# Patient Record
Sex: FEMALE | Race: BLACK OR AFRICAN AMERICAN | NOT HISPANIC OR LATINO | Employment: FULL TIME | URBAN - METROPOLITAN AREA
[De-identification: names, ages, dates, MRNs, and addresses within clinical notes are randomized per-mention and may not be internally consistent; named-entity substitution may affect disease eponyms.]

---

## 2023-03-21 DIAGNOSIS — E87.6 HYPOKALEMIA: ICD-10-CM

## 2023-03-21 RX ORDER — POTASSIUM CHLORIDE 1500 MG/1
20 TABLET, EXTENDED RELEASE ORAL DAILY
Qty: 90 TABLET | Refills: 0 | Status: SHIPPED | OUTPATIENT
Start: 2023-03-21 | End: 2023-06-07

## 2023-03-21 RX ORDER — POTASSIUM CHLORIDE 1500 MG/1
20 TABLET, EXTENDED RELEASE ORAL DAILY
COMMUNITY
End: 2023-03-21 | Stop reason: SDUPTHER

## 2023-04-24 ENCOUNTER — TELEPHONE (OUTPATIENT)
Dept: PRIMARY CARE | Facility: CLINIC | Age: 63
End: 2023-04-24
Payer: COMMERCIAL

## 2023-06-06 DIAGNOSIS — K21.9 GASTROESOPHAGEAL REFLUX DISEASE, UNSPECIFIED WHETHER ESOPHAGITIS PRESENT: Primary | ICD-10-CM

## 2023-06-06 DIAGNOSIS — E87.6 HYPOKALEMIA: ICD-10-CM

## 2023-06-06 RX ORDER — OMEPRAZOLE 40 MG/1
40 CAPSULE, DELAYED RELEASE ORAL
COMMUNITY
End: 2023-06-06 | Stop reason: SDUPTHER

## 2023-06-07 RX ORDER — POTASSIUM CHLORIDE 1500 MG/1
20 TABLET, EXTENDED RELEASE ORAL DAILY
Qty: 30 TABLET | Refills: 0 | Status: SHIPPED | OUTPATIENT
Start: 2023-06-07 | End: 2023-07-12 | Stop reason: SDUPTHER

## 2023-06-07 RX ORDER — OMEPRAZOLE 40 MG/1
40 CAPSULE, DELAYED RELEASE ORAL
Qty: 30 CAPSULE | Refills: 0 | Status: SHIPPED | OUTPATIENT
Start: 2023-06-07 | End: 2023-07-03 | Stop reason: SDUPTHER

## 2023-06-29 DIAGNOSIS — K21.9 GASTROESOPHAGEAL REFLUX DISEASE, UNSPECIFIED WHETHER ESOPHAGITIS PRESENT: ICD-10-CM

## 2023-07-03 DIAGNOSIS — K21.9 GASTROESOPHAGEAL REFLUX DISEASE, UNSPECIFIED WHETHER ESOPHAGITIS PRESENT: ICD-10-CM

## 2023-07-03 RX ORDER — OMEPRAZOLE 40 MG/1
40 CAPSULE, DELAYED RELEASE ORAL
Qty: 30 CAPSULE | Refills: 0 | Status: SHIPPED | OUTPATIENT
Start: 2023-07-03 | End: 2023-07-12 | Stop reason: SDUPTHER

## 2023-07-09 RX ORDER — OMEPRAZOLE 40 MG/1
40 CAPSULE, DELAYED RELEASE ORAL
Qty: 30 CAPSULE | Refills: 0 | OUTPATIENT
Start: 2023-07-09

## 2023-07-12 ENCOUNTER — OFFICE VISIT (OUTPATIENT)
Dept: PRIMARY CARE | Facility: CLINIC | Age: 63
End: 2023-07-12
Payer: COMMERCIAL

## 2023-07-12 VITALS
WEIGHT: 146.4 LBS | DIASTOLIC BLOOD PRESSURE: 74 MMHG | HEIGHT: 64 IN | OXYGEN SATURATION: 99 % | BODY MASS INDEX: 25 KG/M2 | SYSTOLIC BLOOD PRESSURE: 121 MMHG | HEART RATE: 89 BPM

## 2023-07-12 DIAGNOSIS — Z12.31 VISIT FOR SCREENING MAMMOGRAM: ICD-10-CM

## 2023-07-12 DIAGNOSIS — F33.1 MODERATE EPISODE OF RECURRENT MAJOR DEPRESSIVE DISORDER (MULTI): ICD-10-CM

## 2023-07-12 DIAGNOSIS — D57.40: ICD-10-CM

## 2023-07-12 DIAGNOSIS — K21.9 GASTROESOPHAGEAL REFLUX DISEASE, UNSPECIFIED WHETHER ESOPHAGITIS PRESENT: ICD-10-CM

## 2023-07-12 DIAGNOSIS — Z00.00 ROUTINE GENERAL MEDICAL EXAMINATION AT A HEALTH CARE FACILITY: Primary | ICD-10-CM

## 2023-07-12 DIAGNOSIS — I10 PRIMARY HYPERTENSION: ICD-10-CM

## 2023-07-12 DIAGNOSIS — E87.6 HYPOKALEMIA: ICD-10-CM

## 2023-07-12 PROCEDURE — 99214 OFFICE O/P EST MOD 30 MIN: CPT | Performed by: STUDENT IN AN ORGANIZED HEALTH CARE EDUCATION/TRAINING PROGRAM

## 2023-07-12 PROCEDURE — 4004F PT TOBACCO SCREEN RCVD TLK: CPT | Performed by: STUDENT IN AN ORGANIZED HEALTH CARE EDUCATION/TRAINING PROGRAM

## 2023-07-12 PROCEDURE — 99396 PREV VISIT EST AGE 40-64: CPT | Performed by: STUDENT IN AN ORGANIZED HEALTH CARE EDUCATION/TRAINING PROGRAM

## 2023-07-12 PROCEDURE — 3078F DIAST BP <80 MM HG: CPT | Performed by: STUDENT IN AN ORGANIZED HEALTH CARE EDUCATION/TRAINING PROGRAM

## 2023-07-12 PROCEDURE — 3074F SYST BP LT 130 MM HG: CPT | Performed by: STUDENT IN AN ORGANIZED HEALTH CARE EDUCATION/TRAINING PROGRAM

## 2023-07-12 RX ORDER — TRIAMTERENE AND HYDROCHLOROTHIAZIDE 75; 50 MG/1; MG/1
1 TABLET ORAL DAILY
Qty: 90 TABLET | Refills: 3 | Status: SHIPPED | OUTPATIENT
Start: 2023-07-12

## 2023-07-12 RX ORDER — OFLOXACIN 3 MG/ML
SOLUTION/ DROPS OPHTHALMIC 4 TIMES DAILY
COMMUNITY
Start: 2022-04-28 | End: 2024-06-04 | Stop reason: WASHOUT

## 2023-07-12 RX ORDER — DOXYCYCLINE 100 MG/1
100 CAPSULE ORAL
COMMUNITY
Start: 2023-05-29

## 2023-07-12 RX ORDER — TRIAMTERENE AND HYDROCHLOROTHIAZIDE 75; 50 MG/1; MG/1
1 TABLET ORAL DAILY
COMMUNITY
End: 2023-07-12 | Stop reason: SDUPTHER

## 2023-07-12 RX ORDER — OMEPRAZOLE 40 MG/1
40 CAPSULE, DELAYED RELEASE ORAL
Qty: 90 CAPSULE | Refills: 3 | Status: SHIPPED | OUTPATIENT
Start: 2023-07-12

## 2023-07-12 RX ORDER — CYCLOPENTOLATE HYDROCHLORIDE 10 MG/ML
SOLUTION/ DROPS OPHTHALMIC 2 TIMES DAILY
COMMUNITY
Start: 2022-04-28 | End: 2024-06-04 | Stop reason: WASHOUT

## 2023-07-12 RX ORDER — MULTIVITAMIN
1 TABLET ORAL DAILY
COMMUNITY
Start: 2012-08-21 | End: 2024-06-04 | Stop reason: WASHOUT

## 2023-07-12 RX ORDER — PLANT STANOL ESTER 450 MG
8000 TABLET ORAL DAILY
COMMUNITY

## 2023-07-12 RX ORDER — CETIRIZINE HYDROCHLORIDE 10 MG/1
10 TABLET ORAL DAILY
COMMUNITY
End: 2024-06-04 | Stop reason: WASHOUT

## 2023-07-12 RX ORDER — BUPROPION HYDROCHLORIDE 300 MG/1
300 TABLET ORAL DAILY
COMMUNITY
End: 2023-07-12 | Stop reason: SDUPTHER

## 2023-07-12 RX ORDER — SYRINGE-NEEDLE,INSULIN,0.5 ML 28GX1/2"
1 SYRINGE, EMPTY DISPOSABLE MISCELLANEOUS 2 TIMES DAILY
COMMUNITY
Start: 2022-08-19 | End: 2024-06-04 | Stop reason: WASHOUT

## 2023-07-12 RX ORDER — FOLIC ACID 1 MG/1
1 TABLET ORAL DAILY
COMMUNITY
Start: 2012-08-21

## 2023-07-12 RX ORDER — ASCORBIC ACID 125 MG
1 TABLET,CHEWABLE ORAL DAILY
COMMUNITY
Start: 2019-12-10 | End: 2024-06-04 | Stop reason: WASHOUT

## 2023-07-12 RX ORDER — POTASSIUM CHLORIDE 1500 MG/1
TABLET, EXTENDED RELEASE ORAL
Qty: 180 TABLET | Refills: 3 | Status: SHIPPED | OUTPATIENT
Start: 2023-07-12 | End: 2024-06-06 | Stop reason: SDUPTHER

## 2023-07-12 RX ORDER — OXYCODONE HYDROCHLORIDE 5 MG/1
5 TABLET ORAL
COMMUNITY
Start: 2023-06-09 | End: 2023-12-18 | Stop reason: SDUPTHER

## 2023-07-12 RX ORDER — BUPROPION HYDROCHLORIDE 300 MG/1
300 TABLET ORAL DAILY
Qty: 90 TABLET | Refills: 3 | Status: SHIPPED | OUTPATIENT
Start: 2023-07-12

## 2023-07-12 NOTE — PROGRESS NOTES
"  Subjective     Patient ID: Sonja Dallas is a 62 y.o. female who presents for Annual Exam (CPE.).    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     Dental visits : Y  Vision issues :Y  Hearing issues : N    Immunizations : Y    Diet :  could be better  Exercise:  Weight concerns :     Alcohol: as noted in SH  Tobacco: as noted in SH  Recreational drug use : None/ as noted in SH    Sexually active :  inactive   Contraception :   Menstrual problems:  Premenopausal/perimenopausal/ postmenopausal:    G:  Parity:  Full term:    Premature:   (s):   Living :  Ab induced:   Ab spontaneous :  Ectopic :   Multiple :    PAP smear :  Mammogram :  Colonoscopy:    Metabolic screening   - Lipid   - Glucose  ======================================================    Visit Vitals  /74   Pulse 89   Ht 1.626 m (5' 4\")   Wt 66.4 kg (146 lb 6.4 oz)   SpO2 99%   BMI 25.13 kg/m²   Smoking Status Some Days   BSA 1.73 m²      No LMP recorded.     =====================================    Review of systems:  Constitutional: no chills, no fever and no night sweats.     Eyes: no blurred vision and no eyesight problems.     ENT: no hearing loss, no nasal congestion, no nasal discharge, no hoarseness and no sore throat.     Cardiovascular: no chest pain, no intermittent leg claudication, no lower extremity edema, no palpitations and no syncope.     Respiratory: no cough, no shortness of breath during exertion, no shortness of breath at rest and no wheezing.     Gastrointestinal: no abdominal pain, no blood in stools, no constipation, no diarrhea, no melena, no nausea, no rectal pain " and no vomiting.     Genitourinary: no dysuria, no change in urinary frequency, no urinary hesitancy, no feelings of urinary urgency and no vaginal discharge.     Musculoskeletal: no arthralgias, no back pain and no myalgias.     Integumentary: no new skin lesions and no rashes.     Neurological: no difficulty walking, no headache, no limb weakness, no numbness and no tingling.     Psychiatric: no anxiety, no depression, no anhedonia and no substance use disorders.   ============================================================    Physical exam :    Constitutional: Alert and in no acute distress. Well developed, well nourished.     Eyes: Normal external exam. Pupils were equal in size, round, reactive to light (PERRL) with normal accommodation and extraocular movements intact (EOMI).     Ears, Nose, Mouth, and Throat: External inspection of ears and nose: Normal.  Otoscopic examination: Normal.      Neck: No neck mass was observed. Supple.     Cardiovascular: Heart rate and rhythm were normal, normal S1 and S2, no gallops, no murmurs and no pericardial rub    Pulmonary: No respiratory distress. Clear bilateral breath sounds.     Abdomen: Soft nontender; no abdominal mass palpated. No organomegaly.     Musculoskeletal: No joint swelling seen, normal movements of all extremities. Range of motion: Normal.  Muscle strength/tone: Normal.          Neurologic: Deep tendon reflexes were 2+ and symmetric. Sensation: Normal.     Psychiatric: Judgment and insight: Intact. Mood and affect: Normal.        Assessment/Plan    Diagnoses and all orders for this visit:  Routine general medical examination at a health care facility  Primary hypertension  -     Lipid Panel; Future  -     Hemoglobin A1C; Future  -     TSH with reflex to Free T4 if abnormal; Future  -     triamterene-hydrochlorothiazid (Maxzide) 75-50 mg tablet; Take 1 tablet by mouth once daily.  Gastroesophageal reflux disease, unspecified whether esophagitis present  -      omeprazole (PriLOSEC) 40 mg DR capsule; Take 1 capsule (40 mg) by mouth once daily in the morning. Take before meals.  Hypokalemia  -     Basic Metabolic Panel; Future  -     potassium chloride CR (K-Tab) 20 mEq ER tablet; Take 2 tablets every day  Sickle cell-thalassemia disease (CMS/HCC)  -     CBC; Future  Visit for screening mammogram  -     BI mammo bilateral screening tomosynthesis; Future  Moderate episode of recurrent major depressive disorder (CMS/HCC)  -     buPROPion XL (Wellbutrin XL) 300 mg 24 hr tablet; Take 1 tablet (300 mg) by mouth once daily.         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 ), h/o non small cell lung cancer s/p left upper lobe lobectomy , former smoker , HTN, depression ( Stable on Bupropion , worse during the process of divorce ), insomnia ( uses medical marijuana )        # HTN : controlled      # Mild HPL : Rpt Lipid panel      # hypokalemia : continue the use of Potassium supplements      HM :   Tdap :UTD  Flu : N/A  Shingles :  UTD   Mammogram :  ordered  Colonoscopy: current , due Sept 2026  PAP : current 3/2022 , due 2025, est with GYN     General preventive care discussed which includes regular exercise, healthy eating habits, staying active weight that is appropriate for his height, avoidance of smoking, excess alcohol consumption, avoidance of recreational drugs, safe and responsible sexual relationships and practices.       Vit D supplements recommended   Regular exercise recommended   Healthy eating choices discussed and recommended   BMI ( Body mass index ) discussed and encouraged weight loss through the above discussed lifestyle modifications .     Appropriate labs ordered            RTO in a year or sooner if needed

## 2023-07-13 ENCOUNTER — LAB (OUTPATIENT)
Dept: LAB | Facility: LAB | Age: 63
End: 2023-07-13
Payer: COMMERCIAL

## 2023-07-13 DIAGNOSIS — E87.6 HYPOKALEMIA: ICD-10-CM

## 2023-07-13 DIAGNOSIS — I10 PRIMARY HYPERTENSION: ICD-10-CM

## 2023-07-13 DIAGNOSIS — D57.40: ICD-10-CM

## 2023-07-13 LAB
ANION GAP IN SER/PLAS: 12 MMOL/L (ref 10–20)
CALCIUM (MG/DL) IN SER/PLAS: 10 MG/DL (ref 8.6–10.6)
CARBON DIOXIDE, TOTAL (MMOL/L) IN SER/PLAS: 29 MMOL/L (ref 21–32)
CHLORIDE (MMOL/L) IN SER/PLAS: 102 MMOL/L (ref 98–107)
CHOLESTEROL (MG/DL) IN SER/PLAS: 175 MG/DL (ref 0–199)
CHOLESTEROL IN HDL (MG/DL) IN SER/PLAS: 56.8 MG/DL
CHOLESTEROL/HDL RATIO: 3.1
CREATININE (MG/DL) IN SER/PLAS: 1.18 MG/DL (ref 0.5–1.05)
ERYTHROCYTE DISTRIBUTION WIDTH (RATIO) BY AUTOMATED COUNT: 15 % (ref 11.5–14.5)
ERYTHROCYTE MEAN CORPUSCULAR HEMOGLOBIN CONCENTRATION (G/DL) BY AUTOMATED: 33.4 G/DL (ref 32–36)
ERYTHROCYTE MEAN CORPUSCULAR VOLUME (FL) BY AUTOMATED COUNT: 81 FL (ref 80–100)
ERYTHROCYTES (10*6/UL) IN BLOOD BY AUTOMATED COUNT: 3.89 X10E12/L (ref 4–5.2)
GFR FEMALE: 53 ML/MIN/1.73M2
GLUCOSE (MG/DL) IN SER/PLAS: 92 MG/DL (ref 74–99)
HEMATOCRIT (%) IN BLOOD BY AUTOMATED COUNT: 31.7 % (ref 36–46)
HEMOGLOBIN (G/DL) IN BLOOD: 10.6 G/DL (ref 12–16)
LDL: 97 MG/DL (ref 0–99)
LEUKOCYTES (10*3/UL) IN BLOOD BY AUTOMATED COUNT: 4.9 X10E9/L (ref 4.4–11.3)
NRBC (PER 100 WBCS) BY AUTOMATED COUNT: 0 /100 WBC (ref 0–0)
PLATELETS (10*3/UL) IN BLOOD AUTOMATED COUNT: 229 X10E9/L (ref 150–450)
POTASSIUM (MMOL/L) IN SER/PLAS: 3.6 MMOL/L (ref 3.5–5.3)
SODIUM (MMOL/L) IN SER/PLAS: 139 MMOL/L (ref 136–145)
THYROTROPIN (MIU/L) IN SER/PLAS BY DETECTION LIMIT <= 0.05 MIU/L: 0.56 MIU/L (ref 0.44–3.98)
TRIGLYCERIDE (MG/DL) IN SER/PLAS: 108 MG/DL (ref 0–149)
UREA NITROGEN (MG/DL) IN SER/PLAS: 10 MG/DL (ref 6–23)
VLDL: 22 MG/DL (ref 0–40)

## 2023-07-13 PROCEDURE — 36415 COLL VENOUS BLD VENIPUNCTURE: CPT

## 2023-07-13 PROCEDURE — 84443 ASSAY THYROID STIM HORMONE: CPT

## 2023-07-13 PROCEDURE — 80061 LIPID PANEL: CPT

## 2023-07-13 PROCEDURE — 85027 COMPLETE CBC AUTOMATED: CPT

## 2023-07-13 PROCEDURE — 80048 BASIC METABOLIC PNL TOTAL CA: CPT

## 2023-07-13 PROCEDURE — 83036 HEMOGLOBIN GLYCOSYLATED A1C: CPT

## 2023-07-18 ENCOUNTER — TELEPHONE (OUTPATIENT)
Dept: PRIMARY CARE | Facility: CLINIC | Age: 63
End: 2023-07-18
Payer: COMMERCIAL

## 2023-07-18 LAB
ESTIMATED AVERAGE GLUCOSE FOR HBA1C: 82 MG/DL
HEMOGLOBIN A1C/HEMOGLOBIN TOTAL IN BLOOD: 4.5 %

## 2023-07-18 NOTE — TELEPHONE ENCOUNTER
----- Message from Bharti Romero MD sent at 7/18/2023  3:58 PM EDT -----  Normal screening mammogram

## 2023-07-20 ENCOUNTER — TELEPHONE (OUTPATIENT)
Dept: PRIMARY CARE | Facility: CLINIC | Age: 63
End: 2023-07-20
Payer: COMMERCIAL

## 2023-07-20 DIAGNOSIS — I10 PRIMARY HYPERTENSION: Primary | ICD-10-CM

## 2023-07-20 NOTE — TELEPHONE ENCOUNTER
----- Message from Bharti Romero MD sent at 7/20/2023  1:07 PM EDT -----  Mildly decreased kidney function , advise her to increase water intake   Advise to be seen in 6m  for fu on this . Rpt labs to be done prior to the visit   Anemia is at baseline    Normal cholesterol levels    Normal thyroid function test

## 2023-07-20 NOTE — TELEPHONE ENCOUNTER
----- Message from Bharti Romero MD sent at 7/18/2023  3:58 PM EDT -----  Normal screening mammogram   
Patient notified.   
Oriented - self; Oriented - place; Oriented - time

## 2023-08-09 ENCOUNTER — OFFICE VISIT (OUTPATIENT)
Dept: PRIMARY CARE | Facility: CLINIC | Age: 63
End: 2023-08-09
Payer: COMMERCIAL

## 2023-08-09 ENCOUNTER — LAB (OUTPATIENT)
Dept: LAB | Facility: LAB | Age: 63
End: 2023-08-09
Payer: COMMERCIAL

## 2023-08-09 VITALS
BODY MASS INDEX: 25.37 KG/M2 | DIASTOLIC BLOOD PRESSURE: 84 MMHG | HEART RATE: 107 BPM | WEIGHT: 148.6 LBS | OXYGEN SATURATION: 99 % | HEIGHT: 64 IN | SYSTOLIC BLOOD PRESSURE: 134 MMHG

## 2023-08-09 DIAGNOSIS — I10 PRIMARY HYPERTENSION: ICD-10-CM

## 2023-08-09 DIAGNOSIS — Z11.3 ROUTINE SCREENING FOR STI (SEXUALLY TRANSMITTED INFECTION): ICD-10-CM

## 2023-08-09 DIAGNOSIS — Z11.3 ROUTINE SCREENING FOR STI (SEXUALLY TRANSMITTED INFECTION): Primary | ICD-10-CM

## 2023-08-09 LAB
ANION GAP IN SER/PLAS: 13 MMOL/L (ref 10–20)
CALCIUM (MG/DL) IN SER/PLAS: 10.2 MG/DL (ref 8.6–10.6)
CARBON DIOXIDE, TOTAL (MMOL/L) IN SER/PLAS: 28 MMOL/L (ref 21–32)
CHLORIDE (MMOL/L) IN SER/PLAS: 106 MMOL/L (ref 98–107)
CREATININE (MG/DL) IN SER/PLAS: 1.21 MG/DL (ref 0.5–1.05)
GFR FEMALE: 51 ML/MIN/1.73M2
GLUCOSE (MG/DL) IN SER/PLAS: 105 MG/DL (ref 74–99)
HEPATITIS B VIRUS SURFACE AG PRESENCE IN SERUM: NONREACTIVE
HEPATITIS C VIRUS AB PRESENCE IN SERUM: NONREACTIVE
HIV 1/ 2 AG/AB SCREEN: NONREACTIVE
POTASSIUM (MMOL/L) IN SER/PLAS: 3.4 MMOL/L (ref 3.5–5.3)
SODIUM (MMOL/L) IN SER/PLAS: 144 MMOL/L (ref 136–145)
UREA NITROGEN (MG/DL) IN SER/PLAS: 11 MG/DL (ref 6–23)

## 2023-08-09 PROCEDURE — 36415 COLL VENOUS BLD VENIPUNCTURE: CPT

## 2023-08-09 PROCEDURE — 86694 HERPES SIMPLEX NES ANTBDY: CPT

## 2023-08-09 PROCEDURE — 86696 HERPES SIMPLEX TYPE 2 TEST: CPT

## 2023-08-09 PROCEDURE — 86803 HEPATITIS C AB TEST: CPT

## 2023-08-09 PROCEDURE — 80048 BASIC METABOLIC PNL TOTAL CA: CPT

## 2023-08-09 PROCEDURE — 87661 TRICHOMONAS VAGINALIS AMPLIF: CPT

## 2023-08-09 PROCEDURE — 86780 TREPONEMA PALLIDUM: CPT

## 2023-08-09 PROCEDURE — 87491 CHLMYD TRACH DNA AMP PROBE: CPT

## 2023-08-09 PROCEDURE — 87389 HIV-1 AG W/HIV-1&-2 AB AG IA: CPT

## 2023-08-09 PROCEDURE — 4004F PT TOBACCO SCREEN RCVD TLK: CPT | Performed by: STUDENT IN AN ORGANIZED HEALTH CARE EDUCATION/TRAINING PROGRAM

## 2023-08-09 PROCEDURE — 99214 OFFICE O/P EST MOD 30 MIN: CPT | Performed by: STUDENT IN AN ORGANIZED HEALTH CARE EDUCATION/TRAINING PROGRAM

## 2023-08-09 PROCEDURE — 86695 HERPES SIMPLEX TYPE 1 TEST: CPT

## 2023-08-09 PROCEDURE — 87591 N.GONORRHOEAE DNA AMP PROB: CPT

## 2023-08-09 PROCEDURE — 87340 HEPATITIS B SURFACE AG IA: CPT

## 2023-08-09 NOTE — PROGRESS NOTES
"Subjective   Patient ID: Sonja Dallas is a 63 y.o. female who presents for Follow-up (STD check. ).        HPI    Concern for STI   Last sexual activity with her former partner  a month ago   She denies any symptoms - vaginal discharge   H/o HSV In the past , no recent outbreaks     Visit Vitals  /84   Pulse 107   Ht 1.626 m (5' 4\")   Wt 67.4 kg (148 lb 9.6 oz)   SpO2 99%   BMI 25.51 kg/m²   Smoking Status Some Days   BSA 1.74 m²      No LMP recorded.     Review of Systems    Constitutional : No feeling poorly / fevers/ chills / night sweats/ fatigue   : As noted in HPI   skin : As noted in HPI   Psychiatric: No anxiety/ depression/ SI/HI    All other systems have been reviewed and are negative for complaint       Physical Exam    Constitutional : Vitals reviewed. Alert and in no distress  Cardiovascular : RRR, Normal S1, S2, No pericardial rub/ gallop, no peripheral edema   Pulmonary: No respiratory distress, CTAB     Neurologic : CNs 2-12 grossly intact , no obvious FNDs  Psych : A,Ox3, normal mood and affect      Assessment/Plan   Diagnoses and all orders for this visit:  Routine screening for STI (sexually transmitted infection)  -     HIV 1/2 Antigen/Antibody Screen with Reflex to Confirmation; Future  -     Syphilis Screen with Reflex; Future  -     HSV1 IgG and HSV2 IgG; Future  -     HSV Type I/II IgM Antibody; Future  -     Hepatitis B Surface Antigen; Future  -     Hepatitis C Antibody; Future  -     C. Trachomatis / N. Gonorrhoeae, Amplified Detection; Future  -     TRICH VAGINALIS, AMPLIFIED; Future      STI screen  Positive serology  for HSV discussed .   Further mgmt pending the results      "

## 2023-08-10 LAB
CHLAMYDIA TRACH., AMPLIFIED: NEGATIVE
HERPES SIMPLEX VIRUS 1 IGG: 1.9 INDEX
HERPES SIMPLEX VIRUS 2 IGG: >8 INDEX
N. GONORRHEA, AMPLIFIED: NEGATIVE
SYPHILIS TOTAL AB: NONREACTIVE
TRICHOMONAS VAGINALIS: NEGATIVE

## 2023-08-12 LAB — HERPES SIMPLEX VIRUS I/II ANTIBODY, IGM: 1.1 IV

## 2023-08-15 ENCOUNTER — TELEPHONE (OUTPATIENT)
Dept: PRIMARY CARE | Facility: CLINIC | Age: 63
End: 2023-08-15
Payer: COMMERCIAL

## 2023-08-17 ENCOUNTER — LAB (OUTPATIENT)
Dept: LAB | Facility: LAB | Age: 63
End: 2023-08-17
Payer: COMMERCIAL

## 2023-08-17 DIAGNOSIS — Z02.1 PHYSICAL EXAM, PRE-EMPLOYMENT: Primary | ICD-10-CM

## 2023-08-17 DIAGNOSIS — Z02.1 PHYSICAL EXAM, PRE-EMPLOYMENT: ICD-10-CM

## 2023-08-17 LAB
APPEARANCE, URINE: CLEAR
BILIRUBIN, URINE: NEGATIVE
BLOOD, URINE: NEGATIVE
COLOR, URINE: YELLOW
GLUCOSE, URINE: NEGATIVE MG/DL
KETONES, URINE: NEGATIVE MG/DL
LEUKOCYTE ESTERASE, URINE: NEGATIVE
NITRITE, URINE: NEGATIVE
PH, URINE: 7 (ref 5–8)
PROTEIN, URINE: NEGATIVE MG/DL
SPECIFIC GRAVITY, URINE: 1.01 (ref 1–1.03)
UROBILINOGEN, URINE: 4 MG/DL (ref 0–1.9)

## 2023-08-17 PROCEDURE — 36415 COLL VENOUS BLD VENIPUNCTURE: CPT

## 2023-08-17 PROCEDURE — 86481 TB AG RESPONSE T-CELL SUSP: CPT

## 2023-08-17 PROCEDURE — 81003 URINALYSIS AUTO W/O SCOPE: CPT

## 2023-08-20 LAB
NIL(NEG) CONTROL SPOT COUNT: NORMAL
PANEL A SPOT COUNT: 0
PANEL B SPOT COUNT: 0
POS CONTROL SPOT COUNT: NORMAL
T-SPOT. TB INTERPRETATION: NEGATIVE

## 2023-08-25 ENCOUNTER — TELEMEDICINE (OUTPATIENT)
Dept: PRIMARY CARE | Facility: CLINIC | Age: 63
End: 2023-08-25
Payer: COMMERCIAL

## 2023-08-25 ENCOUNTER — APPOINTMENT (OUTPATIENT)
Dept: PRIMARY CARE | Facility: CLINIC | Age: 63
End: 2023-08-25
Payer: COMMERCIAL

## 2023-08-25 DIAGNOSIS — J06.9 UPPER RESPIRATORY TRACT INFECTION, UNSPECIFIED TYPE: Primary | ICD-10-CM

## 2023-08-25 PROCEDURE — 87636 SARSCOV2 & INF A&B AMP PRB: CPT

## 2023-08-25 PROCEDURE — 99214 OFFICE O/P EST MOD 30 MIN: CPT | Performed by: STUDENT IN AN ORGANIZED HEALTH CARE EDUCATION/TRAINING PROGRAM

## 2023-08-25 NOTE — PROGRESS NOTES
Subjective   Patient ID: Sonja Dallas is a 63 y.o. female who presents for Follow-up (Sickness possible COVID ).        HPI    Televisit   Sx onset yesterday   HA and body aches   Loss of appetite   Nasal congestion   Was with her son , who was exposed  to covid at work     Visit Vitals  Smoking Status Some Days      No LMP recorded.     Review of Systems    As noted in HPI       Physical Exam    Limited physical due to the virtual nature of this visit ( real time audio- visual )  Constitutional : Alert, in no distress     Pulm : Normal work of breathing   CNS : Moves all extremities symmetrically   Psych:  A , O X 3 normal mood and effect, speaks coherently     Assessment/Plan   Diagnoses and all orders for this visit:  Upper respiratory tract infection, unspecified type  -     Sars-CoV-2 PCR, Symptomatic; Future  -     Influenza A, and B PCR; Future         Testing as above   Suggested ER visit given her sickle cell anemia nd crises with infections, pt declined   Tesitng to be done in the office , further mgmt pending the results

## 2023-08-26 LAB
FLU A RESULT: NOT DETECTED
FLU B RESULT: NOT DETECTED
SARS-COV-2 RESULT: NOT DETECTED

## 2023-11-13 ENCOUNTER — TELEMEDICINE (OUTPATIENT)
Dept: PRIMARY CARE | Facility: CLINIC | Age: 63
End: 2023-11-13
Payer: COMMERCIAL

## 2023-11-13 ENCOUNTER — CLINICAL SUPPORT (OUTPATIENT)
Dept: PRIMARY CARE | Facility: CLINIC | Age: 63
End: 2023-11-13
Payer: COMMERCIAL

## 2023-11-13 DIAGNOSIS — R68.89 FLU-LIKE SYMPTOMS: Primary | ICD-10-CM

## 2023-11-13 PROCEDURE — 87636 SARSCOV2 & INF A&B AMP PRB: CPT

## 2023-11-13 PROCEDURE — 99214 OFFICE O/P EST MOD 30 MIN: CPT | Performed by: STUDENT IN AN ORGANIZED HEALTH CARE EDUCATION/TRAINING PROGRAM

## 2023-11-13 NOTE — LETTER
November 14, 2023       No Recipients    Patient: Sonja Dallas   YOB: 1960   Date of Visit: 11/13/2023       Dear Dr. Jimenez Recipients:    Thank you for referring Sonja Dallas to me for evaluation. Below are my notes for this consultation.  If you have questions, please do not hesitate to call me. I look forward to following your patient along with you.       Sincerely,     Bharti Romero MD      CC:   No Recipients  ______________________________________________________________________________________    Subjective  Patient ID: Sonja Dallas is a 63 y.o. female who presents for Follow-up (Flu-like sxs. ).        HPI    Televisit   Sx onset on Friday  - chills , nasal discharge  Excessive lacrimation  HA+  SOB +  Cough +  Works with children     Visit Vitals  Smoking Status Some Days      No LMP recorded.     Review of Systems    As noted in HPI       Physical Exam    Limited physical due to the virtual nature of this visit ( real time audio- visual )  Constitutional : Alert, in no distress     Pulm : Normal work of breathing   CNS : Moves all extremities symmetrically   Psych:  A , O X 3 normal mood and effect, speaks coherently     Assessment/Plan  Diagnoses and all orders for this visit:  Flu-like symptoms  -     Sars-CoV-2 and Influenza A/B PCR; Future       Flu vs covid vs bacterial infection ( CAP ) vs acute bronchitis   Testing ordered as above   Pt has sickle cell anemia , discussed risk of sickle cell crises and sx to watch for and the  need to go to the ER for mgmt     Conditions addressed and mgmt as noted above.  Pertinent labs, images/ imaging reports , chart review was done .   Age appropriate labs / labs for mgmt of chronic medical conditions ordered, further mgmt pending the results.

## 2023-11-13 NOTE — PROGRESS NOTES
Subjective   Patient ID: Sonja Dallas is a 63 y.o. female who presents for Follow-up (Flu-like sxs. ).        HPI    Televisit   Sx onset on Friday  - chills , nasal discharge  Excessive lacrimation  HA+  SOB +  Cough +  Works with children     Visit Vitals  Smoking Status Some Days      No LMP recorded.     Review of Systems    As noted in HPI       Physical Exam    Limited physical due to the virtual nature of this visit ( real time audio- visual )  Constitutional : Alert, in no distress     Pulm : Normal work of breathing   CNS : Moves all extremities symmetrically   Psych:  A , O X 3 normal mood and effect, speaks coherently     Assessment/Plan   Diagnoses and all orders for this visit:  Flu-like symptoms  -     Sars-CoV-2 and Influenza A/B PCR; Future       Flu vs covid vs bacterial infection ( CAP ) vs acute bronchitis   Testing ordered as above   Pt has sickle cell anemia , discussed risk of sickle cell crises and sx to watch for and the  need to go to the ER for mgmt     Conditions addressed and mgmt as noted above.  Pertinent labs, images/ imaging reports , chart review was done .   Age appropriate labs / labs for mgmt of chronic medical conditions ordered, further mgmt pending the results.

## 2023-11-14 DIAGNOSIS — J06.9 UPPER RESPIRATORY TRACT INFECTION, UNSPECIFIED TYPE: Primary | ICD-10-CM

## 2023-11-14 LAB
FLUAV RNA RESP QL NAA+PROBE: NOT DETECTED
FLUBV RNA RESP QL NAA+PROBE: NOT DETECTED
SARS-COV-2 RNA RESP QL NAA+PROBE: NOT DETECTED

## 2023-11-14 RX ORDER — AZITHROMYCIN 500 MG/1
500 TABLET, FILM COATED ORAL DAILY
Qty: 5 TABLET | Refills: 0 | Status: SHIPPED | OUTPATIENT
Start: 2023-11-14 | End: 2023-11-19

## 2023-11-14 NOTE — PROGRESS NOTES
Spoke to pt at 4: 15 pm   COVID and flu negative   Today is day 5 of sx with no improvement   Most bothersome sx : HA, chills and cough Rx with azithromycin   If sx worsening, advised to go to the ER

## 2023-11-29 ENCOUNTER — OFFICE VISIT (OUTPATIENT)
Dept: PRIMARY CARE | Facility: CLINIC | Age: 63
End: 2023-11-29
Payer: COMMERCIAL

## 2023-11-29 VITALS
SYSTOLIC BLOOD PRESSURE: 130 MMHG | WEIGHT: 155.4 LBS | HEIGHT: 64 IN | OXYGEN SATURATION: 94 % | HEART RATE: 100 BPM | DIASTOLIC BLOOD PRESSURE: 76 MMHG | BODY MASS INDEX: 26.53 KG/M2

## 2023-11-29 DIAGNOSIS — Z23 NEED FOR INFLUENZA VACCINATION: ICD-10-CM

## 2023-11-29 DIAGNOSIS — Z85.118: ICD-10-CM
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
## 2023-11-29 PROCEDURE — 90471 IMMUNIZATION ADMIN: CPT | Performed by: STUDENT IN AN ORGANIZED HEALTH CARE EDUCATION/TRAINING PROGRAM

## 2023-11-29 PROCEDURE — 4004F PT TOBACCO SCREEN RCVD TLK: CPT | Performed by: STUDENT IN AN ORGANIZED HEALTH CARE EDUCATION/TRAINING PROGRAM

## 2023-11-29 PROCEDURE — 90686 IIV4 VACC NO PRSV 0.5 ML IM: CPT | Performed by: STUDENT IN AN ORGANIZED HEALTH CARE EDUCATION/TRAINING PROGRAM

## 2023-11-29 PROCEDURE — 99214 OFFICE O/P EST MOD 30 MIN: CPT | Performed by: STUDENT IN AN ORGANIZED HEALTH CARE EDUCATION/TRAINING PROGRAM

## 2023-11-29 RX ORDER — CYCLOBENZAPRINE HCL 5 MG
5 TABLET ORAL 3 TIMES DAILY PRN
Qty: 30 TABLET | Refills: 0 | Status: SHIPPED | OUTPATIENT
Start: 2023-11-29 | End: 2024-06-04 | Stop reason: WASHOUT
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
## 2023-11-30 ENCOUNTER — ANCILLARY PROCEDURE (OUTPATIENT)
Dept: RADIOLOGY | Facility: CLINIC | Age: 63
End: 2023-11-30
Payer: COMMERCIAL
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
## 2023-11-30 PROCEDURE — 72100 X-RAY EXAM L-S SPINE 2/3 VWS: CPT | Performed by: RADIOLOGY

## 2023-11-30 PROCEDURE — 72040 X-RAY EXAM NECK SPINE 2-3 VW: CPT

## 2023-11-30 PROCEDURE — 72100 X-RAY EXAM L-S SPINE 2/3 VWS: CPT

## 2023-11-30 PROCEDURE — 72040 X-RAY EXAM NECK SPINE 2-3 VW: CPT | Performed by: RADIOLOGY

## 2023-12-18 ENCOUNTER — TELEMEDICINE (OUTPATIENT)
Dept: HEMATOLOGY/ONCOLOGY | Facility: HOSPITAL | Age: 63
End: 2023-12-18
Payer: COMMERCIAL

## 2023-12-18 DIAGNOSIS — D57.44 SICKLE CELL DISEASE, TYPE S BETA-PLUS THALASSEMIA (MULTI): Primary | ICD-10-CM

## 2023-12-18 DIAGNOSIS — I15.9 SECONDARY HYPERTENSION: ICD-10-CM

## 2023-12-18 PROCEDURE — 99214 OFFICE O/P EST MOD 30 MIN: CPT | Performed by: PEDIATRICS

## 2023-12-18 RX ORDER — OXYCODONE HYDROCHLORIDE 5 MG/1
5 TABLET ORAL EVERY 6 HOURS PRN
Qty: 60 TABLET | Refills: 0 | Status: SHIPPED | OUTPATIENT
Start: 2023-12-18 | End: 2024-01-18 | Stop reason: SDUPTHER

## 2023-12-18 RX ORDER — NALOXONE HYDROCHLORIDE 4 MG/.1ML
4 SPRAY NASAL AS NEEDED
Qty: 2 EACH | Refills: 0 | Status: SHIPPED | OUTPATIENT
Start: 2023-12-18 | End: 2024-12-17

## 2023-12-19 ASSESSMENT — ENCOUNTER SYMPTOMS
MYALGIAS: 1
LEG SWELLING: 0
BLOOD IN STOOL: 0
ARTHRALGIAS: 0
VOMITING: 0
FEVER: 0
PALPITATIONS: 0
CHILLS: 0
NAUSEA: 0
SHORTNESS OF BREATH: 0
DEPRESSION: 0
CHEST TIGHTNESS: 0
WHEEZING: 0
ABDOMINAL PAIN: 1
SORE THROAT: 0
DIZZINESS: 0
HEMATOLOGIC/LYMPHATIC NEGATIVE: 1
EYE PROBLEMS: 1
HEADACHES: 0
LIGHT-HEADEDNESS: 0
WOUND: 0
NERVOUS/ANXIOUS: 0
NECK STIFFNESS: 0
DIARRHEA: 0
FATIGUE: 1
COUGH: 0
EXTREMITY WEAKNESS: 0
CONSTIPATION: 0
NECK PAIN: 0

## 2023-12-19 NOTE — PROGRESS NOTES
SICKLE CELL OUTPATIENT NOTE (Telephone visit)  Patient ID: Sonja Dallas   Visit Type: Follow up visit       ASSESSMENT AND PLAN  Sonja Dallas is 63 y.o. 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 She is not on DMT. Her pain is currently well controlled with current pain regimen. No changes in her current pain regimen and she will continue to receive   Oral tylenol and or ibuprofen as needed for mild to moderate pain   Oral oxycodone 5 mg every 4-6 hours prn for moderate, severe and breakthrough pain. Sent a new prescription for 60 tabs   Flexeril as needed as muscle relaxant   Discussed non pharmacologic methods of pain control   Reviewed OARRS  Continue PT  Discussed importance of having naloxone readily available and a new prescription sent for patient   Follow up with ophthalmology for cataract surgery as scheduled     History of depression and anxiety currently well controlled with her medications    Continue Wellbutrin  mg daily and continue to follow up with her therapist      Chronic anemia secondary to SCD with most recent hemoglobin of 10.6 g/dl which is around her baseline    Daily folic acid   Repeat CBC at her next encounter     History of abdominal hernia per verbal report. She will follow up with her PCP/surgeon and will keep us informed of any planned surgery so we can make recommendations for perioperative management     Elevated Cr of 1.18 from 7/13/23   Renal panel and urine albumin at her next office visit encounter    History of essential hypertension    Continue current antihypertensives with Hydrochlorothiazide triamterene (Maxzide) 75-50 mg daily   Follow blood pressure at her next office visit     Follow up office visit will be in 4 weeks time.     Chief Complaint: Follow up for HB S beta + thal SCD     Interval History: Sonja Dallas requested for a phone visit  because she had to work. She is following up for her SCD. Her last admission to hospital or ED visit was more than 1 year ago. Acute pain has successfully been treated in the [facility] and was last seen there in may of this year. Her chronic and intermittent acute pain have been well controlled with her oral pain medication using oral oxycodone (60 tabs over 6 months) which is very conservative. Her pain is mainly in her hips, shoulders and legs. She has also had pain and weakness in her hips. Of note she is S/P bilateral hip replacement (right hip in 2012 and left hip in 2007) and shoulder in 2018. She denies worsening of her gait. She continues to receive PT. She has been diagnosed with cataract of the eyes and is following up with ophthalmology. She does not have any retinopathy. She has been diagnosed with an abdominal wall hernia and is scheduled to see the surgeon.    She denies chest pain  or chest tightness, cough, shortness of breath, or increased work of breathing.  She has not been using her home night time oxygen. Her mood and affect have been normal and continues to follow up with her therapist.     Review of System:   Review of Systems   Constitutional:  Positive for fatigue. Negative for chills and fever.   HENT:   Negative for nosebleeds and sore throat.    Eyes:  Positive for eye problems.   Respiratory:  Negative for chest tightness, cough, shortness of breath and wheezing.    Cardiovascular:  Negative for chest pain, leg swelling and palpitations.   Gastrointestinal:  Positive for abdominal pain. Negative for blood in stool, constipation, diarrhea, nausea and vomiting.   Genitourinary: Negative.     Musculoskeletal:  Positive for gait problem and myalgias. Negative for arthralgias, neck pain and neck stiffness.   Skin:  Negative for rash and wound.   Neurological:  Positive for gait problem. Negative for dizziness, extremity weakness, headaches and light-headedness.   Hematological: Negative.     Psychiatric/Behavioral:  Negative for depression. The patient is not nervous/anxious.         Allergies:   Allergies   Allergen Reactions    Gabapentin Other     Abdominal pain    Latex Swelling     gloves    Morphine Unknown     itching       Current Medications:   Outpatient Encounter Medications as of 12/18/2023   Medication Sig Dispense Refill    buPROPion XL (Wellbutrin XL) 300 mg 24 hr tablet Take 1 tablet (300 mg) by mouth once daily. 90 tablet 3    cetirizine (ZyrTEC) 10 mg tablet Take 1 tablet (10 mg) by mouth once daily.      cholecalciferol, vitamin D3, 25 mcg (1,000 unit) tablet,chewable Chew 1 tablet (25 mcg) once daily.      cyclobenzaprine (Flexeril) 5 mg tablet Take 1 tablet (5 mg) by mouth 3 times a day as needed for muscle spasms. 30 tablet 0    doxycycline (Monodox) 100 mg capsule 1 capsule (100 mg).      ferrous fumarate-vitamin C ([place] 65-25) Take 1 tablet by mouth 3 times a day with meals. Do not crush, chew, or split.      folic acid (Folvite) 1 mg tablet Take 1 tablet (1 mg) by mouth once daily.      multivitamin tablet Take 1 tablet by mouth once daily.      omeprazole (PriLOSEC) 40 mg DR capsule Take 1 capsule (40 mg) by mouth once daily in the morning. Take before meals. 90 capsule 3    potassium chloride CR (K-Tab) 20 mEq ER tablet Take 2 tablets every day 180 tablet 3    triamterene-hydrochlorothiazid (Maxzide) 75-50 mg tablet Take 1 tablet by mouth once daily. 90 tablet 3    vitamin A 2,400 mcg capsule Take 1 capsule (2.4 mg) by mouth once daily.      [DISCONTINUED] oxyCODONE (Roxicodone) 5 mg immediate release tablet 1 tablet (5 mg).      cyclopentolate (Cyclogyl) 1 % ophthalmic solution Administer into affected eye(s) twice a day.      Mucus Relief  mg 12 hr tablet Take 1 tablet (600 mg) by mouth 2 times a day.      naloxone (Narcan) 4 mg/0.1 mL nasal spray Administer 1 spray (4 mg) into affected nostril(s) if needed for opioid reversal. May repeat every 2-3 minutes  if needed, alternating nostrils, until medical assistance becomes available. 2 each 0    ofloxacin (Ocuflox) 0.3 % ophthalmic solution Administer into affected eye(s) 4 times a day.      oxyCODONE (Roxicodone) 5 mg immediate release tablet Take 1 tablet (5 mg) by mouth every 6 hours if needed for severe pain (7 - 10). 60 tablet 0     No facility-administered encounter medications on file as of 12/18/2023.       Past Medical History:    has a past medical history of Acute embolism and thrombosis of superficial veins of left upper extremity (03/23/2019), Adjustment disorder with mixed anxiety and depressed mood (06/18/2012), Encounter for immunization (12/21/2020), Encounter for immunization (10/11/2019), Hb-SS disease with crisis, unspecified (CMS/[facility]) (02/08/2018), Idiopathic aseptic necrosis of unspecified bone (CMS/[facility]) (05/06/2013), Opioid use, unspecified with withdrawal (CMS/[facility]) (05/06/2013), Other conditions influencing health status (06/18/2012), Other conditions influencing health status (12/04/2012), Personal history of diseases of the blood and blood-forming organs and certain disorders involving the immune mechanism, Personal history of diseases of the blood and blood-forming organs and certain disorders involving the immune mechanism (06/18/2012), Personal history of other diseases of the circulatory system, Personal history of other diseases of the musculoskeletal system and connective tissue, Personal history of other diseases of the respiratory system, Personal history of other endocrine, nutritional and metabolic disease, Personal history of other infectious and parasitic diseases (01/21/2014), Personal history of other malignant neoplasm of bronchus and lung (04/15/2018), Personal history of other medical treatment (10/11/2019), Personal history of other mental and behavioral disorders (04/14/2013), Personal history of other specified conditions, Personal history of other specified conditions,  Personal history of other specified conditions (04/04/2013), and Puckering of macula, left eye (06/18/2015).    Past Surgical History:    has a past surgical history that includes Total hip arthroplasty (06/18/2012); Other surgical history (06/18/2012); Total hip arthroplasty (12/04/2012); Other surgical history (09/21/2017); Other surgical history (04/20/2015); Gallbladder surgery (09/28/2015); Total shoulder arthroplasty (07/24/2018); and Lung lobectomy (08/04/2012).    Family History:   No family history on file.    Social History:   Sonja Dallas  reports that she has been smoking cigarettes. She has never used smokeless tobacco.  reports current drug use. Drug: Marijuana.    EXAMINATION FINDINGS   There were no vitals taken for this visit.  There is no height or weight on file to calculate BSA.    Physical Exam  Not done since this was a virtual visit   LABS   Clinical Support on 11/13/2023   Component Date Value Ref Range Status    Flu A Result 11/13/2023 Not Detected  Not Detected Final    Flu B Result 11/13/2023 Not Detected  Not Detected Final    Coronavirus 2019, PCR 11/13/2023 Not Detected  Not Detected Final               Oleg Pascal MD    This was a Phone visit per patients request. Consent obtained from patient, who was at work in [employer] also located in [facility]. Start time 1330 and end time of 13;50

## 2024-01-17 ENCOUNTER — APPOINTMENT (OUTPATIENT)
Dept: HEMATOLOGY/ONCOLOGY | Facility: HOSPITAL | Age: 64
End: 2024-01-17
Payer: COMMERCIAL

## 2024-01-17 ENCOUNTER — TELEPHONE (OUTPATIENT)
Dept: HEMATOLOGY/ONCOLOGY | Facility: HOSPITAL | Age: 64
End: 2024-01-17

## 2024-01-17 DIAGNOSIS — D57.44 SICKLE CELL DISEASE, TYPE S BETA-PLUS THALASSEMIA (MULTI): ICD-10-CM

## 2024-01-17 DIAGNOSIS — D57.00 SICKLE CELL DISEASE WITH CRISIS (MULTI): Primary | ICD-10-CM

## 2024-01-17 NOTE — TELEPHONE ENCOUNTER
Refill request received for Oxycodone 5mg.  Preferred pharmacy is [facility] at [address] in [place].  Message sent to [facility] team.

## 2024-01-18 RX ORDER — OXYCODONE HYDROCHLORIDE 5 MG/1
5 TABLET ORAL EVERY 6 HOURS PRN
Qty: 60 TABLET | Refills: 0 | Status: SHIPPED | OUTPATIENT
Start: 2024-01-18 | End: 2024-02-06 | Stop reason: SDUPTHER

## 2024-01-29 ENCOUNTER — OFFICE VISIT (OUTPATIENT)
Dept: HEMATOLOGY/ONCOLOGY | Facility: HOSPITAL | Age: 64
End: 2024-01-29
Payer: COMMERCIAL

## 2024-01-29 VITALS
RESPIRATION RATE: 18 BRPM | HEART RATE: 93 BPM | HEIGHT: 65 IN | BODY MASS INDEX: 25.22 KG/M2 | OXYGEN SATURATION: 100 % | SYSTOLIC BLOOD PRESSURE: 115 MMHG | WEIGHT: 151.4 LBS | DIASTOLIC BLOOD PRESSURE: 62 MMHG | TEMPERATURE: 97 F

## 2024-01-29 DIAGNOSIS — D57.00 SICKLE CELL DISEASE WITH CRISIS (MULTI): ICD-10-CM

## 2024-01-29 PROCEDURE — 99214 OFFICE O/P EST MOD 30 MIN: CPT | Performed by: INTERNAL MEDICINE

## 2024-01-29 ASSESSMENT — ENCOUNTER SYMPTOMS
CONSTIPATION: 0
DIARRHEA: 0
WOUND: 0
BLOOD IN STOOL: 0
HEMATOLOGIC/LYMPHATIC NEGATIVE: 1
SORE THROAT: 0
CHILLS: 0
PALPITATIONS: 0
COUGH: 0
CHEST TIGHTNESS: 0
LIGHT-HEADEDNESS: 0
NERVOUS/ANXIOUS: 0
NECK PAIN: 0
WHEEZING: 0
EYE PROBLEMS: 0
DIZZINESS: 0
LEG SWELLING: 0
HEADACHES: 0
SHORTNESS OF BREATH: 0
FATIGUE: 0
MYALGIAS: 1
VOMITING: 0
ARTHRALGIAS: 1
EXTREMITY WEAKNESS: 0
DEPRESSION: 0
NAUSEA: 0
FEVER: 0
ABDOMINAL PAIN: 1
NECK STIFFNESS: 0

## 2024-01-29 ASSESSMENT — PAIN SCALES - GENERAL: PAINLEVEL: 0-NO PAIN

## 2024-01-29 NOTE — PROGRESS NOTES
Patient ID: Sonja Dallas   Visit Type: Follow up visit       - labs pending  - ref to gen surg- abd hernia   - fuv in 3 months     ASSESSMENT AND PLAN  Sonja Dallas is 63 y.o. 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 She is not on DMT. Her pain is currently well controlled with current pain regimen. No changes in her current pain regimen and she will continue to receive   Oral tylenol and or ibuprofen as needed for mild to moderate pain   Oral oxycodone 5 mg every 4-6 hours prn for moderate, severe and breakthrough pain. Sent a new prescription for 60 tabs   Flexeril as needed as muscle relaxant   Discussed non pharmacologic methods of pain control   Reviewed OARRS  Continue PT  Discussed importance of having naloxone readily available and a new prescription sent for patient   Follow up with ophthalmology for cataract surgery as scheduled     History of depression and anxiety currently well controlled with her medications    Continue Wellbutrin  mg daily and continue to follow up with her therapist      Chronic anemia secondary to SCD with most recent hemoglobin of 10.6 g/dl which is around her baseline    Daily folic acid   Repeat CBC at her next encounter     History of abdominal hernia per verbal report. She will follow up with her PCP/surgeon and will keep us informed of any planned surgery so we can make recommendations for perioperative management referred to general surg.     Elevated Cr of 1.18 from 7/13/23   Renal panel and urine albumin at her next office visit encounter    History of essential hypertension    Continue current antihypertensives with Hydrochlorothiazide triamterene (Maxzide) 75-50 mg daily   Follow blood pressure at her next office visit     Follow up office visit will be in 4 weeks time.     Chief Complaint: Follow up for HB S beta + thal SCD     Interval  History: Sonja Dallas presents for fuv. She has pain today, all over, and left hip AVN. Has been to orthopedics and waiting to be a surgical candidate.  Waiting to return to PT, just started a new job. Hoping to start a job a .  She is looking forward to starting her new job and returning to school. Has not been in [facility] or ED since her last visit to clinic. She has an abd hernia for the past 25 yrs, it hurts some with movement and she is able to stop the pain with breathing and staying still. She would to have the hernia repaired.         Review of System:   Review of Systems   Constitutional:  Negative for chills, fatigue and fever.   HENT:   Negative for nosebleeds and sore throat.    Eyes:  Negative for eye problems.   Respiratory:  Negative for chest tightness, cough, shortness of breath and wheezing.    Cardiovascular:  Negative for chest pain, leg swelling and palpitations.   Gastrointestinal:  Positive for abdominal pain. Negative for blood in stool, constipation, diarrhea, nausea and vomiting.   Genitourinary: Negative.     Musculoskeletal:  Positive for arthralgias, gait problem and myalgias. Negative for neck pain and neck stiffness.   Skin:  Negative for rash and wound.   Neurological:  Positive for gait problem. Negative for dizziness, extremity weakness, headaches and light-headedness.   Hematological: Negative.    Psychiatric/Behavioral:  Negative for depression. The patient is not nervous/anxious.         Allergies:   Allergies   Allergen Reactions    Gabapentin Other     Abdominal pain    Latex Swelling     gloves    Morphine Unknown     itching       Current Medications:   Outpatient Encounter Medications as of 1/29/2024   Medication Sig Dispense Refill    buPROPion XL (Wellbutrin XL) 300 mg 24 hr tablet Take 1 tablet (300 mg) by mouth once daily. 90 tablet 3    cetirizine (ZyrTEC) 10 mg tablet Take 1 tablet (10 mg) by mouth once daily.      cholecalciferol, vitamin D3,  25 mcg (1,000 unit) tablet,chewable Chew 1 tablet (25 mcg) once daily.      cyclobenzaprine (Flexeril) 5 mg tablet Take 1 tablet (5 mg) by mouth 3 times a day as needed for muscle spasms. 30 tablet 0    doxycycline (Monodox) 100 mg capsule 1 capsule (100 mg).      folic acid (Folvite) 1 mg tablet Take 1 tablet (1 mg) by mouth once daily.      multivitamin tablet Take 1 tablet by mouth once daily.      omeprazole (PriLOSEC) 40 mg DR capsule Take 1 capsule (40 mg) by mouth once daily in the morning. Take before meals. 90 capsule 3    oxyCODONE (Roxicodone) 5 mg immediate release tablet Take 1 tablet (5 mg) by mouth every 6 hours if needed for severe pain (7 - 10). 60 tablet 0    triamterene-hydrochlorothiazid (Maxzide) 75-50 mg tablet Take 1 tablet by mouth once daily. 90 tablet 3    vitamin A 2,400 mcg capsule Take 1 capsule (2.4 mg) by mouth once daily.      cyclopentolate (Cyclogyl) 1 % ophthalmic solution Administer into affected eye(s) twice a day.      ferrous fumarate-vitamin C ([place] 65-25) Take 1 tablet by mouth 3 times a day with meals. Do not crush, chew, or split.      Mucus Relief  mg 12 hr tablet Take 1 tablet (600 mg) by mouth 2 times a day.      naloxone (Narcan) 4 mg/0.1 mL nasal spray Administer 1 spray (4 mg) into affected nostril(s) if needed for opioid reversal. May repeat every 2-3 minutes if needed, alternating nostrils, until medical assistance becomes available. (Patient not taking: Reported on 1/29/2024) 2 each 0    ofloxacin (Ocuflox) 0.3 % ophthalmic solution Administer into affected eye(s) 4 times a day.      potassium chloride CR (K-Tab) 20 mEq ER tablet Take 2 tablets every day (Patient not taking: Reported on 1/29/2024) 180 tablet 3     No facility-administered encounter medications on file as of 1/29/2024.       Past Medical History:    has a past medical history of Acute embolism and thrombosis of superficial veins of left upper extremity (03/23/2019), Adjustment  disorder with mixed anxiety and depressed mood (06/18/2012), Encounter for immunization (12/21/2020), Encounter for immunization (10/11/2019), Hb-SS disease with crisis, unspecified (CMS/[facility]) (02/08/2018), Idiopathic aseptic necrosis of unspecified bone (CMS/[facility]) (05/06/2013), Opioid use, unspecified with withdrawal (CMS/[facility]) (05/06/2013), Other conditions influencing health status (06/18/2012), Other conditions influencing health status (12/04/2012), Personal history of diseases of the blood and blood-forming organs and certain disorders involving the immune mechanism, Personal history of diseases of the blood and blood-forming organs and certain disorders involving the immune mechanism (06/18/2012), Personal history of other diseases of the circulatory system, Personal history of other diseases of the musculoskeletal system and connective tissue, Personal history of other diseases of the respiratory system, Personal history of other endocrine, nutritional and metabolic disease, Personal history of other infectious and parasitic diseases (01/21/2014), Personal history of other malignant neoplasm of bronchus and lung (04/15/2018), Personal history of other medical treatment (10/11/2019), Personal history of other mental and behavioral disorders (04/14/2013), Personal history of other specified conditions, Personal history of other specified conditions, Personal history of other specified conditions (04/04/2013), and Puckering of macula, left eye (06/18/2015).    Past Surgical History:    has a past surgical history that includes Total hip arthroplasty (06/18/2012); Other surgical history (06/18/2012); Total hip arthroplasty (12/04/2012); Other surgical history (09/21/2017); Other surgical history (04/20/2015); Gallbladder surgery (09/28/2015); Total shoulder arthroplasty (07/24/2018); and Lung lobectomy (08/04/2012).    Family History:   No family history on file.    Social History:   Sonja Dallas  reports that  "she has been smoking cigarettes. She has never used smokeless tobacco.  reports current drug use. Drug: Marijuana.    EXAMINATION FINDINGS   /62 (BP Location: Left arm, Patient Position: Sitting)   Pulse 93   Temp 36.1 °C (97 °F) (Core)   Resp 18   Ht 1.653 m (5' 5.08\")   Wt 68.7 kg (151 lb 6.4 oz)   SpO2 100%   BMI 25.13 kg/m²   1.78 meters squared    Physical Exam  Not done since this was a virtual visit   LABS   Clinical Support on 11/13/2023   Component Date Value Ref Range Status    Flu A Result 11/13/2023 Not Detected  Not Detected Final    Flu B Result 11/13/2023 Not Detected  Not Detected Final    Coronavirus 2019, PCR 11/13/2023 Not Detected  Not Detected Final               Katharina Moralez, APRN-CNP                         "

## 2024-02-06 ENCOUNTER — TELEPHONE (OUTPATIENT)
Dept: ADMISSION | Facility: HOSPITAL | Age: 64
End: 2024-02-06

## 2024-02-06 DIAGNOSIS — D57.44 SICKLE CELL DISEASE, TYPE S BETA-PLUS THALASSEMIA (MULTI): ICD-10-CM

## 2024-02-06 RX ORDER — OXYCODONE HYDROCHLORIDE 5 MG/1
5 TABLET ORAL EVERY 6 HOURS PRN
Qty: 60 TABLET | Refills: 0 | Status: SHIPPED | OUTPATIENT
Start: 2024-02-06 | End: 2024-03-13 | Stop reason: SDUPTHER

## 2024-02-07 ENCOUNTER — APPOINTMENT (OUTPATIENT)
Dept: SURGERY | Facility: CLINIC | Age: 64
End: 2024-02-07
Payer: COMMERCIAL

## 2024-02-16 ENCOUNTER — APPOINTMENT (OUTPATIENT)
Dept: SURGERY | Facility: CLINIC | Age: 64
End: 2024-02-16

## 2024-02-23 ENCOUNTER — TELEMEDICINE (OUTPATIENT)
Dept: PRIMARY CARE | Facility: CLINIC | Age: 64
End: 2024-02-23
Payer: COMMERCIAL

## 2024-02-23 ENCOUNTER — LAB (OUTPATIENT)
Dept: LAB | Facility: LAB | Age: 64
End: 2024-02-23
Payer: COMMERCIAL

## 2024-02-23 DIAGNOSIS — Z02.89 ENCOUNTER FOR COMPLETION OF FORM WITH PATIENT: Primary | ICD-10-CM

## 2024-02-23 DIAGNOSIS — Z11.1 ENCOUNTER FOR SCREENING FOR RESPIRATORY TUBERCULOSIS: ICD-10-CM

## 2024-02-23 DIAGNOSIS — D57.00 SICKLE CELL DISEASE WITH CRISIS (MULTI): ICD-10-CM

## 2024-02-23 LAB
ABO GROUP (TYPE) IN BLOOD: NORMAL
ALBUMIN SERPL BCP-MCNC: 5 G/DL (ref 3.4–5)
ALP SERPL-CCNC: 64 U/L (ref 33–136)
ALT SERPL W P-5'-P-CCNC: 19 U/L (ref 7–45)
ANION GAP SERPL CALC-SCNC: 17 MMOL/L (ref 10–20)
ANTIBODY SCREEN: NORMAL
AST SERPL W P-5'-P-CCNC: 21 U/L (ref 9–39)
BASOPHILS # BLD AUTO: 0.03 X10*3/UL (ref 0–0.1)
BASOPHILS NFR BLD AUTO: 0.6 %
BILIRUB SERPL-MCNC: 0.9 MG/DL (ref 0–1.2)
BUN SERPL-MCNC: 9 MG/DL (ref 6–23)
CALCIUM SERPL-MCNC: 10.1 MG/DL (ref 8.6–10.6)
CHLORIDE SERPL-SCNC: 101 MMOL/L (ref 98–107)
CO2 SERPL-SCNC: 27 MMOL/L (ref 21–32)
CREAT SERPL-MCNC: 1.03 MG/DL (ref 0.5–1.05)
EGFRCR SERPLBLD CKD-EPI 2021: 62 ML/MIN/1.73M*2
EOSINOPHIL # BLD AUTO: 0.24 X10*3/UL (ref 0–0.7)
EOSINOPHIL NFR BLD AUTO: 4.8 %
ERYTHROCYTE [DISTWIDTH] IN BLOOD BY AUTOMATED COUNT: 14.7 % (ref 11.5–14.5)
FERRITIN SERPL-MCNC: 84 NG/ML (ref 8–150)
GLUCOSE SERPL-MCNC: 115 MG/DL (ref 74–99)
HCT VFR BLD AUTO: 31.1 % (ref 36–46)
HGB BLD-MCNC: 10.4 G/DL (ref 12–16)
HGB RETIC QN: 29 PG (ref 28–38)
IMM GRANULOCYTES # BLD AUTO: 0.01 X10*3/UL (ref 0–0.7)
IMM GRANULOCYTES NFR BLD AUTO: 0.2 % (ref 0–0.9)
IMMATURE RETIC FRACTION: 29.5 %
LDH SERPL L TO P-CCNC: 157 U/L (ref 84–246)
LYMPHOCYTES # BLD AUTO: 1.55 X10*3/UL (ref 1.2–4.8)
LYMPHOCYTES NFR BLD AUTO: 31.1 %
MCH RBC QN AUTO: 27.4 PG (ref 26–34)
MCHC RBC AUTO-ENTMCNC: 33.4 G/DL (ref 32–36)
MCV RBC AUTO: 82 FL (ref 80–100)
MONOCYTES # BLD AUTO: 0.44 X10*3/UL (ref 0.1–1)
MONOCYTES NFR BLD AUTO: 8.8 %
NEUTROPHILS # BLD AUTO: 2.72 X10*3/UL (ref 1.2–7.7)
NEUTROPHILS NFR BLD AUTO: 54.5 %
NRBC BLD-RTO: 0 /100 WBCS (ref 0–0)
PLATELET # BLD AUTO: 210 X10*3/UL (ref 150–450)
POTASSIUM SERPL-SCNC: 3.6 MMOL/L (ref 3.5–5.3)
PROT SERPL-MCNC: 6.9 G/DL (ref 6.4–8.2)
RBC # BLD AUTO: 3.8 X10*6/UL (ref 4–5.2)
RETICS #: 0.17 X10*6/UL (ref 0.02–0.08)
RETICS/RBC NFR AUTO: 4.4 % (ref 0.5–2)
RH FACTOR (ANTIGEN D): NORMAL
SODIUM SERPL-SCNC: 141 MMOL/L (ref 136–145)
WBC # BLD AUTO: 5 X10*3/UL (ref 4.4–11.3)

## 2024-02-23 PROCEDURE — 85045 AUTOMATED RETICULOCYTE COUNT: CPT

## 2024-02-23 PROCEDURE — 86900 BLOOD TYPING SEROLOGIC ABO: CPT

## 2024-02-23 PROCEDURE — 83020 HEMOGLOBIN ELECTROPHORESIS: CPT | Performed by: PATHOLOGY

## 2024-02-23 PROCEDURE — 83021 HEMOGLOBIN CHROMOTOGRAPHY: CPT

## 2024-02-23 PROCEDURE — 80053 COMPREHEN METABOLIC PANEL: CPT

## 2024-02-23 PROCEDURE — 86901 BLOOD TYPING SEROLOGIC RH(D): CPT

## 2024-02-23 PROCEDURE — 36415 COLL VENOUS BLD VENIPUNCTURE: CPT

## 2024-02-23 PROCEDURE — 83615 LACTATE (LD) (LDH) ENZYME: CPT

## 2024-02-23 PROCEDURE — 99213 OFFICE O/P EST LOW 20 MIN: CPT | Performed by: STUDENT IN AN ORGANIZED HEALTH CARE EDUCATION/TRAINING PROGRAM

## 2024-02-23 PROCEDURE — 82728 ASSAY OF FERRITIN: CPT

## 2024-02-23 PROCEDURE — 85025 COMPLETE CBC W/AUTO DIFF WBC: CPT

## 2024-02-23 PROCEDURE — 86850 RBC ANTIBODY SCREEN: CPT

## 2024-02-23 NOTE — PROGRESS NOTES
Subjective   Patient ID: Sonja Dallas is a 63 y.o. female who presents for Follow-up (Discuss paperwork for new job. ).        HPI    Pt is applying for a new job and needs paperwork to be completed   Needs TB screening   She dropped in paperwork this morning       Visit Vitals  Smoking Status Some Days      No LMP recorded.     Review of Systems    Constitutional : No feeling poorly / fevers/ chills / night sweats/ fatigue   Cardiovascular : No CP /Palpitations/ lower extremity edema / syncope   Respiratory : No Cough /JERRY/Dyspnea at rest   No night sweats/ cough , unintentional weight loss       All other systems have been reviewed and are negative for complaint       Physical Exam    Limited physical due to the virtual nature of this visit ( real time audio- visual )  Constitutional : Alert, in no distress     Pulm : Normal work of breathing   CNS : Moves all extremities symmetrically   Psych:  A , O X 3 normal mood and effect, speaks coherently     Assessment/Plan   Diagnoses and all orders for this visit:  Encounter for completion of form with patient  Encounter for screening for respiratory tuberculosis      TB screening by ROS negative   [facility]spot negative in 2023 August   Paperwork completed       Conditions addressed and mgmt as noted above.  Pertinent labs, images/ imaging reports , chart review was done .   Age appropriate labs / labs for mgmt of chronic medical conditions ordered, further mgmt pending the results.

## 2024-02-26 LAB
HEMOGLOBIN A2: 6.2 % (ref 2–3.5)
HEMOGLOBIN A: 17.4 % (ref 95.8–98)
HEMOGLOBIN F: 4.2 % (ref 0–2)
HEMOGLOBIN IDENTIFICATION INTERPRETATION: ABNORMAL
HEMOGLOBIN S: 72.2 %
PATH REVIEW-HGB IDENTIFICATION: ABNORMAL

## 2024-03-11 PROBLEM — N18.30 CKD (CHRONIC KIDNEY DISEASE) STAGE 3, GFR 30-59 ML/MIN (MULTI): Status: ACTIVE | Noted: 2024-03-11

## 2024-03-11 PROBLEM — Z98.890 HISTORY OF SHOULDER SURGERY: Status: ACTIVE | Noted: 2024-03-11

## 2024-03-11 PROBLEM — H52.03 HYPEROPIA OF BOTH EYES: Status: ACTIVE | Noted: 2024-03-11

## 2024-03-11 PROBLEM — I10 ESSENTIAL HYPERTENSION, BENIGN: Status: ACTIVE | Noted: 2024-03-11

## 2024-03-11 PROBLEM — T50.905A DRUG-INDUCED PRURITUS: Status: ACTIVE | Noted: 2024-03-11

## 2024-03-11 PROBLEM — E66.3 OVERWEIGHT: Status: ACTIVE | Noted: 2024-03-11

## 2024-03-11 PROBLEM — Z86.59 HISTORY OF DEPRESSION: Status: ACTIVE | Noted: 2024-03-11

## 2024-03-11 PROBLEM — R07.81 PLEURITIC PAIN: Status: ACTIVE | Noted: 2024-03-11

## 2024-03-11 PROBLEM — Z96.642 CHRONIC HIP PAIN AFTER TOTAL REPLACEMENT OF LEFT HIP JOINT: Status: ACTIVE | Noted: 2024-03-11

## 2024-03-11 PROBLEM — M25.559 ARTHRALGIA OF HIP: Status: ACTIVE | Noted: 2024-03-11

## 2024-03-11 PROBLEM — R16.1 SPLENOMEGALY: Status: ACTIVE | Noted: 2024-03-11

## 2024-03-11 PROBLEM — M19.012 PRIMARY OSTEOARTHRITIS, LEFT SHOULDER: Status: ACTIVE | Noted: 2024-03-11

## 2024-03-11 PROBLEM — H02.88A MEIBOMIAN GLAND DYSFUNCTION (MGD) OF UPPER AND LOWER EYELID OF RIGHT EYE: Status: ACTIVE | Noted: 2024-03-11

## 2024-03-11 PROBLEM — R87.612 LOW GRADE SQUAMOUS INTRAEPITHELIAL LESION (LGSIL) AT RISK FOR HIGH GRADE SQUAMOUS INTRAEPITHELIAL LESION (HGSIL) ON CYTOLOGIC SMEAR OF CERVIX: Status: ACTIVE | Noted: 2024-03-11

## 2024-03-11 PROBLEM — E28.319 PREMATURE MENOPAUSE: Status: ACTIVE | Noted: 2024-03-11

## 2024-03-11 PROBLEM — R89.6 ABNORMAL CYTOLOGY: Status: ACTIVE | Noted: 2023-07-18

## 2024-03-11 PROBLEM — F33.42 RECURRENT MAJOR DEPRESSION IN FULL REMISSION (CMS-HCC): Status: ACTIVE | Noted: 2023-04-06

## 2024-03-11 PROBLEM — H33.42 TRACTION DETACHMENT OF LEFT RETINA: Status: ACTIVE | Noted: 2024-03-11

## 2024-03-11 PROBLEM — K11.8 MASS OF LEFT PAROTID GLAND: Status: ACTIVE | Noted: 2024-03-11

## 2024-03-11 PROBLEM — I83.10 VARICOSE VEINS OF UNSPECIFIED LOWER EXTREMITY WITH INFLAMMATION: Status: ACTIVE | Noted: 2017-05-24

## 2024-03-11 PROBLEM — M85.89 OSTEOPENIA OF MULTIPLE SITES: Status: ACTIVE | Noted: 2024-03-11

## 2024-03-11 PROBLEM — H52.00 HYPEROPIA: Status: ACTIVE | Noted: 2024-03-11

## 2024-03-11 PROBLEM — H18.519 CORNEA GUTTATA: Status: ACTIVE | Noted: 2024-03-11

## 2024-03-11 PROBLEM — Z11.1 TUBERCULOSIS SCREENING: Status: ACTIVE | Noted: 2024-03-11

## 2024-03-11 PROBLEM — I82.629 DEEP VEIN THROMBOSIS (DVT) OF UPPER EXTREMITY (MULTI): Status: ACTIVE | Noted: 2024-03-11

## 2024-03-11 PROBLEM — M87.073: Status: ACTIVE | Noted: 2023-05-19

## 2024-03-11 PROBLEM — F11.93 OPIOID WITHDRAWAL (MULTI): Status: ACTIVE | Noted: 2024-03-11

## 2024-03-11 PROBLEM — M25.552 CHRONIC HIP PAIN AFTER TOTAL REPLACEMENT OF LEFT HIP JOINT: Status: ACTIVE | Noted: 2024-03-11

## 2024-03-11 PROBLEM — M26.629 TMJ PAIN DYSFUNCTION SYNDROME: Status: ACTIVE | Noted: 2024-03-11

## 2024-03-11 PROBLEM — R73.9 HYPERGLYCEMIA: Status: ACTIVE | Noted: 2022-11-02

## 2024-03-11 PROBLEM — H92.09 OTALGIA: Status: ACTIVE | Noted: 2024-03-11

## 2024-03-11 PROBLEM — H91.90 HEARING LOSS: Status: ACTIVE | Noted: 2024-03-11

## 2024-03-11 PROBLEM — G89.29 CHRONIC HIP PAIN AFTER TOTAL REPLACEMENT OF LEFT HIP JOINT: Status: ACTIVE | Noted: 2024-03-11

## 2024-03-11 PROBLEM — J30.9 ALLERGIC RHINITIS: Status: ACTIVE | Noted: 2024-03-11

## 2024-03-11 PROBLEM — H92.02 OTALGIA, LEFT: Status: ACTIVE | Noted: 2024-03-11

## 2024-03-11 PROBLEM — N17.9 ACUTE KIDNEY INJURY (CMS-HCC): Status: ACTIVE | Noted: 2024-03-11

## 2024-03-11 PROBLEM — D12.6 ADENOMATOUS POLYP OF COLON: Status: ACTIVE | Noted: 2024-03-11

## 2024-03-11 PROBLEM — M25.512 BILATERAL SHOULDER PAIN: Status: ACTIVE | Noted: 2024-03-11

## 2024-03-11 PROBLEM — M25.511 BILATERAL SHOULDER PAIN: Status: ACTIVE | Noted: 2024-03-11

## 2024-03-11 PROBLEM — H52.202 ASTIGMATISM OF LEFT EYE: Status: ACTIVE | Noted: 2024-03-11

## 2024-03-11 PROBLEM — R10.9 NONSPECIFIC ABDOMINAL PAIN: Status: ACTIVE | Noted: 2024-03-11

## 2024-03-11 PROBLEM — R79.89 D-DIMER, ELEVATED: Status: ACTIVE | Noted: 2024-03-11

## 2024-03-11 PROBLEM — L29.89 DRUG-INDUCED PRURITUS: Status: ACTIVE | Noted: 2024-03-11

## 2024-03-11 PROBLEM — G89.29 CHRONIC FEMALE PELVIC PAIN: Status: ACTIVE | Noted: 2024-03-11

## 2024-03-11 PROBLEM — K21.9 GASTROESOPHAGEAL REFLUX DISEASE: Status: ACTIVE | Noted: 2024-03-11

## 2024-03-11 PROBLEM — F43.20 ADJUSTMENT DISORDER: Status: ACTIVE | Noted: 2024-03-11

## 2024-03-11 PROBLEM — L29.8 DRUG-INDUCED PRURITUS: Status: ACTIVE | Noted: 2024-03-11

## 2024-03-11 PROBLEM — E87.6 HYPOKALEMIA: Status: ACTIVE | Noted: 2023-05-19

## 2024-03-11 PROBLEM — T84.84XD PAIN DUE TO HIP JOINT PROSTHESIS, SUBSEQUENT ENCOUNTER: Status: ACTIVE | Noted: 2024-03-11

## 2024-03-11 PROBLEM — Z86.79 HISTORY OF HYPERTENSION: Status: ACTIVE | Noted: 2024-03-11

## 2024-03-11 PROBLEM — D12.7 ADENOMA DETERMINED BY COLORECTAL BIOPSY: Status: ACTIVE | Noted: 2024-03-11

## 2024-03-11 PROBLEM — F32.4 MAJOR DEPRESSIVE DISORDER WITH SINGLE EPISODE, IN PARTIAL REMISSION (CMS-HCC): Status: ACTIVE | Noted: 2024-03-11

## 2024-03-11 PROBLEM — R50.9 FEVER AND CHILLS: Status: ACTIVE | Noted: 2024-03-11

## 2024-03-11 PROBLEM — Z86.2 HISTORY OF IMMUNE THROMBOCYTOPENIA: Status: ACTIVE | Noted: 2024-03-11

## 2024-03-11 PROBLEM — F11.159: Status: ACTIVE | Noted: 2024-03-11

## 2024-03-11 PROBLEM — R05.9 COUGH: Status: ACTIVE | Noted: 2024-03-11

## 2024-03-11 PROBLEM — F11.90 OPIATE USE: Status: ACTIVE | Noted: 2024-03-11

## 2024-03-11 PROBLEM — Z86.19 HISTORY OF BACTERIAL INFECTION: Status: ACTIVE | Noted: 2024-03-11

## 2024-03-11 PROBLEM — E78.5 DYSLIPIDEMIA: Status: ACTIVE | Noted: 2024-03-11

## 2024-03-11 PROBLEM — D57.40: Status: ACTIVE | Noted: 2024-03-11

## 2024-03-11 PROBLEM — Z98.890 HX OF COLONOSCOPY: Status: ACTIVE | Noted: 2024-03-11

## 2024-03-11 PROBLEM — R10.9 FLANK PAIN: Status: ACTIVE | Noted: 2024-03-11

## 2024-03-11 PROBLEM — H43.12 VITREOUS HEMORRHAGE OF LEFT EYE (MULTI): Status: ACTIVE | Noted: 2024-03-11

## 2024-03-11 PROBLEM — M19.90 UNSPECIFIED OSTEOARTHRITIS, UNSPECIFIED SITE: Status: ACTIVE | Noted: 2022-11-02

## 2024-03-11 PROBLEM — Z96.649 PAIN DUE TO HIP JOINT PROSTHESIS, SUBSEQUENT ENCOUNTER: Status: ACTIVE | Noted: 2024-03-11

## 2024-03-11 PROBLEM — G89.29 OTHER CHRONIC PAIN: Status: ACTIVE | Noted: 2023-04-06

## 2024-03-11 PROBLEM — Z23 VARICELLA VACCINATION: Status: ACTIVE | Noted: 2024-03-11

## 2024-03-11 PROBLEM — H36.89 SICKLE CELL RETINOPATHY (MULTI): Status: ACTIVE | Noted: 2024-03-11

## 2024-03-11 PROBLEM — M54.50 LOW BACK PAIN: Status: ACTIVE | Noted: 2024-03-11

## 2024-03-11 PROBLEM — M25.551 RIGHT HIP PAIN: Status: ACTIVE | Noted: 2024-03-11

## 2024-03-11 PROBLEM — D57.1 SICKLE CELL RETINOPATHY (MULTI): Status: ACTIVE | Noted: 2024-03-11

## 2024-03-11 PROBLEM — Z96.619 STATUS POST TOTAL SHOULDER REPLACEMENT: Status: ACTIVE | Noted: 2024-03-11

## 2024-03-11 PROBLEM — D57.00 VASOOCCLUSIVE SICKLE CELL CRISIS (MULTI): Status: ACTIVE | Noted: 2024-03-11

## 2024-03-11 PROBLEM — M79.603 PAIN IN ARM: Status: ACTIVE | Noted: 2022-12-09

## 2024-03-11 PROBLEM — M62.838 NECK MUSCLE SPASM: Status: ACTIVE | Noted: 2024-03-11

## 2024-03-11 PROBLEM — D64.9 ANEMIA: Status: ACTIVE | Noted: 2024-03-11

## 2024-03-11 PROBLEM — R10.2 CHRONIC FEMALE PELVIC PAIN: Status: ACTIVE | Noted: 2024-03-11
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
## 2024-03-11 PROBLEM — R07.9 CHEST PAIN: Status: ACTIVE | Noted: 2024-03-11

## 2024-03-11 PROBLEM — Z85.118 HISTORY OF MALIGNANT NEOPLASM OF BRONCHUS AND LUNG: Status: ACTIVE | Noted: 2019-03-05

## 2024-03-11 PROBLEM — L70.0 BLACK HEAD: Status: ACTIVE | Noted: 2024-03-11

## 2024-03-11 PROBLEM — I15.9 SECONDARY HYPERTENSION: Status: ACTIVE | Noted: 2023-12-18

## 2024-03-11 PROBLEM — R10.9 STOMACH PAIN: Status: ACTIVE | Noted: 2024-03-11

## 2024-03-11 PROBLEM — K59.09 CHRONIC CONSTIPATION: Status: ACTIVE | Noted: 2024-03-11

## 2024-03-11 PROBLEM — R79.89 ABNORMAL TSH: Status: ACTIVE | Noted: 2024-03-11

## 2024-03-11 PROBLEM — J06.9 UPPER RESPIRATORY TRACT INFECTION: Status: ACTIVE | Noted: 2024-03-11

## 2024-03-11 PROBLEM — H33.20 RETINAL DETACHMENT: Status: ACTIVE | Noted: 2024-03-11

## 2024-03-11 RX ORDER — DOCUSATE SODIUM 100 MG/1
100 CAPSULE, LIQUID FILLED ORAL 2 TIMES DAILY
COMMUNITY
Start: 2011-03-25 | End: 2024-06-04 | Stop reason: WASHOUT

## 2024-03-11 RX ORDER — NAPROXEN SODIUM 220 MG
TABLET ORAL
COMMUNITY
Start: 2016-02-18 | End: 2024-06-04 | Stop reason: WASHOUT

## 2024-03-11 RX ORDER — BUTALBITAL, ACETAMINOPHEN AND CAFFEINE 300; 40; 50 MG/1; MG/1; MG/1
CAPSULE ORAL
COMMUNITY
Start: 2022-08-10

## 2024-03-11 RX ORDER — METHYLPREDNISOLONE 4 MG/1
TABLET ORAL
COMMUNITY
Start: 2020-12-05 | End: 2024-06-04 | Stop reason: WASHOUT

## 2024-03-11 RX ORDER — FERROUS SULFATE 325(65) MG
1 TABLET ORAL DAILY
COMMUNITY
Start: 2005-03-17

## 2024-03-11 RX ORDER — POLYETHYLENE GLYCOL 3350 17 G/17G
POWDER, FOR SOLUTION ORAL
COMMUNITY
Start: 2021-06-21 | End: 2024-06-04 | Stop reason: WASHOUT

## 2024-03-11 RX ORDER — PREDNISOLONE ACETATE 10 MG/ML
SUSPENSION/ DROPS OPHTHALMIC 4 TIMES DAILY
COMMUNITY
Start: 2022-04-28 | End: 2024-06-04 | Stop reason: WASHOUT

## 2024-03-11 RX ORDER — FLUTICASONE PROPIONATE 50 MCG
SPRAY, SUSPENSION (ML) NASAL
COMMUNITY
Start: 2012-06-25 | End: 2024-06-04 | Stop reason: WASHOUT

## 2024-03-11 RX ORDER — OXYCODONE AND ACETAMINOPHEN 5; 325 MG/1; MG/1
TABLET ORAL
COMMUNITY
Start: 2013-12-01 | End: 2024-06-04 | Stop reason: WASHOUT

## 2024-03-11 RX ORDER — ESOMEPRAZOLE MAGNESIUM 40 MG/1
40 CAPSULE, DELAYED RELEASE ORAL
COMMUNITY
Start: 2011-03-25 | End: 2024-06-04 | Stop reason: WASHOUT

## 2024-03-11 RX ORDER — ALBUTEROL SULFATE 90 UG/1
AEROSOL, METERED RESPIRATORY (INHALATION)
COMMUNITY
Start: 2016-04-21 | End: 2024-06-04 | Stop reason: WASHOUT

## 2024-03-11 RX ORDER — FLAXSEED OIL 1000 MG
CAPSULE ORAL
COMMUNITY
Start: 2020-12-22

## 2024-03-11 RX ORDER — CYANOCOBALAMIN (VITAMIN B-12) 500 MCG
TABLET ORAL
COMMUNITY

## 2024-03-11 RX ORDER — MOMETASONE FUROATE 50 UG/1
SPRAY, METERED NASAL
COMMUNITY
Start: 2008-09-09 | End: 2024-06-04 | Stop reason: WASHOUT

## 2024-03-11 RX ORDER — AZITHROMYCIN 250 MG/1
TABLET, FILM COATED ORAL
COMMUNITY
Start: 2021-09-29 | End: 2024-06-04 | Stop reason: WASHOUT

## 2024-03-11 RX ORDER — PREDNISOLONE ACETATE 10 MG/ML
SUSPENSION/ DROPS OPHTHALMIC EVERY 6 HOURS
COMMUNITY
Start: 2021-02-18 | End: 2024-06-04 | Stop reason: WASHOUT

## 2024-03-11 RX ORDER — AMITRIPTYLINE HYDROCHLORIDE 50 MG/1
TABLET, FILM COATED ORAL
COMMUNITY
Start: 2013-03-14 | End: 2024-06-04 | Stop reason: WASHOUT

## 2024-03-11 RX ORDER — LORATADINE 10 MG/1
TABLET ORAL
COMMUNITY
Start: 2019-01-11 | End: 2024-06-04 | Stop reason: WASHOUT

## 2024-03-11 RX ORDER — AMOXICILLIN AND CLAVULANATE POTASSIUM 500; 125 MG/1; MG/1
TABLET, FILM COATED ORAL
COMMUNITY
Start: 2021-01-25 | End: 2024-06-04 | Stop reason: WASHOUT

## 2024-03-11 RX ORDER — TALC
POWDER (GRAM) TOPICAL
COMMUNITY
Start: 2019-01-09 | End: 2024-06-04 | Stop reason: WASHOUT

## 2024-03-11 RX ORDER — VERAPAMIL HYDROCHLORIDE 120 MG/1
TABLET, FILM COATED ORAL
COMMUNITY
Start: 2008-05-19 | End: 2024-06-04 | Stop reason: WASHOUT

## 2024-03-13 ENCOUNTER — TELEPHONE (OUTPATIENT)
Dept: ADMISSION | Facility: HOSPITAL | Age: 64
End: 2024-03-13
Payer: COMMERCIAL

## 2024-03-13 DIAGNOSIS — D57.44 SICKLE CELL DISEASE, TYPE S BETA-PLUS THALASSEMIA (MULTI): ICD-10-CM

## 2024-03-13 RX ORDER — OXYCODONE HYDROCHLORIDE 5 MG/1
5 TABLET ORAL EVERY 6 HOURS PRN
Qty: 60 TABLET | Refills: 0 | Status: SHIPPED | OUTPATIENT
Start: 2024-03-13 | End: 2024-06-04 | Stop reason: WASHOUT

## 2024-03-13 NOTE — TELEPHONE ENCOUNTER
Pt is requesting a refill on her Oxycodone 5mg- didn't specify pharmacy. Message sent to the team.

## 2024-03-22 ENCOUNTER — OFFICE VISIT (OUTPATIENT)
Dept: SURGERY | Facility: CLINIC | Age: 64
End: 2024-03-22
Payer: COMMERCIAL

## 2024-03-22 VITALS
WEIGHT: 152 LBS | DIASTOLIC BLOOD PRESSURE: 80 MMHG | SYSTOLIC BLOOD PRESSURE: 135 MMHG | HEART RATE: 80 BPM | BODY MASS INDEX: 25.95 KG/M2 | RESPIRATION RATE: 16 BRPM | HEIGHT: 64 IN

## 2024-03-22 DIAGNOSIS — D57.00 SICKLE CELL DISEASE WITH CRISIS (MULTI): ICD-10-CM

## 2024-03-22 DIAGNOSIS — R10.12 LUQ PAIN: Primary | ICD-10-CM

## 2024-03-22 PROCEDURE — 3079F DIAST BP 80-89 MM HG: CPT | Performed by: STUDENT IN AN ORGANIZED HEALTH CARE EDUCATION/TRAINING PROGRAM

## 2024-03-22 PROCEDURE — 3075F SYST BP GE 130 - 139MM HG: CPT | Performed by: STUDENT IN AN ORGANIZED HEALTH CARE EDUCATION/TRAINING PROGRAM

## 2024-03-22 PROCEDURE — 99213 OFFICE O/P EST LOW 20 MIN: CPT | Performed by: STUDENT IN AN ORGANIZED HEALTH CARE EDUCATION/TRAINING PROGRAM

## 2024-03-22 ASSESSMENT — PAIN SCALES - GENERAL: PAINLEVEL: 0-NO PAIN

## 2024-03-22 NOTE — PROGRESS NOTES
History Of Present Illness  Patient is a 63 y.o. female with a fairly significant past medical history presents with a longstanding history of left upper quadrant abdominal pain.  She is concerned that she has a hernia in the area.  She states that the pain does seem to come about every day but these episodes are short in nature, she stops what she is currently doing, and the pain spontaneously resolves.  Nothing seems to be associated with these events.  She has not had any episodes where the pain is not resolved.  She denies any nausea, vomiting, chest pain, shortness of breath.  She denies feeling any bulge but does localize pain to the left upper quadrant externally.  Her surgical history including laparoscopic cholecystectomy and multiple orthopedic surgeries.  She presents today to discuss in further workup and or management.     Past Medical History  Past Medical History:   Diagnosis Date    Acute embolism and thrombosis of superficial veins of left upper extremity 03/23/2019    Acute thrombosis of left basilic vein    Adjustment disorder with mixed anxiety and depressed mood 06/18/2012    Adjustment disorder with mixed anxiety and depressed mood    Encounter for immunization 12/21/2020    Need for Td vaccine    Encounter for immunization 10/11/2019    Need for influenza vaccination    Hb-SS disease with crisis, unspecified (CMS/[facility]) 02/08/2018    Sickle cell pain crisis    Idiopathic aseptic necrosis of unspecified bone (CMS/[facility]) 05/06/2013    Aseptic necrosis    Opioid use, unspecified with withdrawal (CMS/[facility]) 05/06/2013    Opioid withdrawal    Other conditions influencing health status 06/18/2012    Benzodiazepine Dependence - Episodic    Other conditions influencing health status 12/04/2012    Urinary Tract Infection    Personal history of diseases of the blood and blood-forming organs and certain disorders involving the immune mechanism     History of sickle cell trait    Personal history of diseases of  the blood and blood-forming organs and certain disorders involving the immune mechanism 06/18/2012    History of thrombotic thrombocytopenic purpura    Personal history of other diseases of the circulatory system     History of hypertension    Personal history of other diseases of the musculoskeletal system and connective tissue     History of arthritis    Personal history of other diseases of the respiratory system     History of asthma    Personal history of other endocrine, nutritional and metabolic disease     History of obesity    Personal history of other infectious and parasitic diseases 01/21/2014    Personal history of Helicobacter infection    Personal history of other malignant neoplasm of bronchus and lung 04/15/2018    History of malignant neoplasm of lung    Personal history of other medical treatment 10/11/2019    History of screening mammography    Personal history of other mental and behavioral disorders 04/14/2013    History of depression    Personal history of other specified conditions     History of heartburn    Personal history of other specified conditions     History of ulceration    Personal history of other specified conditions 04/04/2013    History of abdominal pain    Puckering of macula, left eye 06/18/2015    ERM OS (epiretinal membrane, left eye)       Surgical History  Past Surgical History:   Procedure Laterality Date    GALLBLADDER SURGERY  09/28/2015    Gallbladder Surgery    LUNG LOBECTOMY  08/04/2012    Lung Lobectomy    OTHER SURGICAL HISTORY  06/18/2012    Reported Hx Of Shoulder Joint Replacement    OTHER SURGICAL HISTORY  09/21/2017    Lung Lobectomy Partial    OTHER SURGICAL HISTORY  04/20/2015    Cervical Conization    TOTAL HIP ARTHROPLASTY  06/18/2012    Total Hip Replacement    TOTAL HIP ARTHROPLASTY  12/04/2012    Hip Replacement    TOTAL SHOULDER ARTHROPLASTY  07/24/2018    Shoulder Arthroplasty Total Shoulder Replacement        Social History  She reports that she has  "been smoking cigarettes. She has never used smokeless tobacco. She reports current alcohol use. She reports current drug use. Drug: Marijuana.    Family History  No family history on file.     Allergies  Gabapentin; Gloves, latex with aloe vera; Latex; Morphine; and Naproxen    Review of Systems  - CONSTITUTIONAL: Denies fever and chills.  - HEENT: Denies changes in vision and hearing.  - RESPIRATORY: Denies SOB and cough.  - CV: Denies palpitations and CP.  - GI: See HPI  - : Denies dysuria and urinary frequency.  - MSK: Denies myalgia and joint pain.  - SKIN: Denies rash and pruritus.  - NEUROLOGICAL: Denies headache and syncope.  - PSYCHIATRIC: Denies recent changes in mood. Denies anxiety and depression.     Physical Exam  - GENERAL: Alert and oriented x 3. No acute distress. Well-nourished.  - EYES: EOMI. Anicteric.  - HENT: Moist mucous membranes. No scleral icterus. No cervical lymphadenopathy.  - LUNGS: Breathing comfortably on room air. No accessory muscle use, no distress.  - CARDIOVASCULAR: Regular rate and rhythm. No murmur. No JVD.  - ABDOMEN: Soft, non-tender and non-distended.  No obvious hernia defect in the upper abdomen, nontender to palpation.  - EXTREMITIES: No edema. Non-tender.  - SKIN: No rashes or lesions. Warm.  - NEUROLOGIC: No focal neurological deficits. CN II-XII grossly intact, but not individually tested.  - PSYCHIATRIC: Cooperative. Appropriate mood and affect.    Last Recorded Vitals  Blood pressure 135/80, pulse 80, resp. rate 16, height 1.626 m (5' 4\"), weight 68.9 kg (152 lb).    Relevant Results  No results found.     Assessment and Plan  Patient is a 63 y.o. female who presents for further workup and/or management of intermittent left upper quadrant abdominal pain, she is concerned about a possible hernia in this area.  On exam today, I do not appreciate a fascial defect, however her presentation could certainly be secondary to an abdominal wall defect not easily palpable on " exam.  Therefore, I recommended a noncontrasted CT scan of the abdomen pelvis to further evaluate this area.  The etiology, pathophysiology of abdominal wall hernias were explained to the patient she voiced understanding.  We will obtain the above imaging and further workup and/or management with pending the results of the imaging.    Naga Howard MD

## 2024-04-09 ENCOUNTER — APPOINTMENT (OUTPATIENT)
Dept: PRIMARY CARE | Facility: CLINIC | Age: 64
End: 2024-04-09
Payer: COMMERCIAL

## 2024-04-16 ENCOUNTER — HOSPITAL ENCOUNTER (OUTPATIENT)
Dept: RADIOLOGY | Facility: CLINIC | Age: 64
Discharge: HOME | End: 2024-04-16
Payer: COMMERCIAL

## 2024-04-16 DIAGNOSIS — R10.12 LUQ PAIN: ICD-10-CM

## 2024-04-16 PROCEDURE — 74176 CT ABD & PELVIS W/O CONTRAST: CPT | Performed by: STUDENT IN AN ORGANIZED HEALTH CARE EDUCATION/TRAINING PROGRAM

## 2024-04-16 PROCEDURE — A9698 NON-RAD CONTRAST MATERIALNOC: HCPCS | Performed by: STUDENT IN AN ORGANIZED HEALTH CARE EDUCATION/TRAINING PROGRAM

## 2024-04-16 PROCEDURE — 2550000001 HC RX 255 CONTRASTS: Performed by: STUDENT IN AN ORGANIZED HEALTH CARE EDUCATION/TRAINING PROGRAM

## 2024-04-16 PROCEDURE — 74176 CT ABD & PELVIS W/O CONTRAST: CPT

## 2024-04-16 RX ADMIN — IOHEXOL 500 ML: 12 SOLUTION ORAL at 09:52

## 2024-04-29 ENCOUNTER — LAB (OUTPATIENT)
Dept: LAB | Facility: HOSPITAL | Age: 64
End: 2024-04-29
Payer: COMMERCIAL

## 2024-04-29 ENCOUNTER — OFFICE VISIT (OUTPATIENT)
Dept: HEMATOLOGY/ONCOLOGY | Facility: HOSPITAL | Age: 64
End: 2024-04-29
Payer: COMMERCIAL

## 2024-04-29 DIAGNOSIS — D57.00 SICKLE CELL DISEASE WITH CRISIS (MULTI): ICD-10-CM

## 2024-04-29 LAB
ALBUMIN SERPL BCP-MCNC: 4.6 G/DL (ref 3.4–5)
ALP SERPL-CCNC: 63 U/L (ref 33–136)
ALT SERPL W P-5'-P-CCNC: 13 U/L (ref 7–45)
ANION GAP SERPL CALC-SCNC: 14 MMOL/L (ref 10–20)
AST SERPL W P-5'-P-CCNC: 21 U/L (ref 9–39)
BASOPHILS # BLD AUTO: 0.02 X10*3/UL (ref 0–0.1)
BASOPHILS NFR BLD AUTO: 0.4 %
BILIRUB SERPL-MCNC: 1.2 MG/DL (ref 0–1.2)
BUN SERPL-MCNC: 9 MG/DL (ref 6–23)
CALCIUM SERPL-MCNC: 9.8 MG/DL (ref 8.6–10.3)
CHLORIDE SERPL-SCNC: 104 MMOL/L (ref 98–107)
CO2 SERPL-SCNC: 28 MMOL/L (ref 21–32)
CREAT SERPL-MCNC: 0.95 MG/DL (ref 0.5–1.05)
EGFRCR SERPLBLD CKD-EPI 2021: 68 ML/MIN/1.73M*2
EOSINOPHIL # BLD AUTO: 0.3 X10*3/UL (ref 0–0.7)
EOSINOPHIL NFR BLD AUTO: 6.1 %
ERYTHROCYTE [DISTWIDTH] IN BLOOD BY AUTOMATED COUNT: 14.8 % (ref 11.5–14.5)
GLUCOSE SERPL-MCNC: 95 MG/DL (ref 74–99)
HCT VFR BLD AUTO: 30.1 % (ref 36–46)
HGB BLD-MCNC: 10.2 G/DL (ref 12–16)
HGB RETIC QN: 30 PG (ref 28–38)
IMM GRANULOCYTES # BLD AUTO: 0.02 X10*3/UL (ref 0–0.7)
IMM GRANULOCYTES NFR BLD AUTO: 0.4 % (ref 0–0.9)
IMMATURE RETIC FRACTION: 33.3 %
LDH SERPL L TO P-CCNC: 173 U/L (ref 84–246)
LYMPHOCYTES # BLD AUTO: 1.2 X10*3/UL (ref 1.2–4.8)
LYMPHOCYTES NFR BLD AUTO: 24.3 %
MCH RBC QN AUTO: 27.5 PG (ref 26–34)
MCHC RBC AUTO-ENTMCNC: 33.9 G/DL (ref 32–36)
MCV RBC AUTO: 81 FL (ref 80–100)
MONOCYTES # BLD AUTO: 0.43 X10*3/UL (ref 0.1–1)
MONOCYTES NFR BLD AUTO: 8.7 %
NEUTROPHILS # BLD AUTO: 2.96 X10*3/UL (ref 1.2–7.7)
NEUTROPHILS NFR BLD AUTO: 60.1 %
NRBC BLD-RTO: 0 /100 WBCS (ref 0–0)
PLATELET # BLD AUTO: 171 X10*3/UL (ref 150–450)
POTASSIUM SERPL-SCNC: 3.6 MMOL/L (ref 3.5–5.3)
PROT SERPL-MCNC: 7 G/DL (ref 6.4–8.2)
RBC # BLD AUTO: 3.71 X10*6/UL (ref 4–5.2)
RETICS #: 0.16 X10*6/UL (ref 0.02–0.08)
RETICS/RBC NFR AUTO: 4.4 % (ref 0.5–2)
SODIUM SERPL-SCNC: 142 MMOL/L (ref 136–145)
WBC # BLD AUTO: 4.9 X10*3/UL (ref 4.4–11.3)

## 2024-04-29 PROCEDURE — 85025 COMPLETE CBC W/AUTO DIFF WBC: CPT

## 2024-04-29 PROCEDURE — 85045 AUTOMATED RETICULOCYTE COUNT: CPT

## 2024-04-29 PROCEDURE — 84075 ASSAY ALKALINE PHOSPHATASE: CPT

## 2024-04-29 PROCEDURE — 36415 COLL VENOUS BLD VENIPUNCTURE: CPT

## 2024-04-29 PROCEDURE — 99214 OFFICE O/P EST MOD 30 MIN: CPT | Performed by: INTERNAL MEDICINE

## 2024-04-29 PROCEDURE — 83615 LACTATE (LD) (LDH) ENZYME: CPT

## 2024-04-29 ASSESSMENT — ENCOUNTER SYMPTOMS
CHEST TIGHTNESS: 0
WOUND: 0
FEVER: 0
EYE PROBLEMS: 0
HEADACHES: 1
DEPRESSION: 1
DIARRHEA: 0
BLOOD IN STOOL: 0
COUGH: 0
BACK PAIN: 1
CHILLS: 0
EXTREMITY WEAKNESS: 0
WHEEZING: 0
CONSTIPATION: 0
NECK PAIN: 0
HEMATOLOGIC/LYMPHATIC NEGATIVE: 1
LEG SWELLING: 0
ABDOMINAL PAIN: 1
NECK STIFFNESS: 0
NERVOUS/ANXIOUS: 0
NAUSEA: 0
DIZZINESS: 0
LIGHT-HEADEDNESS: 0
VOMITING: 0
PALPITATIONS: 0
MYALGIAS: 0
ARTHRALGIAS: 0
SORE THROAT: 0
FATIGUE: 0

## 2024-04-29 NOTE — PROGRESS NOTES
Patient ID: Sonja Dallas   Visit Type: Follow up visit       - labs pending  - ref to GI-  CT scan indicated hiatal hernia  - fuv in July for comp visit     ASSESSMENT AND PLAN  Sonja Dallas is 63 y.o. 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 She is not on DMT. Her pain is currently well controlled with current pain regimen. No changes in her current pain regimen and she will continue to receive   Oral tylenol and or ibuprofen as needed for mild to moderate pain   Oral oxycodone 5 mg every 4-6 hours prn for moderate, severe and breakthrough pain. Sent a new prescription for 60 tabs   Flexeril as needed as muscle relaxant   Discussed non pharmacologic methods of pain control   Reviewed OARRS  Continue PT  Discussed importance of having naloxone readily available and a new prescription sent for patient   Follow up with ophthalmology for cataract surgery as scheduled     History of depression and anxiety currently well controlled with her medications    Continue Wellbutrin  mg daily and continue to follow up with her therapist      Chronic anemia secondary to SCD with most recent hemoglobin of 10.6 g/dl which is around her baseline    Daily folic acid   Repeat CBC at her next encounter     History of abdominal hernia per verbal report. She will follow up with her PCP/surgeon and will keep us informed of any planned surgery so we can make recommendations for perioperative management referred to general surg- CT scan indicated small inguineal hernias and hiatal hernia. Referred to GI for hiatal hernia.     Splenomegaly: noted on CT scan, stable and no increase in size. Reviewed with Dr Pascal, no need for surgical intervention.     Elevated Cr of 1.18 from 7/13/23   Renal panel and urine albumin at her next office visit encounter- obtain at comp visit.     History of essential hypertension     Continue current antihypertensives with Hydrochlorothiazide triamterene (Maxzide) 75-50 mg daily   Follow blood pressure at her next office visit     .     Chief Complaint: Follow up for HB S beta + thal SCD     Interval History: Sonja Dallas presents for fuv. She is doing okay, but recently lost her brother this month. She did start working as a . The job has been going okay, but has its challenging.  Referred to gen surg and had CT scan, discussed results and will refer to GI for hiatal hernia. She has had some HA lately, but thinks its due to not eating during her shifts.       Review of System:   Review of Systems   Constitutional:  Negative for chills, fatigue and fever.   HENT:   Negative for nosebleeds and sore throat.    Eyes:  Negative for eye problems.   Respiratory:  Negative for chest tightness, cough and wheezing.    Cardiovascular:  Negative for leg swelling and palpitations.   Gastrointestinal:  Positive for abdominal pain. Negative for blood in stool, constipation, diarrhea, nausea and vomiting.   Genitourinary: Negative.     Musculoskeletal:  Positive for back pain. Negative for arthralgias, gait problem, myalgias, neck pain and neck stiffness.   Skin:  Negative for rash and wound.   Neurological:  Positive for headaches. Negative for dizziness, extremity weakness, gait problem and light-headedness.   Hematological: Negative.    Psychiatric/Behavioral:  Positive for depression. The patient is not nervous/anxious.         Allergies:   Allergies   Allergen Reactions    Gabapentin Other     Abdominal pain    Gloves, Latex With Aloe Vera Swelling     Latex Gloves MISC    Latex Swelling     gloves    Morphine Unknown     itching    Naproxen Unknown       Current Medications:   Outpatient Encounter Medications as of 4/29/2024   Medication Sig Dispense Refill    albuterol (ProAir HFA) 90 mcg/actuation inhaler Inhale.      amitriptyline (Elavil) 50 mg tablet Take one tablet  daily at bedtime.      amoxicillin-pot clavulanate (Augmentin) 500-125 mg tablet Take by mouth.      azithromycin (Zithromax) 250 mg tablet Take by mouth.      buPROPion XL (Wellbutrin XL) 300 mg 24 hr tablet Take 1 tablet (300 mg) by mouth once daily. 90 tablet 3    butalbital-acetaminophen-caff (Fioricet) -40 mg capsule Take by mouth.      cetirizine (ZyrTEC) 10 mg tablet Take 1 tablet (10 mg) by mouth once daily.      cholecalciferol (Vitamin D3) 10 MCG (400 UNIT) tablet Take by mouth.      cholecalciferol, vitamin D3, 25 mcg (1,000 unit) tablet,chewable Chew 1 tablet (25 mcg) once daily.      cyclobenzaprine (Flexeril) 5 mg tablet Take 1 tablet (5 mg) by mouth 3 times a day as needed for muscle spasms. 30 tablet 0    cyclopentolate (Cyclogyl) 1 % ophthalmic solution Administer into affected eye(s) twice a day.      DICLOFENAC SODIUM ORAL Apply topically twice a day.      docusate sodium (Colace) 100 mg capsule Take 1 capsule (100 mg) by mouth twice a day.      doxycycline (Monodox) 100 mg capsule 1 capsule (100 mg).      esomeprazole (NexIUM) 40 mg DR capsule Take 1 capsule (40 mg) by mouth once daily.      ferrous fumarate-vitamin C (Jerrica-Sequeles 65-25) Take 1 tablet by mouth 3 times a day with meals. Do not crush, chew, or split.      ferrous sulfate, 325 mg ferrous sulfate, tablet Take 1 tablet by mouth once daily.      flaxseed oiL 1,000 mg capsule Take by mouth.      fluticasone (Flonase) 50 mcg/actuation nasal spray Administer into affected nostril(s).      folic acid (Folvite) 1 mg tablet Take 1 tablet (1 mg) by mouth once daily.      loratadine (Claritin) 10 mg tablet Take by mouth.      melatonin 3 mg tablet Take by mouth.      methylPREDNISolone (Medrol Dospak) 4 mg tablets Take by mouth.      mometasone (Nasonex) 50 mcg/actuation nasal spray Administer into affected nostril(s).      Mucus Relief  mg 12 hr tablet Take 1 tablet (600 mg) by mouth 2 times a day.      multivitamin (MULTIPLE  VITAMINS ORAL) Take by mouth once daily.      multivitamin tablet Take 1 tablet by mouth once daily.      naloxone (Narcan) 4 mg/0.1 mL nasal spray Administer 1 spray (4 mg) into affected nostril(s) if needed for opioid reversal. May repeat every 2-3 minutes if needed, alternating nostrils, until medical assistance becomes available. 2 each 0    naproxen sodium (Aleve) 220 mg tablet Take by mouth.      ofloxacin (Ocuflox) 0.3 % ophthalmic solution Administer into affected eye(s) 4 times a day.      omeprazole (PriLOSEC) 40 mg DR capsule Take 1 capsule (40 mg) by mouth once daily in the morning. Take before meals. 90 capsule 3    oxyCODONE (Roxicodone) 5 mg immediate release tablet Take 1 tablet (5 mg) by mouth every 6 hours if needed for severe pain (7 - 10). 60 tablet 0    oxyCODONE-acetaminophen (Percocet) 5-325 mg tablet Take by mouth.      polyethylene glycol (Glycolax, Miralax) 17 gram/dose powder Take by mouth.      potassium chloride CR (K-Tab) 20 mEq ER tablet Take 2 tablets every day 180 tablet 3    prednisoLONE acetate (Pred-Forte) 1 % ophthalmic suspension Administer into affected eye(s) 4 times a day.      prednisoLONE acetate (Pred-Forte) 1 % ophthalmic suspension Administer into affected eye(s) every 6 hours.      therapeutic multivitamin-iron-minerals (Theragran-M) tablet Take by mouth.      triamterene-hydrochlorothiazid (Maxzide) 75-50 mg tablet Take 1 tablet by mouth once daily. 90 tablet 3    verapamil (Calan) 120 mg tablet Take by mouth.      vitamin A 2,400 mcg capsule Take 1 capsule (2.4 mg) by mouth once daily.       No facility-administered encounter medications on file as of 4/29/2024.       Past Medical History:    has a past medical history of Acute embolism and thrombosis of superficial veins of left upper extremity (03/23/2019), Adjustment disorder with mixed anxiety and depressed mood (06/18/2012), Encounter for immunization (12/21/2020), Encounter for immunization (10/11/2019), Hb-SS  disease with crisis, unspecified (Multi) (02/08/2018), 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) (05/06/2013), Opioid use, unspecified with withdrawal (Multi) (05/06/2013), Other conditions influencing health status (06/18/2012), Other conditions influencing health status (12/04/2012), Personal history of diseases of the blood and blood-forming organs and certain disorders involving the immune mechanism, Personal history of diseases of the blood and blood-forming organs and certain disorders involving the immune mechanism (06/18/2012), Personal history of other diseases of the circulatory system, Personal history of other diseases of the musculoskeletal system and connective tissue, Personal history of other diseases of the respiratory system, Personal history of other endocrine, nutritional and metabolic disease, Personal history of other infectious and parasitic diseases (01/21/2014), Personal history of other malignant neoplasm of bronchus and lung (04/15/2018), Personal history of other medical treatment (10/11/2019), Personal history of other mental and behavioral disorders (04/14/2013), Personal history of other specified conditions, Personal history of other specified conditions, Personal history of other specified conditions (04/04/2013), and Puckering of macula, left eye (06/18/2015).    Past Surgical History:    has a past surgical history that includes Total hip arthroplasty (06/18/2012); Other surgical history (06/18/2012); Total hip arthroplasty (12/04/2012); Other surgical history (09/21/2017); Other surgical history (04/20/2015); Gallbladder surgery (09/28/2015); Total shoulder arthroplasty (07/24/2018); and Lung lobectomy (08/04/2012).    Family History:   No family history on file.    Social History:   Sonja Dallas  reports that she has been smoking cigarettes. She has never used smokeless tobacco.  reports current drug use. Drug: Marijuana.    EXAMINATION FINDINGS   LMP  (LMP  Unknown)   There is no height or weight on file to calculate BSA.    Physical Exam  HENT:      Head: Normocephalic.      Nose: Nose normal.      Mouth/Throat:      Mouth: Mucous membranes are moist.   Eyes:      Pupils: Pupils are equal, round, and reactive to light.   Cardiovascular:      Rate and Rhythm: Normal rate and regular rhythm.      Pulses: Normal pulses.      Heart sounds: Normal heart sounds.   Pulmonary:      Effort: Pulmonary effort is normal.      Breath sounds: Normal breath sounds.   Abdominal:      General: Bowel sounds are normal.      Palpations: Abdomen is soft.   Musculoskeletal:         General: Normal range of motion.   Skin:     General: Skin is warm and dry.   Neurological:      General: No focal deficit present.      Mental Status: She is alert and oriented to person, place, and time.   Psychiatric:         Mood and Affect: Mood normal.         Behavior: Behavior normal.       Not done since this was a virtual visit   LABS   Lab on 02/23/2024   Component Date Value Ref Range Status    WBC 02/23/2024 5.0  4.4 - 11.3 x10*3/uL Final    nRBC 02/23/2024 0.0  0.0 - 0.0 /100 WBCs Final    RBC 02/23/2024 3.80 (L)  4.00 - 5.20 x10*6/uL Final    Hemoglobin 02/23/2024 10.4 (L)  12.0 - 16.0 g/dL Final    Hematocrit 02/23/2024 31.1 (L)  36.0 - 46.0 % Final    MCV 02/23/2024 82  80 - 100 fL Final    MCH 02/23/2024 27.4  26.0 - 34.0 pg Final    MCHC 02/23/2024 33.4  32.0 - 36.0 g/dL Final    RDW 02/23/2024 14.7 (H)  11.5 - 14.5 % Final    Platelets 02/23/2024 210  150 - 450 x10*3/uL Final    Neutrophils % 02/23/2024 54.5  40.0 - 80.0 % Final    Immature Granulocytes %, Automated 02/23/2024 0.2  0.0 - 0.9 % Final    Immature Granulocyte Count (IG) includes promyelocytes, myelocytes and metamyelocytes but does not include bands. Percent differential counts (%) should be interpreted in the context of the absolute cell counts (cells/UL).    Lymphocytes % 02/23/2024 31.1  13.0 - 44.0 % Final    Monocytes %  02/23/2024 8.8  2.0 - 10.0 % Final    Eosinophils % 02/23/2024 4.8  0.0 - 6.0 % Final    Basophils % 02/23/2024 0.6  0.0 - 2.0 % Final    Neutrophils Absolute 02/23/2024 2.72  1.20 - 7.70 x10*3/uL Final    Percent differential counts (%) should be interpreted in the context of the absolute cell counts (cells/uL).    Immature Granulocytes Absolute, Au* 02/23/2024 0.01  0.00 - 0.70 x10*3/uL Final    Lymphocytes Absolute 02/23/2024 1.55  1.20 - 4.80 x10*3/uL Final    Monocytes Absolute 02/23/2024 0.44  0.10 - 1.00 x10*3/uL Final    Eosinophils Absolute 02/23/2024 0.24  0.00 - 0.70 x10*3/uL Final    Basophils Absolute 02/23/2024 0.03  0.00 - 0.10 x10*3/uL Final    Glucose 02/23/2024 115 (H)  74 - 99 mg/dL Final    Sodium 02/23/2024 141  136 - 145 mmol/L Final    Potassium 02/23/2024 3.6  3.5 - 5.3 mmol/L Final    Chloride 02/23/2024 101  98 - 107 mmol/L Final    Bicarbonate 02/23/2024 27  21 - 32 mmol/L Final    Anion Gap 02/23/2024 17  10 - 20 mmol/L Final    Urea Nitrogen 02/23/2024 9  6 - 23 mg/dL Final    Creatinine 02/23/2024 1.03  0.50 - 1.05 mg/dL Final    eGFR 02/23/2024 62  >60 mL/min/1.73m*2 Final    Calculations of estimated GFR are performed using the 2021 CKD-EPI Study Refit equation without the race variable for the IDMS-Traceable creatinine methods.  https://jasn.asnjournals.org/content/early/2021/09/22/ASN.0539031127    Calcium 02/23/2024 10.1  8.6 - 10.6 mg/dL Final    Albumin 02/23/2024 5.0  3.4 - 5.0 g/dL Final    Alkaline Phosphatase 02/23/2024 64  33 - 136 U/L Final    Total Protein 02/23/2024 6.9  6.4 - 8.2 g/dL Final    AST 02/23/2024 21  9 - 39 U/L Final    Bilirubin, Total 02/23/2024 0.9  0.0 - 1.2 mg/dL Final    ALT 02/23/2024 19  7 - 45 U/L Final    Patients treated with Sulfasalazine may generate falsely decreased results for ALT.    Retic % 02/23/2024 4.4 (H)  0.5 - 2.0 % Final    Retic Absolute 02/23/2024 0.169 (H)  0.018 - 0.083 x10*6/uL Final    Reticulocyte Hemoglobin 02/23/2024 29  28 -  "38 pg Final    Immature Retic fraction 02/23/2024 29.5 (H)  <=16.0 % Final    Reticulocytes are measured based on a fluorescent technique. The IRF, or immature reticulocyte fraction, is the percent of reticulocytes that show medium (MFR) or high (HFR) fluorescence.  This value can be used to assess the relative maturity of the reticulocyte population in response to anemia. The \"shift reticulocytes\" are not measured by this technique, eliminating the need for their correction in the reticulocyte index.    LDH 02/23/2024 157  84 - 246 U/L Final    Hemoglobin A 02/23/2024 17.4 (L)  95.8 - 98.0 % Final    Hemoglobin F 02/23/2024 4.2 (H)  0.0 - 2.0 % Final    Hemoglobin S 02/23/2024 72.2 (H)  <=0.0 % Final    Hemoglobin A2 02/23/2024 6.2 (H)  2.0 - 3.5 % Final    Hemoglobin Identification Interpre* 02/23/2024 See Below (A)  Normal Final    Pathologist Review-Hemoglobin Iden* 02/23/2024    Final                    Value:Loy Brice MD    Electronically signed out by Dixon Mondragon MD on 2/26/24 at 5:05 PM.  By the signature on this report, the individual or group listed as making the Final Interpretation/Diagnosis certifies that they have reviewed this case.    Ferritin 02/23/2024 84  8 - 150 ng/mL Final    ABO TYPE 02/23/2024 A   Final    Rh TYPE 02/23/2024 POS   Final    ANTIBODY SCREEN 02/23/2024 NEG   Final               ADELINA Beasley-CNP                         "

## 2024-05-16 ENCOUNTER — OFFICE VISIT (OUTPATIENT)
Dept: OPHTHALMOLOGY | Facility: CLINIC | Age: 64
End: 2024-05-16
Payer: COMMERCIAL

## 2024-05-16 DIAGNOSIS — D57.01: ICD-10-CM

## 2024-05-16 DIAGNOSIS — H36.812: ICD-10-CM

## 2024-05-16 DIAGNOSIS — H26.222 CATARACT OF LEFT EYE SECONDARY TO OCULAR DISORDER: ICD-10-CM

## 2024-05-16 DIAGNOSIS — D57.00 SICKLE CELL DISEASE WITH CRISIS (MULTI): Primary | ICD-10-CM

## 2024-05-16 DIAGNOSIS — H33.42 TRACTION DETACHMENT OF LEFT RETINA: ICD-10-CM

## 2024-05-16 DIAGNOSIS — H43.12 VITREOUS HEMORRHAGE OF LEFT EYE (MULTI): ICD-10-CM

## 2024-05-16 PROCEDURE — 99214 OFFICE O/P EST MOD 30 MIN: CPT | Performed by: OPHTHALMOLOGY

## 2024-05-16 PROCEDURE — 92134 CPTRZ OPH DX IMG PST SGM RTA: CPT | Mod: BILATERAL PROCEDURE | Performed by: OPHTHALMOLOGY

## 2024-05-16 ASSESSMENT — VISUAL ACUITY
OS_CC: 20/200
METHOD: SNELLEN - LINEAR
OD_CC: 20/40
OD_CC+: +2
OS_CC+: -2
CORRECTION_TYPE: GLASSES

## 2024-05-16 ASSESSMENT — ENCOUNTER SYMPTOMS
HEMATOLOGIC/LYMPHATIC NEGATIVE: 0
GASTROINTESTINAL NEGATIVE: 0
EYES NEGATIVE: 0
RESPIRATORY NEGATIVE: 0
CONSTITUTIONAL NEGATIVE: 0
ALLERGIC/IMMUNOLOGIC NEGATIVE: 0
PSYCHIATRIC NEGATIVE: 0
MUSCULOSKELETAL NEGATIVE: 0
CARDIOVASCULAR NEGATIVE: 0
ENDOCRINE NEGATIVE: 0
NEUROLOGICAL NEGATIVE: 0

## 2024-05-16 ASSESSMENT — EXTERNAL EXAM - LEFT EYE: OS_EXAM: NORMAL

## 2024-05-16 ASSESSMENT — CONF VISUAL FIELD
OD_INFERIOR_NASAL_RESTRICTION: 0
OD_SUPERIOR_TEMPORAL_RESTRICTION: 0
OS_SUPERIOR_NASAL_RESTRICTION: 0
OS_NORMAL: 1
OD_INFERIOR_TEMPORAL_RESTRICTION: 0
OS_SUPERIOR_TEMPORAL_RESTRICTION: 0
OS_INFERIOR_TEMPORAL_RESTRICTION: 0
OS_INFERIOR_NASAL_RESTRICTION: 0
OD_NORMAL: 1
OD_SUPERIOR_NASAL_RESTRICTION: 0

## 2024-05-16 ASSESSMENT — TONOMETRY
OD_IOP_MMHG: 14
IOP_METHOD: TONOPEN
OS_IOP_MMHG: 12

## 2024-05-16 ASSESSMENT — EXTERNAL EXAM - RIGHT EYE: OD_EXAM: NORMAL

## 2024-05-16 ASSESSMENT — CUP TO DISC RATIO
OD_RATIO: .1
OS_RATIO: .1

## 2024-05-16 NOTE — PROGRESS NOTES
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             Impression          1 H52.03 Hypermetropia of both eyes-Stable     2 H33.42 Retinal detachment, tractional, left eye-Resolved     3 H52.202 Astigmatism of left eye-Stable     4 H43.822 Vitreomacular traction syndrome of left eye-Improving     5 H52.4 Presbyopia-Stable     6 H43.12 Vitreous hemorrhage of left eye-Improving     7 H02.88A Meibomian gland dysfunction right eye, upper and lower eyelids-Stable     8 D57.1 Sickle cell retinopathy-Stable     9 H02.88B Meibomian gland dysfunction left eye, upper and lower eyelids-Stable     10 D57.1 Sickle cell anemia-Stable     11 H35.059 Neovascularization of retina-Improving     12 H52.03 Hypermetropia of both eyes-Stable     13 H52.4 Presbyopia-Stable        Discussion      1 Retinal detachment, tractional, left eye~H33.42 stable   2 Vitreous hemorrhage of left eye~H43.12   3 Sickle cell retinopathy~D57.1   4 Sickle cell anemia~D57.1   5 Neovascularization of retina~H35.059   6. Extensive ERM OS       Extensive proliferative retinopathy OU   Vitreous hemorrhage OS chronic reccommend anti VEGF    s/p PPV MP AIR       doing well 6m          Hi quality OCT  scans obtained   signal good      OCT OD - Normal Foveal Contour, No Edema, IS/OS Junction Normal   OCT OS - Normal Foveal Contour, No Edema, IS/OS Junction Normal      additional commnents:                Plan       See Dr ADELE HART posterior subcapsular cataract (PSC) left  Follow up 6 months    She may need DVT prophylaxis compression

## 2024-06-04 ENCOUNTER — OFFICE VISIT (OUTPATIENT)
Dept: GASTROENTEROLOGY | Facility: HOSPITAL | Age: 64
End: 2024-06-04
Payer: COMMERCIAL

## 2024-06-04 ENCOUNTER — OFFICE VISIT (OUTPATIENT)
Dept: OPHTHALMOLOGY | Facility: CLINIC | Age: 64
End: 2024-06-04
Payer: COMMERCIAL

## 2024-06-04 VITALS
OXYGEN SATURATION: 98 % | TEMPERATURE: 97.8 F | HEART RATE: 84 BPM | SYSTOLIC BLOOD PRESSURE: 103 MMHG | BODY MASS INDEX: 24.83 KG/M2 | HEIGHT: 65 IN | WEIGHT: 149 LBS | DIASTOLIC BLOOD PRESSURE: 70 MMHG

## 2024-06-04 DIAGNOSIS — K21.9 GASTROESOPHAGEAL REFLUX DISEASE WITHOUT ESOPHAGITIS: ICD-10-CM

## 2024-06-04 DIAGNOSIS — H26.222 CATARACT OF LEFT EYE SECONDARY TO OCULAR DISORDER: Primary | ICD-10-CM

## 2024-06-04 DIAGNOSIS — H18.513 CORNEAL GUTTATA OF BOTH EYES: ICD-10-CM

## 2024-06-04 DIAGNOSIS — D57.00 SICKLE CELL DISEASE WITH CRISIS (MULTI): ICD-10-CM

## 2024-06-04 DIAGNOSIS — K44.9 HIATAL HERNIA: Primary | ICD-10-CM

## 2024-06-04 PROCEDURE — 3074F SYST BP LT 130 MM HG: CPT | Performed by: NURSE PRACTITIONER

## 2024-06-04 PROCEDURE — 99204 OFFICE O/P NEW MOD 45 MIN: CPT | Performed by: NURSE PRACTITIONER

## 2024-06-04 PROCEDURE — 92012 INTRM OPH EXAM EST PATIENT: CPT | Performed by: OPHTHALMOLOGY

## 2024-06-04 PROCEDURE — 99214 OFFICE O/P EST MOD 30 MIN: CPT | Performed by: NURSE PRACTITIONER

## 2024-06-04 PROCEDURE — 3078F DIAST BP <80 MM HG: CPT | Performed by: NURSE PRACTITIONER

## 2024-06-04 ASSESSMENT — CONF VISUAL FIELD
OS_INFERIOR_NASAL_RESTRICTION: 0
OD_INFERIOR_NASAL_RESTRICTION: 0
OD_NORMAL: 1
OD_INFERIOR_TEMPORAL_RESTRICTION: 0
OS_NORMAL: 1
OS_INFERIOR_TEMPORAL_RESTRICTION: 0
OD_SUPERIOR_NASAL_RESTRICTION: 0
OS_SUPERIOR_TEMPORAL_RESTRICTION: 0
OS_SUPERIOR_NASAL_RESTRICTION: 0
OD_SUPERIOR_TEMPORAL_RESTRICTION: 0

## 2024-06-04 ASSESSMENT — CUP TO DISC RATIO
OS_RATIO: .1
OD_RATIO: .1

## 2024-06-04 ASSESSMENT — TONOMETRY
OS_IOP_MMHG: 13
IOP_METHOD: GOLDMANN APPLANATION
OD_IOP_MMHG: 14

## 2024-06-04 ASSESSMENT — ENCOUNTER SYMPTOMS
ALLERGIC/IMMUNOLOGIC NEGATIVE: 1
NEUROLOGICAL NEGATIVE: 1
MUSCULOSKELETAL NEGATIVE: 1
HEMATOLOGIC/LYMPHATIC NEGATIVE: 1
PSYCHIATRIC NEGATIVE: 1
RESPIRATORY NEGATIVE: 1
CONSTITUTIONAL NEGATIVE: 1
ENDOCRINE NEGATIVE: 1
CARDIOVASCULAR NEGATIVE: 1
NAUSEA: 1
EYES NEGATIVE: 1

## 2024-06-04 ASSESSMENT — VISUAL ACUITY
CORRECTION_TYPE: GLASSES
OS_CC: 20/200
METHOD: SNELLEN - LINEAR
OD_CC: 20/25+2

## 2024-06-04 ASSESSMENT — REFRACTION_MANIFEST
OD_ADD: +2.25
OS_SPHERE: +1.25
OS_ADD: +2.25
OS_AXIS: 180
OD_SPHERE: +2.00
OS_CYLINDER: -1.75

## 2024-06-04 ASSESSMENT — REFRACTION_WEARINGRX
OS_ADD: +2.25
OD_SPHERE: +2.00
OD_ADD: +2.25
OS_CYLINDER: -1.75
OS_AXIS: 180
OS_SPHERE: +1.25

## 2024-06-04 ASSESSMENT — EXTERNAL EXAM - RIGHT EYE: OD_EXAM: NORMAL

## 2024-06-04 ASSESSMENT — EXTERNAL EXAM - LEFT EYE: OS_EXAM: NORMAL

## 2024-06-04 ASSESSMENT — PAIN SCALES - GENERAL: PAINLEVEL: 2

## 2024-06-04 NOTE — PROGRESS NOTES
Sees Dr. Villsaenor for  Sickle cell disease with crisis (Multi)  Vitreous hemorrhage of left eye (Multi)  Traction detachment of left retina status post (s/p) pars plana vitrectomy (PPV) with membrane peel (MP) - 2022 with Dr. 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 Villasenor to Dr. Tinoco for cataract surgery left eye    On exam today: OS  20/200 vision  2+ corneal guttata  2+ NSC (h/o pars plana vitrectomy (PPV))    Recommend cataract surgery - patient agrees  Schedule visit with Dr. Tinoco.

## 2024-06-04 NOTE — PROGRESS NOTES
Subjective   Patient ID: Sonja Dallas is a 63 y.o. female who presents for No chief complaint on file..  HPI  63 year old female here for evaluation of hiatal hernia  Medical history includes sickle cell disease, bilateral avascular necrosis status post bilateral hip and shoulder replacement, depression, anxiety, chronic anemia, hypertension  History of cholecystectomy, shoulder and hips surgery, breast reduction, lumpectomy, PHU removed removed- cancer- no chemoradiation  9/24/2021 colonoscopy showed hemorrhoids otherwise normal  4/24/2024 normal CMP  H&H 10.0 and 30.1  4/16/2024 CT of the abdomen and pelvis with contrast showed a surgically absent gallbladder, large hiatal hernia and diverticulosis, borderline splenomegaly.    Left sided abdominal discomfort- Hernia ?  BM good  No blood or melena  Had EGD a long time ago 2004 in [facility]      Review of Systems   Constitutional: Negative.    HENT: Negative.     Eyes: Negative.    Respiratory: Negative.     Cardiovascular: Negative.    Gastrointestinal:  Positive for nausea.   Endocrine: Negative.    Genitourinary: Negative.    Musculoskeletal: Negative.    Skin: Negative.    Allergic/Immunologic: Negative.    Neurological: Negative.    Hematological: Negative.    Psychiatric/Behavioral: Negative.         Objective   Physical Exam  Constitutional:       Appearance: Normal appearance.   HENT:      Head: Normocephalic.      Nose: Nose normal.      Mouth/Throat:      Mouth: Mucous membranes are moist.   Eyes:      Pupils: Pupils are equal, round, and reactive to light.   Cardiovascular:      Rate and Rhythm: Normal rate and regular rhythm.      Pulses: Normal pulses.   Pulmonary:      Effort: Pulmonary effort is normal.      Breath sounds: Normal breath sounds.   Abdominal:      General: Bowel sounds are normal.      Palpations: Abdomen is soft.   Musculoskeletal:         General: Normal range of motion.      Cervical back: Normal range of motion and neck supple.    Skin:     General: Skin is warm and dry.   Neurological:      Mental Status: She is alert.   Psychiatric:         Mood and Affect: Mood normal.         Assessment/Plan        Large hiatal hernia- please continue the omeprazole for your reflux. You have lost weight and followed anti-reflux precautions and you have still been having reflux symptoms and epigastric tenderness. I would recommend a stool study to rule out H-pylori and I will refer you to see Dr. Kun Soler for an EGD and evaluation of hiatal hernia repair.    I will call you with your results and determine follow up    JENNIE Morley 06/04/24 12:56 PM

## 2024-06-04 NOTE — PATIENT INSTRUCTIONS
Large hiatal hernia- please continue the omeprazole for your reflux. You have lost weight and followed anti-reflux precautions and you have still been having reflux symptoms and epigastric tenderness. I would recommend a stool study to rule out H-pylori and I will refer you to see Dr. Kun Soler for an EGD and evaluation of hiatal hernia repair.    I will call you with your results and determine follow up

## 2024-06-06 ENCOUNTER — TELEPHONE (OUTPATIENT)
Dept: PRIMARY CARE | Facility: CLINIC | Age: 64
End: 2024-06-06
Payer: COMMERCIAL

## 2024-06-06 ENCOUNTER — TELEPHONE (OUTPATIENT)
Dept: HEMATOLOGY/ONCOLOGY | Facility: HOSPITAL | Age: 64
End: 2024-06-06
Payer: COMMERCIAL

## 2024-06-06 DIAGNOSIS — E87.6 HYPOKALEMIA: ICD-10-CM

## 2024-06-06 DIAGNOSIS — D57.44 SICKLE CELL DISEASE, TYPE S BETA-PLUS THALASSEMIA (MULTI): ICD-10-CM

## 2024-06-06 RX ORDER — OXYCODONE HYDROCHLORIDE 5 MG/1
5 TABLET ORAL EVERY 6 HOURS PRN
Qty: 60 TABLET | Refills: 0 | Status: SHIPPED | OUTPATIENT
Start: 2024-06-06

## 2024-06-06 RX ORDER — POTASSIUM CHLORIDE 20 MEQ/1
40 TABLET, EXTENDED RELEASE ORAL DAILY
Qty: 180 TABLET | Refills: 3 | Status: SHIPPED | OUTPATIENT
Start: 2024-06-06

## 2024-06-06 NOTE — TELEPHONE ENCOUNTER
Refill  request received for Oxycodone 5mg.  Preferred pharmacy is [facility] at [address] in [place].  Message sent to Sickle Cell team.

## 2024-06-07 ENCOUNTER — TELEPHONE (OUTPATIENT)
Dept: PRIMARY CARE | Facility: CLINIC | Age: 64
End: 2024-06-07
Payer: COMMERCIAL

## 2024-06-08 DIAGNOSIS — L73.2 HIDRADENITIS SUPPURATIVA: Primary | ICD-10-CM

## 2024-06-08 RX ORDER — DOXYCYCLINE 100 MG/1
100 CAPSULE ORAL 2 TIMES DAILY
Qty: 14 CAPSULE | Refills: 0 | Status: SHIPPED | OUTPATIENT
Start: 2024-06-08 | End: 2024-06-15

## 2024-06-11 ENCOUNTER — LAB (OUTPATIENT)
Dept: LAB | Facility: LAB | Age: 64
End: 2024-06-11
Payer: COMMERCIAL

## 2024-06-11 DIAGNOSIS — K21.9 GASTROESOPHAGEAL REFLUX DISEASE WITHOUT ESOPHAGITIS: ICD-10-CM

## 2024-06-11 DIAGNOSIS — K44.9 HIATAL HERNIA: ICD-10-CM

## 2024-06-11 PROCEDURE — 87449 NOS EACH ORGANISM AG IA: CPT

## 2024-06-13 LAB — H PYLORI AG STL QL IA: NEGATIVE

## 2024-06-20 ENCOUNTER — TELEPHONE (OUTPATIENT)
Dept: GASTROENTEROLOGY | Facility: HOSPITAL | Age: 64
End: 2024-06-20
Payer: COMMERCIAL

## 2024-06-27 ENCOUNTER — APPOINTMENT (OUTPATIENT)
Dept: SURGERY | Facility: CLINIC | Age: 64
End: 2024-06-27
Payer: COMMERCIAL

## 2024-06-27 VITALS
SYSTOLIC BLOOD PRESSURE: 115 MMHG | BODY MASS INDEX: 25.33 KG/M2 | TEMPERATURE: 98.3 F | HEIGHT: 64 IN | HEART RATE: 76 BPM | WEIGHT: 148.4 LBS | DIASTOLIC BLOOD PRESSURE: 71 MMHG

## 2024-06-27 DIAGNOSIS — K44.9 HIATAL HERNIA: ICD-10-CM

## 2024-06-27 ASSESSMENT — PAIN SCALES - GENERAL: PAINLEVEL: 0-NO PAIN

## 2024-06-27 NOTE — PROGRESS NOTES
"History Of Present Illness  Sonja Dallas is a 63 y.o. female presenting with worsening symptoms of reflux from her hiatal hernia.  She also has some pain with this.  She has had a known hiatal hernia for many years.  She has had reflux for many years also.  She presently takes a PPI for reflux.  She denies any dysphagia.  CT scan showed this very large hiatal hernia.  It is not significantly changed in size over the last several years with CT scans.  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  She is always had a slightly enlarged spleen.  She had a colonoscopy in the past.  Years ago she said she had an upper endoscopy but nothing recently.  She had a previous lobectomy at the [facility].  She has had hip replacements.  No abdominal operations she had a laparoscopic cholecystectomy.  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  She had lost weight in the past.  And is considered a nice stable weight presently.  Losing weight does not help with her reflux.  Her sickle cell thalassemia is also been very stable.  She is followed routinely by our hematology service.  Her hemoglobins been stable        Last Recorded Vitals  Blood pressure 115/71, pulse 76, temperature 36.8 °C (98.3 °F), height 1.626 m (5' 4\"), weight 67.3 kg (148 lb 6.4 oz).  Physical Examination  Awake and alert.  Normal respiration.  Some tenderness to deep palpation left upper abdomen.  Laparoscopy scars.      Relevant Results I was able to review all of her previous CT scans.  X-ray showed her pictures of her CT scan also.      Assessment/Plan patient with chronic reflux and increasing symptoms from her hiatal hernia.  Will obtain a barium swallow.  Also obtain a manometry so we can assess her antireflux operation in time arrival hernia repair.  Pending those we will decide if she needs further studies.  I reviewed the hiatal hernia booklet with her.  We discussed risks and benefits of surgery.  She would like to wait for at " least 1 year in her present job so she will have more time off to recover from the surgery.  That will be the beginning of next year.  Considering she has had this for a while there is no urgency for doing the surgery.  She will get these other 2 studies done and then follow-up with me later in the year for consideration of surgery next year.    Kun Soler MD FACS  Professor of Surgery  Lincoln Cardoza Chair in Surgical [place]  [employer] [facility] [employer]  [address] [place] [address], [id number]  Phone [phone]  email: [email or web address]

## 2024-06-27 NOTE — LETTER
"June 27, 2024     Myriam Martínez, APRN-CNP  [address]  Department Of Medicine-Gastroenterology  [facility] [address]    Patient: Sonja Dallas   YOB: 1960   Date of Visit: 6/27/2024       Dear Dr. Myriam Martínez, APRN-CNP:    Thank you for referring Sonja Dallas to me for evaluation. Below are my notes for this consultation.  If you have questions, please do not hesitate to call me. I look forward to following your patient along with you.       Sincerely,     Kun Soler MD      CC: No Recipients  ______________________________________________________________________________________    History Of Present Illness  Sonja Dallas is a 63 y.o. female presenting with worsening symptoms of reflux from her hiatal hernia.  She also has some pain with this.  She has had a known hiatal hernia for many years.  She has had reflux for many years also.  She presently takes a PPI for reflux.  She denies any dysphagia.  CT scan showed this very large hiatal hernia.  It is not significantly changed in size over the last several years with CT scans.  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  She is always had a slightly enlarged spleen.  She had a colonoscopy in the past.  Years ago she said she had an upper endoscopy but nothing recently.  She had a previous lobectomy at the [facility].  She has had hip replacements.  No abdominal operations she had a laparoscopic cholecystectomy.  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  She had lost weight in the past.  And is considered a nice stable weight presently.  Losing weight does not help with her reflux.  Her sickle cell thalassemia is also been very stable.  She is followed routinely by our hematology service.  Her hemoglobins been stable        Last Recorded Vitals  Blood pressure 115/71, pulse 76, temperature 36.8 °C (98.3 °F), height 1.626 m (5' 4\"), weight 67.3 kg (148 lb 6.4 oz).  Physical Examination  Awake and alert.  " Normal respiration.  Some tenderness to deep palpation left upper abdomen.  Laparoscopy scars.      Relevant Results I was able to review all of her previous CT scans.  X-ray showed her pictures of her CT scan also.      Assessment/Planpatient with chronic reflux and increasing symptoms from her hiatal hernia.  Will obtain a barium swallow.  Also obtain a manometry so we can assess her antireflux operation in time arrival hernia repair.  Pending those we will decide if she needs further studies.  I reviewed the hiatal hernia booklet with her.  We discussed risks and benefits of surgery.  She would like to wait for at least 1 year in her present job so she will have more time off to recover from the surgery.  That will be the beginning of next year.  Considering she has had this for a while there is no urgency for doing the surgery.  She will get these other 2 studies done and then follow-up with me later in the year for consideration of surgery next year.    Kun Soler MD FACS  Professor of Surgery  Lincoln Cardoza Chair in Surgical [place]  [facility] [employer]  [address] [place] [address], [address]  Phone [phone]  email: [email or web address]

## 2024-06-30 DIAGNOSIS — K21.9 GASTROESOPHAGEAL REFLUX DISEASE, UNSPECIFIED WHETHER ESOPHAGITIS PRESENT: ICD-10-CM

## 2024-07-01 ENCOUNTER — SOCIAL WORK (OUTPATIENT)
Dept: CASE MANAGEMENT | Facility: HOSPITAL | Age: 64
End: 2024-07-01
Payer: COMMERCIAL

## 2024-07-01 ENCOUNTER — NURSE TRIAGE (OUTPATIENT)
Dept: HEMATOLOGY/ONCOLOGY | Facility: HOSPITAL | Age: 64
End: 2024-07-01
Payer: COMMERCIAL

## 2024-07-01 RX ORDER — OMEPRAZOLE 40 MG/1
40 CAPSULE, DELAYED RELEASE ORAL EVERY MORNING
Qty: 90 CAPSULE | Refills: 3 | Status: SHIPPED | OUTPATIENT
Start: 2024-07-01

## 2024-07-01 NOTE — PROGRESS NOTES
Covering LSW received secure chat from team seeking confirmation transportation has been arranged for pt for appointment on 7/2/24 at 10am.  LSW scheduled a roundtrip for pt to be picked up from home address at 9:30 am. LSW called pt to confirm ride but received no answer and left voicemail.

## 2024-07-01 NOTE — TELEPHONE ENCOUNTER
Patient states pain 8/10 in right shoulder since this morning and would like an apt to be seen in acc tomorrow am and would like team to call her regarding pain as well. States pain medication helps some but is not controlling pain.    Additional Information   Commented on: Where is the pain? Is there more than one place where you're having pain?     Right shoulder    Protocols used: Pain

## 2024-07-01 NOTE — TELEPHONE ENCOUNTER
Patient added to ACC schedule at 10am tomorrow. Called patient to notify, left voicemail with callback number requesting callback to confirm.

## 2024-07-02 ENCOUNTER — HOSPITAL ENCOUNTER (INPATIENT)
Facility: HOSPITAL | Age: 64
LOS: 3 days | Discharge: HOME | End: 2024-07-07
Attending: PEDIATRICS | Admitting: PEDIATRICS
Payer: COMMERCIAL

## 2024-07-02 ENCOUNTER — OFFICE VISIT (OUTPATIENT)
Dept: HEMATOLOGY/ONCOLOGY | Facility: HOSPITAL | Age: 64
End: 2024-07-02
Payer: COMMERCIAL

## 2024-07-02 VITALS
TEMPERATURE: 96.8 F | DIASTOLIC BLOOD PRESSURE: 83 MMHG | HEART RATE: 85 BPM | RESPIRATION RATE: 18 BRPM | OXYGEN SATURATION: 100 % | BODY MASS INDEX: 25.81 KG/M2 | SYSTOLIC BLOOD PRESSURE: 110 MMHG | WEIGHT: 150.35 LBS

## 2024-07-02 DIAGNOSIS — D57.00 SICKLE CELL DISEASE WITH CRISIS (MULTI): Primary | ICD-10-CM

## 2024-07-02 DIAGNOSIS — D57.00 SICKLE CELL ANEMIA WITH PAIN (MULTI): ICD-10-CM

## 2024-07-02 DIAGNOSIS — D57.00 SICKLE CELL CRISIS (MULTI): Primary | ICD-10-CM

## 2024-07-02 DIAGNOSIS — D57.44 SICKLE CELL DISEASE, TYPE S BETA-PLUS THALASSEMIA (MULTI): ICD-10-CM

## 2024-07-02 LAB
ALBUMIN SERPL BCP-MCNC: 4.3 G/DL (ref 3.4–5)
ALP SERPL-CCNC: 71 U/L (ref 33–136)
ALT SERPL W P-5'-P-CCNC: 12 U/L (ref 7–45)
AMPHETAMINES UR QL SCN: ABNORMAL
ANION GAP SERPL CALC-SCNC: 14 MMOL/L (ref 10–20)
AST SERPL W P-5'-P-CCNC: 17 U/L (ref 9–39)
BARBITURATES UR QL SCN: ABNORMAL
BASOPHILS # BLD AUTO: 0.02 X10*3/UL (ref 0–0.1)
BASOPHILS NFR BLD AUTO: 0.3 %
BILIRUB SERPL-MCNC: 1.2 MG/DL (ref 0–1.2)
BUN SERPL-MCNC: 8 MG/DL (ref 6–23)
BZE UR QL SCN: ABNORMAL
CALCIUM SERPL-MCNC: 9.7 MG/DL (ref 8.6–10.3)
CANNABINOIDS UR QL SCN: ABNORMAL
CHLORIDE SERPL-SCNC: 98 MMOL/L (ref 98–107)
CO2 SERPL-SCNC: 29 MMOL/L (ref 21–32)
CREAT SERPL-MCNC: 0.97 MG/DL (ref 0.5–1.05)
CREAT UR-MCNC: 75.2 MG/DL (ref 20–320)
EGFRCR SERPLBLD CKD-EPI 2021: 67 ML/MIN/1.73M*2
EOSINOPHIL # BLD AUTO: 0.13 X10*3/UL (ref 0–0.7)
EOSINOPHIL NFR BLD AUTO: 2.1 %
ERYTHROCYTE [DISTWIDTH] IN BLOOD BY AUTOMATED COUNT: 14.3 % (ref 11.5–14.5)
GLUCOSE SERPL-MCNC: 111 MG/DL (ref 74–99)
HCT VFR BLD AUTO: 29.9 % (ref 36–46)
HGB BLD-MCNC: 10.4 G/DL (ref 12–16)
HGB RETIC QN: 30 PG (ref 28–38)
IMM GRANULOCYTES # BLD AUTO: 0.04 X10*3/UL (ref 0–0.7)
IMM GRANULOCYTES NFR BLD AUTO: 0.7 % (ref 0–0.9)
IMMATURE RETIC FRACTION: 34.4 %
LDH SERPL L TO P-CCNC: 165 U/L (ref 84–246)
LYMPHOCYTES # BLD AUTO: 0.93 X10*3/UL (ref 1.2–4.8)
LYMPHOCYTES NFR BLD AUTO: 15.2 %
MAGNESIUM SERPL-MCNC: 1.7 MG/DL (ref 1.6–2.4)
MCH RBC QN AUTO: 27.7 PG (ref 26–34)
MCHC RBC AUTO-ENTMCNC: 34.8 G/DL (ref 32–36)
MCV RBC AUTO: 80 FL (ref 80–100)
MONOCYTES # BLD AUTO: 0.48 X10*3/UL (ref 0.1–1)
MONOCYTES NFR BLD AUTO: 7.9 %
NEUTROPHILS # BLD AUTO: 4.5 X10*3/UL (ref 1.2–7.7)
NEUTROPHILS NFR BLD AUTO: 73.8 %
NRBC BLD-RTO: 0 /100 WBCS (ref 0–0)
PCP UR QL SCN: ABNORMAL
PLATELET # BLD AUTO: 180 X10*3/UL (ref 150–450)
POTASSIUM SERPL-SCNC: 2.7 MMOL/L (ref 3.5–5.3)
PROT SERPL-MCNC: 7 G/DL (ref 6.4–8.2)
RBC # BLD AUTO: 3.76 X10*6/UL (ref 4–5.2)
RETICS #: 0.17 X10*6/UL (ref 0.02–0.08)
RETICS/RBC NFR AUTO: 4.5 % (ref 0.5–2)
SODIUM SERPL-SCNC: 138 MMOL/L (ref 136–145)
WBC # BLD AUTO: 6.1 X10*3/UL (ref 4.4–11.3)

## 2024-07-02 PROCEDURE — 99223 1ST HOSP IP/OBS HIGH 75: CPT

## 2024-07-02 PROCEDURE — 2500000002 HC RX 250 W HCPCS SELF ADMINISTERED DRUGS (ALT 637 FOR MEDICARE OP, ALT 636 FOR OP/ED): Performed by: NURSE PRACTITIONER

## 2024-07-02 PROCEDURE — 80053 COMPREHEN METABOLIC PANEL: CPT

## 2024-07-02 PROCEDURE — 99215 OFFICE O/P EST HI 40 MIN: CPT | Performed by: NURSE PRACTITIONER

## 2024-07-02 PROCEDURE — 83615 LACTATE (LD) (LDH) ENZYME: CPT

## 2024-07-02 PROCEDURE — 80365 DRUG SCREENING OXYCODONE: CPT

## 2024-07-02 PROCEDURE — 80307 DRUG TEST PRSMV CHEM ANLYZR: CPT

## 2024-07-02 PROCEDURE — 99417 PROLNG OP E/M EACH 15 MIN: CPT | Performed by: NURSE PRACTITIONER

## 2024-07-02 PROCEDURE — 85045 AUTOMATED RETICULOCYTE COUNT: CPT

## 2024-07-02 PROCEDURE — 2500000002 HC RX 250 W HCPCS SELF ADMINISTERED DRUGS (ALT 637 FOR MEDICARE OP, ALT 636 FOR OP/ED)

## 2024-07-02 PROCEDURE — 2500000001 HC RX 250 WO HCPCS SELF ADMINISTERED DRUGS (ALT 637 FOR MEDICARE OP): Performed by: NURSE PRACTITIONER

## 2024-07-02 PROCEDURE — 80349 CANNABINOIDS NATURAL: CPT

## 2024-07-02 PROCEDURE — 85025 COMPLETE CBC W/AUTO DIFF WBC: CPT

## 2024-07-02 PROCEDURE — 83021 HEMOGLOBIN CHROMOTOGRAPHY: CPT

## 2024-07-02 PROCEDURE — G0378 HOSPITAL OBSERVATION PER HR: HCPCS

## 2024-07-02 PROCEDURE — 83735 ASSAY OF MAGNESIUM: CPT

## 2024-07-02 PROCEDURE — 2500000005 HC RX 250 GENERAL PHARMACY W/O HCPCS: Performed by: NURSE PRACTITIONER

## 2024-07-02 PROCEDURE — 2500000001 HC RX 250 WO HCPCS SELF ADMINISTERED DRUGS (ALT 637 FOR MEDICARE OP)

## 2024-07-02 PROCEDURE — 96372 THER/PROPH/DIAG INJ SC/IM: CPT

## 2024-07-02 PROCEDURE — 2500000004 HC RX 250 GENERAL PHARMACY W/ HCPCS (ALT 636 FOR OP/ED)

## 2024-07-02 PROCEDURE — 2500000004 HC RX 250 GENERAL PHARMACY W/ HCPCS (ALT 636 FOR OP/ED): Performed by: NURSE PRACTITIONER

## 2024-07-02 RX ORDER — NALOXONE HYDROCHLORIDE 0.4 MG/ML
0.4 INJECTION, SOLUTION INTRAMUSCULAR; INTRAVENOUS; SUBCUTANEOUS
OUTPATIENT
Start: 2024-07-02

## 2024-07-02 RX ORDER — BUPROPION HYDROCHLORIDE 300 MG/1
300 TABLET ORAL DAILY
Status: DISCONTINUED | OUTPATIENT
Start: 2024-07-02 | End: 2024-07-07 | Stop reason: HOSPADM

## 2024-07-02 RX ORDER — FERROUS SULFATE 325(65) MG
1 TABLET ORAL DAILY
Status: DISCONTINUED | OUTPATIENT
Start: 2024-07-02 | End: 2024-07-07 | Stop reason: HOSPADM

## 2024-07-02 RX ORDER — POTASSIUM CHLORIDE 750 MG/1
40 TABLET, FILM COATED, EXTENDED RELEASE ORAL ONCE
Status: COMPLETED | OUTPATIENT
Start: 2024-07-02 | End: 2024-07-02

## 2024-07-02 RX ORDER — BISMUTH SUBSALICYLATE 262 MG
1 TABLET,CHEWABLE ORAL
Status: DISCONTINUED | OUTPATIENT
Start: 2024-07-02 | End: 2024-07-02

## 2024-07-02 RX ORDER — DIPHENHYDRAMINE HCL 25 MG
25 CAPSULE ORAL EVERY 6 HOURS PRN
Status: DISCONTINUED | OUTPATIENT
Start: 2024-07-02 | End: 2024-07-07 | Stop reason: HOSPADM

## 2024-07-02 RX ORDER — MULTIVIT-MIN/IRON FUM/FOLIC AC 7.5 MG-4
1 TABLET ORAL DAILY
Status: DISCONTINUED | OUTPATIENT
Start: 2024-07-02 | End: 2024-07-07 | Stop reason: HOSPADM

## 2024-07-02 RX ORDER — MAGNESIUM SULFATE HEPTAHYDRATE 40 MG/ML
2 INJECTION, SOLUTION INTRAVENOUS ONCE
Status: COMPLETED | OUTPATIENT
Start: 2024-07-02 | End: 2024-07-02

## 2024-07-02 RX ORDER — TRIAMTERENE AND HYDROCHLOROTHIAZIDE 75; 50 MG/1; MG/1
1 TABLET ORAL DAILY
Status: DISCONTINUED | OUTPATIENT
Start: 2024-07-02 | End: 2024-07-07 | Stop reason: HOSPADM

## 2024-07-02 RX ORDER — NALOXONE HYDROCHLORIDE 0.4 MG/ML
0.2 INJECTION, SOLUTION INTRAMUSCULAR; INTRAVENOUS; SUBCUTANEOUS EVERY 5 MIN PRN
Status: DISCONTINUED | OUTPATIENT
Start: 2024-07-02 | End: 2024-07-07 | Stop reason: HOSPADM

## 2024-07-02 RX ORDER — HYDROMORPHONE HYDROCHLORIDE 1 MG/ML
0.4 INJECTION, SOLUTION INTRAMUSCULAR; INTRAVENOUS; SUBCUTANEOUS ONCE
Status: DISCONTINUED | OUTPATIENT
Start: 2024-07-02 | End: 2024-07-07 | Stop reason: HOSPADM

## 2024-07-02 RX ORDER — ONDANSETRON 4 MG/1
4 TABLET, FILM COATED ORAL EVERY 8 HOURS PRN
Status: DISCONTINUED | OUTPATIENT
Start: 2024-07-02 | End: 2024-07-07 | Stop reason: HOSPADM

## 2024-07-02 RX ORDER — POTASSIUM CHLORIDE 20 MEQ/1
40 TABLET, EXTENDED RELEASE ORAL DAILY
Status: DISCONTINUED | OUTPATIENT
Start: 2024-07-02 | End: 2024-07-07 | Stop reason: HOSPADM

## 2024-07-02 RX ORDER — DOCUSATE SODIUM 100 MG/1
100 CAPSULE, LIQUID FILLED ORAL 2 TIMES DAILY PRN
Status: DISCONTINUED | OUTPATIENT
Start: 2024-07-02 | End: 2024-07-07 | Stop reason: HOSPADM

## 2024-07-02 RX ORDER — CYCLOBENZAPRINE HCL 10 MG
5 TABLET ORAL ONCE
Status: COMPLETED | OUTPATIENT
Start: 2024-07-02 | End: 2024-07-02

## 2024-07-02 RX ORDER — CHOLECALCIFEROL (VITAMIN D3) 25 MCG
2000 TABLET ORAL DAILY
Status: DISCONTINUED | OUTPATIENT
Start: 2024-07-02 | End: 2024-07-07 | Stop reason: HOSPADM

## 2024-07-02 RX ORDER — PANTOPRAZOLE SODIUM 40 MG/1
40 TABLET, DELAYED RELEASE ORAL
Status: DISCONTINUED | OUTPATIENT
Start: 2024-07-03 | End: 2024-07-07 | Stop reason: HOSPADM

## 2024-07-02 RX ORDER — HYDROMORPHONE HYDROCHLORIDE 1 MG/ML
0.4 INJECTION, SOLUTION INTRAMUSCULAR; INTRAVENOUS; SUBCUTANEOUS EVERY 2 HOUR PRN
Status: DISCONTINUED | OUTPATIENT
Start: 2024-07-02 | End: 2024-07-02

## 2024-07-02 RX ORDER — NALOXONE HYDROCHLORIDE 0.4 MG/ML
0.4 INJECTION, SOLUTION INTRAMUSCULAR; INTRAVENOUS; SUBCUTANEOUS
Status: DISCONTINUED | OUTPATIENT
Start: 2024-07-02 | End: 2024-07-02 | Stop reason: HOSPADM

## 2024-07-02 RX ORDER — ENOXAPARIN SODIUM 100 MG/ML
40 INJECTION SUBCUTANEOUS EVERY 24 HOURS
Status: DISCONTINUED | OUTPATIENT
Start: 2024-07-02 | End: 2024-07-07 | Stop reason: HOSPADM

## 2024-07-02 RX ORDER — HYDROMORPHONE HYDROCHLORIDE 1 MG/ML
0.6 INJECTION, SOLUTION INTRAMUSCULAR; INTRAVENOUS; SUBCUTANEOUS EVERY 2 HOUR PRN
Status: DISCONTINUED | OUTPATIENT
Start: 2024-07-02 | End: 2024-07-05

## 2024-07-02 RX ORDER — POLYETHYLENE GLYCOL 3350 17 G/17G
17 POWDER, FOR SOLUTION ORAL 2 TIMES DAILY PRN
Status: DISCONTINUED | OUTPATIENT
Start: 2024-07-02 | End: 2024-07-07 | Stop reason: HOSPADM

## 2024-07-02 RX ORDER — FOLIC ACID 1 MG/1
1 TABLET ORAL DAILY
Status: DISCONTINUED | OUTPATIENT
Start: 2024-07-02 | End: 2024-07-03

## 2024-07-02 RX ORDER — DOXYCYCLINE HYCLATE 100 MG
100 TABLET ORAL EVERY 12 HOURS SCHEDULED
Status: DISCONTINUED | OUTPATIENT
Start: 2024-07-02 | End: 2024-07-07 | Stop reason: HOSPADM

## 2024-07-02 RX ORDER — ONDANSETRON HYDROCHLORIDE 8 MG/1
8 TABLET, FILM COATED ORAL ONCE
Status: COMPLETED | OUTPATIENT
Start: 2024-07-02 | End: 2024-07-02

## 2024-07-02 RX ORDER — DIPHENHYDRAMINE HCL 25 MG
25 CAPSULE ORAL ONCE
Status: COMPLETED | OUTPATIENT
Start: 2024-07-02 | End: 2024-07-02

## 2024-07-02 RX ORDER — BUTALBITAL, ACETAMINOPHEN AND CAFFEINE 300; 40; 50 MG/1; MG/1; MG/1
1 CAPSULE ORAL EVERY 6 HOURS PRN
Status: DISCONTINUED | OUTPATIENT
Start: 2024-07-02 | End: 2024-07-02

## 2024-07-02 SDOH — SOCIAL STABILITY: SOCIAL INSECURITY: DO YOU FEEL UNSAFE GOING BACK TO THE PLACE WHERE YOU ARE LIVING?: NO

## 2024-07-02 SDOH — SOCIAL STABILITY: SOCIAL INSECURITY: ARE THERE ANY APPARENT SIGNS OF INJURIES/BEHAVIORS THAT COULD BE RELATED TO ABUSE/NEGLECT?: NO

## 2024-07-02 SDOH — SOCIAL STABILITY: SOCIAL INSECURITY: HAVE YOU HAD ANY THOUGHTS OF HARMING ANYONE ELSE?: NO

## 2024-07-02 SDOH — SOCIAL STABILITY: SOCIAL INSECURITY: DOES ANYONE TRY TO KEEP YOU FROM HAVING/CONTACTING OTHER FRIENDS OR DOING THINGS OUTSIDE YOUR HOME?: NO

## 2024-07-02 SDOH — SOCIAL STABILITY: SOCIAL INSECURITY: DO YOU FEEL ANYONE HAS EXPLOITED OR TAKEN ADVANTAGE OF YOU FINANCIALLY OR OF YOUR PERSONAL PROPERTY?: NO

## 2024-07-02 SDOH — SOCIAL STABILITY: SOCIAL INSECURITY: ARE YOU OR HAVE YOU BEEN THREATENED OR ABUSED PHYSICALLY, EMOTIONALLY, OR SEXUALLY BY ANYONE?: NO

## 2024-07-02 SDOH — SOCIAL STABILITY: SOCIAL INSECURITY: HAVE YOU HAD THOUGHTS OF HARMING ANYONE ELSE?: YES

## 2024-07-02 SDOH — SOCIAL STABILITY: SOCIAL INSECURITY: ABUSE: ADULT

## 2024-07-02 SDOH — SOCIAL STABILITY: SOCIAL INSECURITY: HAS ANYONE EVER THREATENED TO HURT YOUR FAMILY OR YOUR PETS?: NO

## 2024-07-02 ASSESSMENT — PAIN DESCRIPTION - LOCATION
LOCATION: SHOULDER

## 2024-07-02 ASSESSMENT — PATIENT HEALTH QUESTIONNAIRE - PHQ9
1. LITTLE INTEREST OR PLEASURE IN DOING THINGS: NOT AT ALL
2. FEELING DOWN, DEPRESSED OR HOPELESS: NOT AT ALL
SUM OF ALL RESPONSES TO PHQ9 QUESTIONS 1 & 2: 0

## 2024-07-02 ASSESSMENT — PAIN SCALES - GENERAL
PAINLEVEL_OUTOF10: 7
PAINLEVEL_OUTOF10: 9
PAINLEVEL_OUTOF10: 10 - WORST POSSIBLE PAIN
PAINLEVEL_OUTOF10: 9
PAINLEVEL: 10-WORST PAIN EVER
PAINLEVEL_OUTOF10: 9

## 2024-07-02 ASSESSMENT — ENCOUNTER SYMPTOMS
SHORTNESS OF BREATH: 0
NAUSEA: 0
SORE THROAT: 0
RHINORRHEA: 0
DIARRHEA: 0
DYSURIA: 0
COUGH: 0
DIZZINESS: 0
APPETITE CHANGE: 0
WEAKNESS: 0
HEADACHES: 0
ABDOMINAL PAIN: 0
LIGHT-HEADEDNESS: 0
CHILLS: 0
VOMITING: 0
PALPITATIONS: 0
FEVER: 0
CONSTIPATION: 0

## 2024-07-02 ASSESSMENT — PAIN - FUNCTIONAL ASSESSMENT
PAIN_FUNCTIONAL_ASSESSMENT: 0-10

## 2024-07-02 ASSESSMENT — ACTIVITIES OF DAILY LIVING (ADL)
HEARING - LEFT EAR: FUNCTIONAL
FEEDING YOURSELF: INDEPENDENT
WALKS IN HOME: INDEPENDENT
DRESSING YOURSELF: INDEPENDENT
GROOMING: INDEPENDENT
TOILETING: INDEPENDENT
BATHING: INDEPENDENT
PATIENT'S MEMORY ADEQUATE TO SAFELY COMPLETE DAILY ACTIVITIES?: YES
ADEQUATE_TO_COMPLETE_ADL: YES
LACK_OF_TRANSPORTATION: NO
JUDGMENT_ADEQUATE_SAFELY_COMPLETE_DAILY_ACTIVITIES: YES
HEARING - RIGHT EAR: FUNCTIONAL

## 2024-07-02 ASSESSMENT — COGNITIVE AND FUNCTIONAL STATUS - GENERAL
MOBILITY SCORE: 24
PATIENT BASELINE BEDBOUND: NO
DAILY ACTIVITIY SCORE: 24
PATIENT BASELINE BEDBOUND: NO
DAILY ACTIVITIY SCORE: 24

## 2024-07-02 ASSESSMENT — PAIN DESCRIPTION - ORIENTATION
ORIENTATION: RIGHT

## 2024-07-02 ASSESSMENT — COLUMBIA-SUICIDE SEVERITY RATING SCALE - C-SSRS
6. HAVE YOU EVER DONE ANYTHING, STARTED TO DO ANYTHING, OR PREPARED TO DO ANYTHING TO END YOUR LIFE?: NO
1. IN THE PAST MONTH, HAVE YOU WISHED YOU WERE DEAD OR WISHED YOU COULD GO TO SLEEP AND NOT WAKE UP?: NO
2. HAVE YOU ACTUALLY HAD ANY THOUGHTS OF KILLING YOURSELF?: NO

## 2024-07-02 NOTE — LETTER
Date: 2024  RE:  Sonja Dallas  :  1960      To Whom It May Concern:    Our patient, Sonja, has been under our care and now may return back to work without restrictions.    Their return to work date is: 7/10/24.    If you have questions concerning this patient's immediate care, please feel free to contact our office at [phone].    Sincerely,      Kyara Jaquez, ADELINA-CNP

## 2024-07-02 NOTE — H&P
History Of Present Illness  Sonja Dallas is a 63 y.o. 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 Chamorro), HTN, TTP (2004), who presented to [facility] 7/2 for uncontrolled pain in R shoulder. She was taking her home oxycodone without relief. She denies any injury to the site. Pain has been waxing and waning since ACC. Denies N/V/D/C, chest pain, cough, shortness of breath.  Remainder of ROS and physical exam as below.     ACC Course:  - 40 mEQ po potassium - patient reports she did not take her home po potassium the last day or 2  - dilaudid 0.4mg SQ x 3 doses given for sickle cell related pain  - Flexeril 5 mg, Zofran 8 mg once, Benadryl 25 mg once  - pain still poorly controlled despite above interventions, patient would like to be admitted   - additional 0.4 mg SubQ dilaudid while she was waiting for a [facility] bed        Past Medical History  She has a past medical history of Acute embolism and thrombosis of superficial veins of left upper extremity (03/23/2019), Adjustment disorder with mixed anxiety and depressed mood (06/18/2012), Encounter for immunization (12/21/2020), Encounter for immunization (10/11/2019), Hb-SS disease with crisis, unspecified (Multi) (02/08/2018), 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) (05/06/2013), Opioid use, unspecified with withdrawal (Multi) (05/06/2013), Other conditions influencing health status (06/18/2012), Other conditions influencing health status (12/04/2012), Personal history of diseases of the blood and blood-forming organs and certain disorders involving the immune mechanism, Personal history of diseases of the blood and blood-forming organs and certain disorders involving the immune mechanism (06/18/2012), Personal history of other diseases of the circulatory system, Personal history of other diseases of  the musculoskeletal system and connective tissue, Personal history of other diseases of the respiratory system, Personal history of other endocrine, nutritional and metabolic disease, Personal history of other infectious and parasitic diseases (01/21/2014), Personal history of other malignant neoplasm of bronchus and lung (04/15/2018), Personal history of other medical treatment (10/11/2019), Personal history of other mental and behavioral disorders (04/14/2013), Personal history of other specified conditions, Personal history of other specified conditions, Personal history of other specified conditions (04/04/2013), and Puckering of macula, left eye (06/18/2015).    Surgical History  She has a past surgical history that includes Total hip arthroplasty (06/18/2012); Other surgical history (06/18/2012); Total hip arthroplasty (12/04/2012); Other surgical history (09/21/2017); Other surgical history (04/20/2015); Gallbladder surgery (09/28/2015); Total shoulder arthroplasty (07/24/2018); and Lung lobectomy (08/04/2012).     Social History  She reports that she has been smoking cigarettes. She has been exposed to tobacco smoke. She has never used smokeless tobacco. She reports current alcohol use. She reports current drug use. Drug: Marijuana.     Allergies  Gabapentin; Gloves, latex with aloe vera; Latex; Morphine; and Naproxen    Current Outpatient Medications   Medication Instructions    buPROPion XL (WELLBUTRIN XL) 300 mg, oral, Daily    butalbital-acetaminophen-caff (Fioricet) -40 mg capsule oral    cholecalciferol (Vitamin D3) 10 MCG (400 UNIT) tablet oral    doxycycline (MONODOX) 100 mg    ferrous sulfate, 325 mg ferrous sulfate, tablet 1 tablet, oral, Daily    flaxseed oiL 1,000 mg capsule oral    folic acid (Folvite) 1 mg tablet 1 tablet, oral, Daily    multivitamin (MULTIPLE VITAMINS ORAL) oral, Daily RT    naloxone (NARCAN) 4 mg, nasal, As needed, May repeat every 2-3 minutes if needed, alternating  nostrils, until medical assistance becomes available.    omeprazole (PRILOSEC) 40 mg, oral, Every morning    oxyCODONE (ROXICODONE) 5 mg, oral, Every 6 hours PRN    potassium chloride CR 20 mEq ER tablet 40 mEq, oral, Daily    therapeutic multivitamin-iron-minerals (Theragran-M) tablet oral    triamterene-hydrochlorothiazid (Maxzide) 75-50 mg tablet 1 tablet, oral, Daily    vitamin A 8,000 Units, oral, Daily        Review of Systems   Constitutional:  Negative for appetite change, chills and fever.   HENT:  Negative for congestion, rhinorrhea and sore throat.    Respiratory:  Negative for cough and shortness of breath.    Cardiovascular:  Negative for chest pain and palpitations.   Gastrointestinal:  Negative for abdominal pain, constipation, diarrhea, nausea and vomiting.   Genitourinary:  Negative for dysuria.   Skin:  Negative for rash.   Neurological:  Negative for dizziness, weakness, light-headedness and headaches.        Physical Exam  Constitutional:       General: She is not in acute distress.     Appearance: She is not toxic-appearing.   HENT:      Head: Normocephalic and atraumatic.   Eyes:      General: No scleral icterus.     Extraocular Movements: Extraocular movements intact.   Cardiovascular:      Rate and Rhythm: Normal rate and regular rhythm.   Pulmonary:      Effort: No respiratory distress.   Abdominal:      General: Abdomen is flat.      Palpations: Abdomen is soft.      Tenderness: There is no abdominal tenderness.   Musculoskeletal:         General: No swelling or tenderness.   Skin:     Coloration: Skin is not jaundiced.      Findings: No bruising or erythema.   Neurological:      Mental Status: She is oriented to person, place, and time. Mental status is at baseline.          Last Recorded Vitals  There were no vitals taken for this visit.       Assessment/Plan   Active Problems:  There are no active Hospital Problems.      Sonja Dallas is a 63 y.o. 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 Chamorro), HTN, TTP (2004), who presented to [facility] 7/2 for uncontrolled pain in R shoulder. She was taking her home oxycodone without relief. Received 0.4mg subcutaneous dilaudid x 3 doses without adequate pain control, admitted for further management. Started on 0.4mg IVP dilaudid q2h.     # Sickle Cell Disease with Pain  - OARRS reviewed, last filled oxycodone 5mg #60 tabs on 6/6/24   - No carepath   - Hgb baseline ~10; 10.4 on admit, no need for simple transfusion    - Lysis labs at baseline   - no indication for CXR at time of admit, monitor for CP or resp symptoms / signs of infection   - Started on dilaudid 0.4mg IVP q2h on admit for pain management  - Hgb S pending (7/2)  - Utox pending (7/2)  - Continue home folic acid, iron, vitamin D, MV daily   - continue home fioricet for headaches PRN   - Bowel regimen for opioid induced constipation with DocuSenna tabs BID and Miralax daily PRN  - PO Benadryl PRN for opioid-induced pruritus  - PO Zofran PRN for opioid-induced nausea    # Chronic Hypokalemia  - only daily Kcl supplementation at home   - K 2.7 in ACC, s/p repletion; patient admits to missing 1-2 days doses  - continue home 20mEq Kcl daily  - dialy CMP and replete additional PRN     # Anxiety / Depression  - continue home wellbutrin 300mg daily     # HTN  - continue home maxzide 75-50mg tab       DVT prophy: Lovenox SQ, SCDs, encourage ambulation    DISPO:  - Full Code  - DC pending improvement in pain  - FUV: Esophagram and Ophthal 7/15, Sickle Cell 7/29        I spent 120 minutes in the professional and overall care of this patient.      Aria Rodríguez, APRN-CNP

## 2024-07-02 NOTE — PROGRESS NOTES
Patient ID:  Sonja Dallas is a 63 y.o. female.  Subjective   Chief Complaint: Uncontrolled Pain    HPI  Sonja is a 63 y.o. female with Hb S beta plus thalassemia presents to ACC with uncontrolled pain in right shoulder that started yesterday am and is consistent with her sickle cell pain. She tried taking her home oxycodone without significant relief. Pt denies chest pain, cough, SOB, headaches, blurry vision, falls, fever or chills, n/v/d/abd pain, or urinary complaints.        ROS  Review of Systems - Oncology     Allergies  Allergies   Allergen Reactions    Gabapentin Other     Abdominal pain    Gloves, Latex With Aloe Vera Swelling     Latex Gloves MISC    Latex Swelling     gloves    Morphine Unknown     itching    Naproxen Unknown        Medications  Current Outpatient Medications   Medication Instructions    buPROPion XL (WELLBUTRIN XL) 300 mg, oral, Daily    butalbital-acetaminophen-caff (Fioricet) -40 mg capsule oral    cholecalciferol (Vitamin D3) 10 MCG (400 UNIT) tablet oral    doxycycline (MONODOX) 100 mg    ferrous sulfate, 325 mg ferrous sulfate, tablet 1 tablet, oral, Daily    flaxseed oiL 1,000 mg capsule oral    folic acid (Folvite) 1 mg tablet 1 tablet, oral, Daily    multivitamin (MULTIPLE VITAMINS ORAL) oral, Daily RT    naloxone (NARCAN) 4 mg, nasal, As needed, May repeat every 2-3 minutes if needed, alternating nostrils, until medical assistance becomes available.    omeprazole (PRILOSEC) 40 mg, oral, Every morning    oxyCODONE (ROXICODONE) 5 mg, oral, Every 6 hours PRN    potassium chloride CR 20 mEq ER tablet 40 mEq, oral, Daily    therapeutic multivitamin-iron-minerals (Theragran-M) tablet oral    triamterene-hydrochlorothiazid (Maxzide) 75-50 mg tablet 1 tablet, oral, Daily    vitamin A 8,000 Units, oral, Daily        Past Medical History:   Past Medical History:  03/23/2019: Acute embolism and thrombosis of superficial veins of   left upper extremity      Comment:  Acute  thrombosis of left basilic vein  06/18/2012: Adjustment disorder with mixed anxiety and depressed mood      Comment:  Adjustment disorder with mixed anxiety and depressed                mood  12/21/2020: Encounter for immunization      Comment:  Need for Td vaccine  10/11/2019: Encounter for immunization      Comment:  Need for influenza vaccination  02/08/2018: Hb-SS disease with crisis, unspecified (Multi)      Comment:  Sickle cell pain crisis  05/06/2013: 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)      Comment:  Aseptic necrosis  05/06/2013: Opioid use, unspecified with withdrawal (Multi)      Comment:  Opioid withdrawal  06/18/2012: Other conditions influencing health status      Comment:  Benzodiazepine Dependence - Episodic  12/04/2012: Other conditions influencing health status      Comment:  Urinary Tract Infection  No date: Personal history of diseases of the blood and blood-forming   organs and certain disorders involving the immune mechanism      Comment:  History of sickle cell trait  06/18/2012: Personal history of diseases of the blood and blood-  forming organs and certain disorders involving the immune mechanism      Comment:  History of thrombotic thrombocytopenic purpura  No date: Personal history of other diseases of the circulatory system      Comment:  History of hypertension  No date: Personal history of other diseases of the musculoskeletal   system and connective tissue      Comment:  History of arthritis  No date: Personal history of other diseases of the respiratory system      Comment:  History of asthma  No date: Personal history of other endocrine, nutritional and   metabolic disease      Comment:  History of obesity  01/21/2014: Personal history of other infectious and parasitic   diseases      Comment:  Personal history of Helicobacter infection  04/15/2018: Personal history of other malignant neoplasm of bronchus   and lung      Comment:  History of malignant neoplasm of  lung  10/11/2019: Personal history of other medical treatment      Comment:  History of screening mammography  04/14/2013: Personal history of other mental and behavioral disorders      Comment:  History of depression  No date: Personal history of other specified conditions      Comment:  History of heartburn  No date: Personal history of other specified conditions      Comment:  History of ulceration  04/04/2013: Personal history of other specified conditions      Comment:  History of abdominal pain  06/18/2015: Puckering of macula, left eye      Comment:  ERM OS (epiretinal membrane, left eye)   Surgical History:    Past Surgical History:   Procedure Laterality Date    GALLBLADDER SURGERY  09/28/2015    Gallbladder Surgery    LUNG LOBECTOMY  08/04/2012    Lung Lobectomy    OTHER SURGICAL HISTORY  06/18/2012    Reported Hx Of Shoulder Joint Replacement    OTHER SURGICAL HISTORY  09/21/2017    Lung Lobectomy Partial    OTHER SURGICAL HISTORY  04/20/2015    Cervical Conization    TOTAL HIP ARTHROPLASTY  06/18/2012    Total Hip Replacement    TOTAL HIP ARTHROPLASTY  12/04/2012    Hip Replacement    TOTAL SHOULDER ARTHROPLASTY  07/24/2018    Shoulder Arthroplasty Total Shoulder Replacement      Family History:    No family history on file.  Family Oncology History:    Cancer-related family history is not on file.  Social History:    Social History     Tobacco Use    Smoking status: Some Days     Types: Cigarettes     Passive exposure: Past    Smokeless tobacco: Never   Vaping Use    Vaping status: Every Day    Substances: THC, marijuana    Devices: Disposable   Substance Use Topics    Alcohol use: Yes     Comment: Occasionally    Drug use: Yes     Types: Marijuana        Objective   Vitals: /83 (BP Location: Left arm, Patient Position: Sitting)   Pulse 85   Temp 36 °C (96.8 °F) (Skin)   Resp 18   Wt 68.2 kg (150 lb 5.7 oz)   LMP  (LMP Unknown)   SpO2 100%   BMI 25.81 kg/m²   Weight:   Vitals:    07/02/24  1007   Weight: 68.2 kg (150 lb 5.7 oz)       Physical Exam  Vitals reviewed.   Constitutional:       Appearance: Normal appearance.   HENT:      Head: Normocephalic and atraumatic.      Nose: Nose normal.      Mouth/Throat:      Mouth: Mucous membranes are moist.      Pharynx: Oropharynx is clear.   Eyes:      Conjunctiva/sclera: Conjunctivae normal.      Pupils: Pupils are equal, round, and reactive to light.   Cardiovascular:      Rate and Rhythm: Normal rate and regular rhythm.      Pulses: Normal pulses.      Heart sounds: Normal heart sounds.   Pulmonary:      Effort: Pulmonary effort is normal.      Breath sounds: Normal breath sounds.   Abdominal:      General: Bowel sounds are normal. There is no distension.      Palpations: Abdomen is soft.      Tenderness: There is no abdominal tenderness.   Musculoskeletal:      Cervical back: Normal range of motion and neck supple.      Right lower leg: No edema.      Left lower leg: No edema.   Skin:     General: Skin is warm and dry.   Neurological:      General: No focal deficit present.      Mental Status: She is alert and oriented to person, place, and time.   Psychiatric:         Mood and Affect: Mood normal.         Behavior: Behavior normal.         Diagnostic Results     Labs  Results from last 7 days   Lab Units 07/02/24  1022   WBC AUTO x10*3/uL 6.1   HEMOGLOBIN g/dL 10.4*   HEMATOCRIT % 29.9*   PLATELETS AUTO x10*3/uL 180   NEUTROS ABS x10*3/uL 4.50   LYMPHS ABS AUTO x10*3/uL 0.93*   MONOS ABS AUTO x10*3/uL 0.48   EOS ABS AUTO x10*3/uL 0.13   NEUTROS PCT AUTO % 73.8   LYMPHS PCT AUTO % 15.2   MONOS PCT AUTO % 7.9   EOS PCT AUTO % 2.1      Results from last 7 days   Lab Units 07/02/24  1022   GLUCOSE mg/dL 111*   SODIUM mmol/L 138   POTASSIUM mmol/L 2.7*   CHLORIDE mmol/L 98   CO2 mmol/L 29   BUN mg/dL 8   CREATININE mg/dL 0.97   EGFR mL/min/1.73m*2 67   CALCIUM mg/dL 9.7   ALBUMIN g/dL 4.3   PROTEIN TOTAL g/dL 7.0     Results from last 7 days   Lab Units  07/02/24  1022   BILIRUBIN TOTAL mg/dL 1.2   ALK PHOS U/L 71   ALT U/L 12   AST U/L 17         Results from last 7 days   Lab Units 07/02/24  1022   RETIC CT PCT % 4.5*   RETIC CT ABS x10*6/uL 0.171*   IMMATURE RETIC FRACTION % 34.4*   RETIC HGB pg 30     Results from last 7 days   Lab Units 07/02/24  1022   LD U/L 165         Lab Results   Component Value Date    HGB 10.2 (L) 04/29/2024    HGB 10.4 (L) 02/23/2024    HGB 10.6 (L) 07/13/2023     04/29/2024     02/23/2024     07/13/2023     04/29/2024     02/23/2024     05/19/2023       Images  === 11/30/23 ===    XR CERVICAL SPINE 2-3 VIEWS    - Impression -  Mild degenerative changes greatest C5-6.    Signed by: Camron Witt 12/1/2023 5:48 PM  Dictation workstation:   [id number]   === 04/16/24 ===    CT ABDOMEN PELVIS WO IV CONTRAST    - Impression -  1. No acute abnormality in the abdomen or pelvis within the  limitation of this unenhanced study.  2. No left upper quadrant hernias. Small bilateral fat containing  inguinal hernias.  3. Large hiatal hernia.  4. Uncomplicated colonic diverticulosis.  5. Borderline splenomegaly measuring 13 cm unchanged from prior.  6. Mottled background bone marrow density unchanged from prior.  Finding is nonspecific, however advise correlation with CBC for  evaluation for underlying hemoglobinopathies  7. Elevated left hemidiaphragm as in prior.      MACRO:  None    Signed by: Quoc Crooks 4/17/2024 1:00 PM  Dictation workstation:   [id number]     No echocardiogram results found for the past 12 months       Assessment/Plan   Sonja Dallas is a 63 y.o. female with Hb S beta plus thalassemia presents to [facility] with uncontrolled pain in          ACC Course  - VSS  - Hemolysis labs near baseline, no indication of acute vaso-occlusive crisis or indication for blood transfusion   - Hypokalemia 2.7, 40 mEQ po potassium - patient reports she did not take her home po potassium the last day or 2,  instructed to take additional dose at home tonight   - Flexeril 5 mg, given for AVN  - dilaudid 0.4mg SQ x 3 doses given for sickle cell related pain  - Zofran 8 mg once for opioid induced nausea/vomiting  - Benadryl 25 mg once for opioid induced pruritus   - pain still poorly controlled despite above interventions, patient would like to be admitted   - additional 0.4 mg SubQ dilaudid while she was waiting for a bed     Disposition  - admit for pain management             Devi Ferrera, APRN-CNP

## 2024-07-03 LAB
ALBUMIN SERPL BCP-MCNC: 4 G/DL (ref 3.4–5)
ALP SERPL-CCNC: 71 U/L (ref 33–136)
ALT SERPL W P-5'-P-CCNC: 9 U/L (ref 7–45)
ANION GAP SERPL CALC-SCNC: 10 MMOL/L (ref 10–20)
AST SERPL W P-5'-P-CCNC: 17 U/L (ref 9–39)
BASOPHILS # BLD AUTO: 0.04 X10*3/UL (ref 0–0.1)
BASOPHILS NFR BLD AUTO: 0.7 %
BILIRUB SERPL-MCNC: 1.6 MG/DL (ref 0–1.2)
BUN SERPL-MCNC: 8 MG/DL (ref 6–23)
CALCIUM SERPL-MCNC: 9.3 MG/DL (ref 8.6–10.6)
CHLORIDE SERPL-SCNC: 98 MMOL/L (ref 98–107)
CO2 SERPL-SCNC: 32 MMOL/L (ref 21–32)
CREAT SERPL-MCNC: 1.11 MG/DL (ref 0.5–1.05)
EGFRCR SERPLBLD CKD-EPI 2021: 57 ML/MIN/1.73M*2
EOSINOPHIL # BLD AUTO: 0.25 X10*3/UL (ref 0–0.7)
EOSINOPHIL NFR BLD AUTO: 4.7 %
ERYTHROCYTE [DISTWIDTH] IN BLOOD BY AUTOMATED COUNT: 14.3 % (ref 11.5–14.5)
GLUCOSE SERPL-MCNC: 102 MG/DL (ref 74–99)
HCT VFR BLD AUTO: 27.4 % (ref 36–46)
HGB BLD-MCNC: 9.3 G/DL (ref 12–16)
HGB RETIC QN: 30 PG (ref 28–38)
IMM GRANULOCYTES # BLD AUTO: 0.11 X10*3/UL (ref 0–0.7)
IMM GRANULOCYTES NFR BLD AUTO: 2.1 % (ref 0–0.9)
IMMATURE RETIC FRACTION: 39.4 %
LDH SERPL L TO P-CCNC: 172 U/L (ref 84–246)
LYMPHOCYTES # BLD AUTO: 1.24 X10*3/UL (ref 1.2–4.8)
LYMPHOCYTES NFR BLD AUTO: 23.2 %
MAGNESIUM SERPL-MCNC: 2.24 MG/DL (ref 1.6–2.4)
MCH RBC QN AUTO: 27.1 PG (ref 26–34)
MCHC RBC AUTO-ENTMCNC: 33.9 G/DL (ref 32–36)
MCV RBC AUTO: 80 FL (ref 80–100)
MONOCYTES # BLD AUTO: 0.5 X10*3/UL (ref 0.1–1)
MONOCYTES NFR BLD AUTO: 9.3 %
NEUTROPHILS # BLD AUTO: 3.21 X10*3/UL (ref 1.2–7.7)
NEUTROPHILS NFR BLD AUTO: 60 %
NRBC BLD-RTO: 0 /100 WBCS (ref 0–0)
PLATELET # BLD AUTO: 149 X10*3/UL (ref 150–450)
POTASSIUM SERPL-SCNC: 3 MMOL/L (ref 3.5–5.3)
PROT SERPL-MCNC: 6.5 G/DL (ref 6.4–8.2)
RBC # BLD AUTO: 3.43 X10*6/UL (ref 4–5.2)
RETICS #: 0.17 X10*6/UL (ref 0.02–0.08)
RETICS/RBC NFR AUTO: 4.9 % (ref 0.5–2)
SODIUM SERPL-SCNC: 137 MMOL/L (ref 136–145)
WBC # BLD AUTO: 5.4 X10*3/UL (ref 4.4–11.3)

## 2024-07-03 PROCEDURE — 2500000002 HC RX 250 W HCPCS SELF ADMINISTERED DRUGS (ALT 637 FOR MEDICARE OP, ALT 636 FOR OP/ED)

## 2024-07-03 PROCEDURE — 2500000004 HC RX 250 GENERAL PHARMACY W/ HCPCS (ALT 636 FOR OP/ED)

## 2024-07-03 PROCEDURE — 2500000004 HC RX 250 GENERAL PHARMACY W/ HCPCS (ALT 636 FOR OP/ED): Performed by: NURSE PRACTITIONER

## 2024-07-03 PROCEDURE — 83615 LACTATE (LD) (LDH) ENZYME: CPT

## 2024-07-03 PROCEDURE — 80053 COMPREHEN METABOLIC PANEL: CPT

## 2024-07-03 PROCEDURE — G0378 HOSPITAL OBSERVATION PER HR: HCPCS

## 2024-07-03 PROCEDURE — 2500000001 HC RX 250 WO HCPCS SELF ADMINISTERED DRUGS (ALT 637 FOR MEDICARE OP): Performed by: NURSE PRACTITIONER

## 2024-07-03 PROCEDURE — 83735 ASSAY OF MAGNESIUM: CPT

## 2024-07-03 PROCEDURE — 85045 AUTOMATED RETICULOCYTE COUNT: CPT

## 2024-07-03 PROCEDURE — 36415 COLL VENOUS BLD VENIPUNCTURE: CPT

## 2024-07-03 PROCEDURE — 2500000001 HC RX 250 WO HCPCS SELF ADMINISTERED DRUGS (ALT 637 FOR MEDICARE OP)

## 2024-07-03 PROCEDURE — 85025 COMPLETE CBC W/AUTO DIFF WBC: CPT

## 2024-07-03 RX ORDER — ACETAMINOPHEN 500 MG
1 TABLET ORAL EVERY 6 HOURS PRN
COMMUNITY

## 2024-07-03 RX ORDER — HYDROMORPHONE HYDROCHLORIDE 1 MG/ML
0.6 INJECTION, SOLUTION INTRAMUSCULAR; INTRAVENOUS; SUBCUTANEOUS ONCE
Status: COMPLETED | OUTPATIENT
Start: 2024-07-03 | End: 2024-07-03

## 2024-07-03 RX ORDER — DICLOFENAC SODIUM 10 MG/G
4 GEL TOPICAL 4 TIMES DAILY PRN
Status: DISCONTINUED | OUTPATIENT
Start: 2024-07-03 | End: 2024-07-04

## 2024-07-03 RX ORDER — FOLIC ACID 1 MG/1
2 TABLET ORAL DAILY
Status: DISCONTINUED | OUTPATIENT
Start: 2024-07-03 | End: 2024-07-07 | Stop reason: HOSPADM

## 2024-07-03 RX ORDER — POTASSIUM CHLORIDE 1.5 G/1.58G
40 POWDER, FOR SOLUTION ORAL ONCE
Status: COMPLETED | OUTPATIENT
Start: 2024-07-03 | End: 2024-07-03

## 2024-07-03 RX ORDER — HYDROMORPHONE HYDROCHLORIDE 1 MG/ML
0.4 INJECTION, SOLUTION INTRAMUSCULAR; INTRAVENOUS; SUBCUTANEOUS ONCE
Status: COMPLETED | OUTPATIENT
Start: 2024-07-03 | End: 2024-07-03

## 2024-07-03 ASSESSMENT — PAIN - FUNCTIONAL ASSESSMENT
PAIN_FUNCTIONAL_ASSESSMENT: 0-10

## 2024-07-03 ASSESSMENT — PAIN SCALES - GENERAL
PAINLEVEL_OUTOF10: 8
PAINLEVEL_OUTOF10: 8
PAINLEVEL_OUTOF10: 9
PAINLEVEL_OUTOF10: 9
PAINLEVEL_OUTOF10: 8
PAINLEVEL_OUTOF10: 8
PAINLEVEL_OUTOF10: 9
PAINLEVEL_OUTOF10: 8
PAINLEVEL_OUTOF10: 9
PAINLEVEL_OUTOF10: 8
PAINLEVEL_OUTOF10: 9
PAINLEVEL_OUTOF10: 6

## 2024-07-03 ASSESSMENT — COGNITIVE AND FUNCTIONAL STATUS - GENERAL
MOBILITY SCORE: 24
DAILY ACTIVITIY SCORE: 24
DAILY ACTIVITIY SCORE: 24
MOBILITY SCORE: 24

## 2024-07-03 NOTE — PROGRESS NOTES
Pharmacy Admission Order Reconciliation Review    Sonja Dallas is a 63 y.o. female admitted for Sickle cell anemia with pain (Multi). Pharmacy reviewed the patient's unreconciled admission medications.    Prior to admission medications that were reviewed and acted on by the pharmacist include:  Tylenol  Vit E  These medications have been reconciled.     Any other unreconcilied medications have been addressed and will be ordered or held by the patient's medical team. Medications addressed by the pharmacist may be added or changed by the patient's medical team at any time.    Racquel Chang, Leena  Transitions of Care Pharmacist  [facility]  Please reach out via Secure Chat for questions

## 2024-07-03 NOTE — CARE PLAN
The patient's goals for the shift include      The clinical goals for the shift include pt will report pain < 7/10 by end of shift 7/3/24 @0700      Problem: Pain  Goal: Takes deep breaths with improved pain control throughout the shift  Outcome: Progressing  Goal: Turns in bed with improved pain control throughout the shift  Outcome: Progressing  Goal: Walks with improved pain control throughout the shift  Outcome: Progressing  Goal: Performs ADL's with improved pain control throughout shift  Outcome: Progressing     Problem: Pain - Adult  Goal: Verbalizes/displays adequate comfort level or baseline comfort level  Outcome: Progressing     Problem: Safety - Adult  Goal: Free from fall injury  Outcome: Progressing

## 2024-07-03 NOTE — PROGRESS NOTES
Pharmacy Medication History Review    Sonja Dallas is a 63 y.o. female admitted for Sickle cell anemia with pain (Multi). Pharmacy reviewed the patient's [id number] medications and allergies for accuracy.    The list below reflects the updated PTA list. Comments regarding how patient may be taking medications differently can be found in the Admit Orders Activity  Prior to Admission Medications   Prescriptions Last Dose Informant Patient Reported?   FLAXSEED OIL ORAL  Self Yes   Sig: Take with water in when needed   VITAMIN E ACETATE ORAL  Self Yes   Sig: Take 1 tablet by mouth once daily.   acetaminophen (Tylenol) 500 mg tablet  Self Yes   Sig: Take 1 tablet (500 mg) by mouth every 6 hours if needed.   buPROPion XL (Wellbutrin XL) 300 mg 24 hr tablet  Self No   Sig: Take 1 tablet (300 mg) by mouth once daily.   butalbital-acetaminophen-caff (Fioricet) -40 mg capsule  Self Yes   Sig: Take 1 capsule by mouth if needed.   cholecalciferol (Vitamin D3) 10 MCG (400 UNIT) tablet  Self Yes   Sig: Take 1 tablet (10 mcg) by mouth once daily.   ferrous sulfate, 325 mg ferrous sulfate, tablet  Self Yes   Sig: Take 1 tablet by mouth once daily.   folic acid (Folvite) 1 mg tablet  Self Yes   Sig: Take 1 tablet (1 mg) by mouth once daily.   multivitamin (MULTIPLE VITAMINS ORAL)  Self Yes   Sig: Take by mouth once daily.   naloxone (Narcan) 4 mg/0.1 mL nasal spray  Self No   Sig: Administer 1 spray (4 mg) into affected nostril(s) if needed for opioid reversal. May repeat every 2-3 minutes if needed, alternating nostrils, until medical assistance becomes available.   omeprazole (PriLOSEC) 40 mg DR capsule  Self No   Sig: Take 1 capsule (40 mg) by mouth once daily in the morning.   oxyCODONE (Roxicodone) 5 mg immediate release tablet  Self No   Sig: Take 1 tablet (5 mg) by mouth every 6 hours if needed for severe pain (7 - 10).   potassium chloride CR 20 mEq ER tablet  Self No   Sig: Take 2 tablets (40 mEq) by mouth  once daily.   triamterene-hydrochlorothiazid (Maxzide) 75-50 mg tablet  Self No   Sig: Take 1 tablet by mouth once daily.   vitamin A 2,400 mcg capsule  Self Yes   Sig: Take 1 capsule (2.4 mg) by mouth every other day.      Facility-Administered Medications: None       The list below reflects the updated allergy list. Please review each documented allergy for additional clarification and justification.  Allergies  Reviewed by Jesus Sevilla RN on 7/2/2024        Severity Reactions Comments    Gabapentin Not Specified Other Abdominal pain    Gloves, Latex With Aloe Vera Not Specified Swelling Latex Gloves MISC    Latex Not Specified Swelling gloves    Morphine Not Specified Unknown itching    Naproxen Not Specified Unknown             Patient declines M2B at discharge. Pharmacy has been updated to [facility] in [employer] in [place].    Sources used to complete the med history include out patient fill history, OARRS, and patient interview    Reliable historian. Patient at bedside able to communicate which medications currently taking, doses, and frequency    Medications ADDED:  Vitamin E  Tylenol  Medications REMOVED:   Doxycycline   Below are additional concerns with the patient's PTA list.  N/a    Isaura TaylorD  Transitions of Care Pharmacist  [facility]  Please reach out via Secure Chat for questions, or if no response call [employer] or [facility]

## 2024-07-03 NOTE — PROGRESS NOTES
"Sonja Dallas is a 63 y.o. female on day 0 of admission presenting with Sickle cell anemia with pain (Multi).    Subjective   Seen at bedside, reports that she slept too long without asking for pain medicine so she's playing \"catch up\" this morning. Denies N/V/D/C, chest pain, cough, shortness of breath.  Pain is 8/10, just got medicine. Got one extra dose early this AM. Discussed possibly rotating IV and PO tomorrow, she will see how she feels but think she will need to wait until Friday.        Objective     Physical Exam  Constitutional:       General: She is not in acute distress.     Appearance: She is not toxic-appearing.   HENT:      Head: Normocephalic and atraumatic.   Eyes:      General: No scleral icterus.     Extraocular Movements: Extraocular movements intact.   Cardiovascular:      Rate and Rhythm: Normal rate and regular rhythm.   Pulmonary:      Effort: No respiratory distress.   Abdominal:      General: Abdomen is flat.      Palpations: Abdomen is soft.      Tenderness: There is no abdominal tenderness.   Musculoskeletal:         General: No swelling or tenderness.   Skin:     Coloration: Skin is not jaundiced.      Findings: No bruising or erythema.   Neurological:      Mental Status: She is oriented to person, place, and time. Mental status is at baseline.         Last Recorded Vitals  Blood pressure 138/88, pulse 73, temperature 36.3 °C (97.3 °F), temperature source Temporal, resp. rate 18, SpO2 96%.  Intake/Output last 3 Shifts:  No intake/output data recorded.      Assessment/Plan   Principal Problem:    Sickle cell anemia with pain (Multi)    Sonja Dallas is a 63 y.o. female with a past medical history of sickle beta plus thalassemia (HgbSB+), R+L hip AVN s/p R hip replacement 2007, lung adenocarcinoma (dx 2012, s/p lobectomy, no chemo no XRT at [facility], followed by Dr. Chamorro), HTN, TTP (2004), who presented to [facility] 7/2 for uncontrolled pain in R shoulder. She was taking her home oxycodone " without relief. Received 0.4mg subcutaneous dilaudid x 3 doses without adequate pain control, admitted for further management. Started on 0.6mg IVP dilaudid q2h.      # Sickle Cell Disease with Pain  - OARRS reviewed, last filled oxycodone 5mg #60 tabs on 6/6/24   - No carepath   - Hgb baseline ~10; 10.4 on admit, no need for simple transfusion    - Lysis labs at baseline   - no indication for CXR at time of admit, monitor for CP or resp symptoms / signs of infection   - Started on dilaudid 0.6mg IVP q2h on admit for pain management  - Hgb S pending (7/2)  - Utox (7/2) appropriately positive for cannabinoids (prescribed medical marijuana)  - Continue home folic acid, iron, vitamin D, MV daily   - continue home fioricet for headaches PRN   - Bowel regimen for opioid induced constipation with DocuSenna tabs BID and Miralax daily PRN  - PO Benadryl PRN for opioid-induced pruritus  - PO Zofran PRN for opioid-induced nausea     # Chronic Hypokalemia  - only daily Kcl supplementation at home   - K 2.7 in [facility], s/p repletion; patient admits to missing 1-2 days doses  - continue home 20mEq Kcl daily  - dialy CMP and replete additional PRN      # Anxiety / Depression  - continue home wellbutrin 300mg daily      # HTN  - continue home maxzide 75-50mg tab         DVT prophy: Lovenox SQ, SCDs, encourage ambulation     DISPO:  - Full Code  - DC pending improvement in pain  - FUV: Esophagram and Ophthal 7/15, Sickle Cell 7/29        I spent >60 minutes in the professional and overall care of this patient.      Aria Rodríguez, APRN-CNP

## 2024-07-03 NOTE — CARE PLAN
The clinical goals for the shift include patient will rate pain less than an 8/10 throughout shift 7/3 at 1900      Problem: Pain  Goal: Takes deep breaths with improved pain control throughout the shift  Outcome: Progressing     Problem: Pain  Goal: Turns in bed with improved pain control throughout the shift  Outcome: Progressing     Problem: Pain  Goal: Walks with improved pain control throughout the shift  Outcome: Progressing     Problem: Pain - Adult  Goal: Verbalizes/displays adequate comfort level or baseline comfort level  Outcome: Progressing     Problem: Safety - Adult  Goal: Free from fall injury  Outcome: Progressing

## 2024-07-04 PROBLEM — D57.00 SICKLE CELL CRISIS (MULTI): Status: ACTIVE | Noted: 2024-07-04

## 2024-07-04 LAB
ALBUMIN SERPL BCP-MCNC: 4 G/DL (ref 3.4–5)
ALP SERPL-CCNC: 68 U/L (ref 33–136)
ALT SERPL W P-5'-P-CCNC: 11 U/L (ref 7–45)
ANION GAP SERPL CALC-SCNC: 11 MMOL/L (ref 10–20)
AST SERPL W P-5'-P-CCNC: 19 U/L (ref 9–39)
BASOPHILS # BLD AUTO: 0.03 X10*3/UL (ref 0–0.1)
BASOPHILS NFR BLD AUTO: 0.5 %
BILIRUB SERPL-MCNC: 1.3 MG/DL (ref 0–1.2)
BUN SERPL-MCNC: 8 MG/DL (ref 6–23)
CALCIUM SERPL-MCNC: 9.6 MG/DL (ref 8.6–10.6)
CHLORIDE SERPL-SCNC: 98 MMOL/L (ref 98–107)
CO2 SERPL-SCNC: 30 MMOL/L (ref 21–32)
CREAT SERPL-MCNC: 0.88 MG/DL (ref 0.5–1.05)
EGFRCR SERPLBLD CKD-EPI 2021: 75 ML/MIN/1.73M*2
EOSINOPHIL # BLD AUTO: 0.36 X10*3/UL (ref 0–0.7)
EOSINOPHIL NFR BLD AUTO: 6.3 %
ERYTHROCYTE [DISTWIDTH] IN BLOOD BY AUTOMATED COUNT: 14.6 % (ref 11.5–14.5)
GLUCOSE SERPL-MCNC: 86 MG/DL (ref 74–99)
HCT VFR BLD AUTO: 27 % (ref 36–46)
HGB BLD-MCNC: 9.1 G/DL (ref 12–16)
HGB RETIC QN: 30 PG (ref 28–38)
IMM GRANULOCYTES # BLD AUTO: 0.02 X10*3/UL (ref 0–0.7)
IMM GRANULOCYTES NFR BLD AUTO: 0.4 % (ref 0–0.9)
IMMATURE RETIC FRACTION: 40.8 %
LDH SERPL L TO P-CCNC: 173 U/L (ref 84–246)
LYMPHOCYTES # BLD AUTO: 1.2 X10*3/UL (ref 1.2–4.8)
LYMPHOCYTES NFR BLD AUTO: 21.2 %
MAGNESIUM SERPL-MCNC: 1.87 MG/DL (ref 1.6–2.4)
MCH RBC QN AUTO: 27.7 PG (ref 26–34)
MCHC RBC AUTO-ENTMCNC: 33.7 G/DL (ref 32–36)
MCV RBC AUTO: 82 FL (ref 80–100)
MONOCYTES # BLD AUTO: 0.53 X10*3/UL (ref 0.1–1)
MONOCYTES NFR BLD AUTO: 9.3 %
NEUTROPHILS # BLD AUTO: 3.53 X10*3/UL (ref 1.2–7.7)
NEUTROPHILS NFR BLD AUTO: 62.3 %
NRBC BLD-RTO: 0.4 /100 WBCS (ref 0–0)
PLATELET # BLD AUTO: 147 X10*3/UL (ref 150–450)
POTASSIUM SERPL-SCNC: 3.6 MMOL/L (ref 3.5–5.3)
PROT SERPL-MCNC: 6.5 G/DL (ref 6.4–8.2)
RBC # BLD AUTO: 3.29 X10*6/UL (ref 4–5.2)
RETICS #: 0.17 X10*6/UL (ref 0.02–0.08)
RETICS/RBC NFR AUTO: 5.3 % (ref 0.5–2)
SODIUM SERPL-SCNC: 135 MMOL/L (ref 136–145)
WBC # BLD AUTO: 5.7 X10*3/UL (ref 4.4–11.3)

## 2024-07-04 PROCEDURE — 83615 LACTATE (LD) (LDH) ENZYME: CPT

## 2024-07-04 PROCEDURE — 2500000001 HC RX 250 WO HCPCS SELF ADMINISTERED DRUGS (ALT 637 FOR MEDICARE OP)

## 2024-07-04 PROCEDURE — 1170000001 HC PRIVATE ONCOLOGY ROOM DAILY

## 2024-07-04 PROCEDURE — 85045 AUTOMATED RETICULOCYTE COUNT: CPT

## 2024-07-04 PROCEDURE — 36415 COLL VENOUS BLD VENIPUNCTURE: CPT

## 2024-07-04 PROCEDURE — 2500000001 HC RX 250 WO HCPCS SELF ADMINISTERED DRUGS (ALT 637 FOR MEDICARE OP): Performed by: NURSE PRACTITIONER

## 2024-07-04 PROCEDURE — 2500000004 HC RX 250 GENERAL PHARMACY W/ HCPCS (ALT 636 FOR OP/ED)

## 2024-07-04 PROCEDURE — 2500000002 HC RX 250 W HCPCS SELF ADMINISTERED DRUGS (ALT 637 FOR MEDICARE OP, ALT 636 FOR OP/ED)

## 2024-07-04 PROCEDURE — 83735 ASSAY OF MAGNESIUM: CPT

## 2024-07-04 PROCEDURE — 80053 COMPREHEN METABOLIC PANEL: CPT

## 2024-07-04 PROCEDURE — 99233 SBSQ HOSP IP/OBS HIGH 50: CPT

## 2024-07-04 PROCEDURE — 85025 COMPLETE CBC W/AUTO DIFF WBC: CPT

## 2024-07-04 RX ORDER — HYDROMORPHONE HYDROCHLORIDE 1 MG/ML
0.6 INJECTION, SOLUTION INTRAMUSCULAR; INTRAVENOUS; SUBCUTANEOUS EVERY 4 HOURS PRN
Status: DISCONTINUED | OUTPATIENT
Start: 2024-07-04 | End: 2024-07-05

## 2024-07-04 RX ORDER — DICLOFENAC SODIUM 10 MG/G
4 GEL TOPICAL 4 TIMES DAILY PRN
Status: DISCONTINUED | OUTPATIENT
Start: 2024-07-04 | End: 2024-07-07 | Stop reason: HOSPADM

## 2024-07-04 RX ORDER — FLUCONAZOLE 150 MG/1
150 TABLET ORAL ONCE
Status: COMPLETED | OUTPATIENT
Start: 2024-07-04 | End: 2024-07-04

## 2024-07-04 ASSESSMENT — PAIN - FUNCTIONAL ASSESSMENT
PAIN_FUNCTIONAL_ASSESSMENT: 0-10

## 2024-07-04 ASSESSMENT — PAIN SCALES - GENERAL
PAINLEVEL_OUTOF10: 8
PAINLEVEL_OUTOF10: 7
PAINLEVEL_OUTOF10: 9
PAINLEVEL_OUTOF10: 7
PAINLEVEL_OUTOF10: 10 - WORST POSSIBLE PAIN
PAINLEVEL_OUTOF10: 8
PAINLEVEL_OUTOF10: 7
PAINLEVEL_OUTOF10: 9
PAINLEVEL_OUTOF10: 8
PAINLEVEL_OUTOF10: 7
PAINLEVEL_OUTOF10: 9
PAINLEVEL_OUTOF10: 8

## 2024-07-04 ASSESSMENT — COGNITIVE AND FUNCTIONAL STATUS - GENERAL
MOBILITY SCORE: 24
MOBILITY SCORE: 24
DAILY ACTIVITIY SCORE: 24
DAILY ACTIVITIY SCORE: 24

## 2024-07-04 NOTE — CARE PLAN
The clinical goals for the shift include patient will rate pain less than a 7/10 throughout shift at 1900      Problem: Pain  Goal: Takes deep breaths with improved pain control throughout the shift  Outcome: Progressing     Problem: Pain  Goal: Walks with improved pain control throughout the shift  Outcome: Progressing     Problem: Pain  Goal: Performs ADL's with improved pain control throughout shift  Outcome: Progressing     Problem: Pain  Goal: Participates in PT with improved pain control throughout the shift  Outcome: Progressing     Problem: Safety - Adult  Goal: Free from fall injury  Outcome: Progressing

## 2024-07-04 NOTE — PROGRESS NOTES
Sonja Dallas is a 63 y.o. female on day 0 of admission presenting with Sickle cell anemia with pain (Multi).    Subjective   Seen at bedside, JAMAL. States pain was very well controlled overnight. Denies N/V/D/C, chest pain, cough, shortness of breath.  Appetite is good, having bowel movements.     Discussed plan of rotating IVP and PO tomorrow, discharge home on Saturday.        Objective     Physical Exam  Constitutional:       General: She is not in acute distress.     Appearance: She is not toxic-appearing.   HENT:      Head: Normocephalic and atraumatic.   Eyes:      General: No scleral icterus.     Extraocular Movements: Extraocular movements intact.   Cardiovascular:      Rate and Rhythm: Normal rate and regular rhythm.   Pulmonary:      Effort: No respiratory distress.   Abdominal:      General: Abdomen is flat.      Palpations: Abdomen is soft.      Tenderness: There is no abdominal tenderness.   Musculoskeletal:         General: No swelling or tenderness.   Skin:     Coloration: Skin is not jaundiced.      Findings: No bruising or erythema.   Neurological:      Mental Status: She is oriented to person, place, and time. Mental status is at baseline.         Last Recorded Vitals  Blood pressure 131/75, pulse 83, temperature 36.4 °C (97.5 °F), temperature source Temporal, resp. rate 16, SpO2 97%.  Intake/Output last 3 Shifts:  I/O last 3 completed shifts:  In: 750 [P.O.:250; IV Piggyback:500]  Out: 0       Assessment/Plan   Principal Problem:    Sickle cell anemia with pain (Multi)    Sonja Dallas is a 63 y.o. female with a past medical history of sickle beta plus thalassemia (HgbSB+), R+L hip AVN s/p R hip replacement 2007, lung adenocarcinoma (dx 2012, s/p lobectomy, no chemo no XRT at [facility], followed by Dr. Chamorro), HTN, TTP (2004), who presented to [facility] 7/2 for uncontrolled pain in R shoulder. She was taking her home oxycodone without relief. Received 0.4mg subcutaneous dilaudid x 3 doses without  adequate pain control, admitted for further management. Started on 0.6mg IVP dilaudid q2h.      # Sickle Cell Disease with Pain  - OARRS reviewed, last filled oxycodone 5mg #60 tabs on 6/6/24   - No carepath   - Hgb baseline ~10; 10.4 on admit, no need for simple transfusion    - Lysis labs at baseline   - no indication for CXR at time of admit, monitor for CP or resp symptoms / signs of infection   - Started on dilaudid 0.6mg IVP q2h on admit for pain management  - started voltaren gel PRN 7/4   - Hgb S (7/2):  71.4%  - Utox (7/2) appropriately positive for cannabinoids (prescribed medical marijuana)  - Continue home folic acid, iron, vitamin D, MV daily   - continue home fioricet for headaches PRN   - Bowel regimen for opioid induced constipation with DocuSenna tabs BID and Miralax daily PRN  - PO Benadryl PRN for opioid-induced pruritus  - PO Zofran PRN for opioid-induced nausea     # Chronic Hypokalemia  - only daily Kcl supplementation at home   - K 2.7 in [facility], s/p repletion; patient admits to missing 1-2 days doses  - continue home 20mEq Kcl daily  - dialy CMP and replete additional PRN      # Anxiety / Depression  - continue home wellbutrin 300mg daily      # HTN  - continue home maxzide 75-50mg tab         DVT prophy: Lovenox SQ, SCDs, encourage ambulation     DISPO:  - Full Code  - DC pending improvement in pain  - FUV: Esophagram and Ophthal 7/15, Sickle Cell 7/29        I spent >60 minutes in the professional and overall care of this patient.      Aria Rodríguez, APRN-CNP

## 2024-07-04 NOTE — CARE PLAN
The clinical goals for the shift include Patient will report pain less than 6/10 through end of shift 7/4 0700    Problem: Pain  Goal: Takes deep breaths with improved pain control throughout the shift  7/4/2024 0532 by Arielle Peterson RN  Outcome: Progressing  7/4/2024 0531 by Arielle Peterson RN  Outcome: Progressing  Goal: Turns in bed with improved pain control throughout the shift  7/4/2024 0532 by Arielle Peterson RN  Outcome: Progressing  7/4/2024 0531 by Arielle Peterson RN  Outcome: Progressing  Goal: Walks with improved pain control throughout the shift  7/4/2024 0532 by Arielle Peterson RN  Outcome: Progressing  7/4/2024 0531 by Arielle Peterson RN  Outcome: Progressing  Goal: Performs ADL's with improved pain control throughout shift  7/4/2024 0532 by Arielle Peterson RN  Outcome: Progressing  7/4/2024 0531 by Arielle Peterson RN  Outcome: Progressing  Goal: Participates in PT with improved pain control throughout the shift  7/4/2024 0532 by Arielle Peterson RN  Outcome: Progressing  7/4/2024 0531 by Arielle Peterson RN  Outcome: Progressing  Goal: Free from opioid side effects throughout the shift  7/4/2024 0532 by Arielle Peterson RN  Outcome: Progressing  7/4/2024 0531 by Arielle Peterson RN  Outcome: Progressing  Goal: Free from acute confusion related to pain meds throughout the shift  7/4/2024 0532 by Arielle Peterson RN  Outcome: Progressing  7/4/2024 0531 by Arielle Peterson RN  Outcome: Progressing     Problem: Pain - Adult  Goal: Verbalizes/displays adequate comfort level or baseline comfort level  7/4/2024 0532 by Arielle Peterson RN  Outcome: Progressing  7/4/2024 0531 by Arielle Peterson RN  Outcome: Progressing     Problem: Safety - Adult  Goal: Free from fall injury  7/4/2024 0532 by Arielle Peterson RN  Outcome: Progressing  7/4/2024 0531 by Arielle Peterson RN  Outcome: Progressing     Problem: Discharge Planning  Goal: Discharge to home or other facility with appropriate resources  7/4/2024 0532 by Arielle Peterson RN  Outcome: Progressing  7/4/2024 0531 by Arielle Peterson  RN  Outcome: Progressing     Problem: Chronic Conditions and Co-morbidities  Goal: Patient's chronic conditions and co-morbidity symptoms are monitored and maintained or improved  7/4/2024 0532 by Arielle Peterson RN  Outcome: Progressing  7/4/2024 0531 by Arielle Peterson RN  Outcome: Progressing

## 2024-07-05 LAB
1OH-MIDAZOLAM UR CFM-MCNC: <25 NG/ML
6MAM UR CFM-MCNC: <25 NG/ML
7AMINOCLONAZEPAM UR CFM-MCNC: <25 NG/ML
A-OH ALPRAZ UR CFM-MCNC: <25 NG/ML
ALBUMIN SERPL BCP-MCNC: 3.6 G/DL (ref 3.4–5)
ALP SERPL-CCNC: 63 U/L (ref 33–136)
ALPRAZ UR CFM-MCNC: <25 NG/ML
ALT SERPL W P-5'-P-CCNC: 9 U/L (ref 7–45)
ANION GAP SERPL CALC-SCNC: 12 MMOL/L (ref 10–20)
AST SERPL W P-5'-P-CCNC: 18 U/L (ref 9–39)
BASOPHILS # BLD AUTO: 0.03 X10*3/UL (ref 0–0.1)
BASOPHILS NFR BLD AUTO: 0.7 %
BILIRUB SERPL-MCNC: 1.1 MG/DL (ref 0–1.2)
BUN SERPL-MCNC: 10 MG/DL (ref 6–23)
CALCIUM SERPL-MCNC: 9.1 MG/DL (ref 8.6–10.6)
CARBOXYTHC UR-MCNC: 433 NG/ML
CHLORDIAZEP UR CFM-MCNC: <25 NG/ML
CHLORIDE SERPL-SCNC: 97 MMOL/L (ref 98–107)
CLONAZEPAM UR CFM-MCNC: <25 NG/ML
CO2 SERPL-SCNC: 28 MMOL/L (ref 21–32)
CODEINE UR CFM-MCNC: <50 NG/ML
CREAT SERPL-MCNC: 0.85 MG/DL (ref 0.5–1.05)
DIAZEPAM UR CFM-MCNC: <25 NG/ML
EDDP UR CFM-MCNC: <25 NG/ML
EGFRCR SERPLBLD CKD-EPI 2021: 78 ML/MIN/1.73M*2
EOSINOPHIL # BLD AUTO: 0.29 X10*3/UL (ref 0–0.7)
EOSINOPHIL NFR BLD AUTO: 6.3 %
ERYTHROCYTE [DISTWIDTH] IN BLOOD BY AUTOMATED COUNT: 14.6 % (ref 11.5–14.5)
FENTANYL UR CFM-MCNC: <2.5 NG/ML
GLUCOSE SERPL-MCNC: 97 MG/DL (ref 74–99)
HCT VFR BLD AUTO: 25.1 % (ref 36–46)
HEMOGLOBIN A2: 6 % (ref 2–3.5)
HEMOGLOBIN A: 17.9 % (ref 95.8–98)
HEMOGLOBIN F: 4.7 % (ref 0–2)
HEMOGLOBIN IDENTIFICATION INTERPRETATION: ABNORMAL
HEMOGLOBIN S: 71.4 %
HGB BLD-MCNC: 8.4 G/DL (ref 12–16)
HGB RETIC QN: 30 PG (ref 28–38)
HYDROCODONE CTO UR CFM-MCNC: <25 NG/ML
HYDROMORPHONE UR CFM-MCNC: 1442 NG/ML
IMM GRANULOCYTES # BLD AUTO: 0.03 X10*3/UL (ref 0–0.7)
IMM GRANULOCYTES NFR BLD AUTO: 0.7 % (ref 0–0.9)
IMMATURE RETIC FRACTION: 31.1 %
LDH SERPL L TO P-CCNC: 167 U/L (ref 84–246)
LORAZEPAM UR CFM-MCNC: <25 NG/ML
LYMPHOCYTES # BLD AUTO: 1.11 X10*3/UL (ref 1.2–4.8)
LYMPHOCYTES NFR BLD AUTO: 24.2 %
MAGNESIUM SERPL-MCNC: 1.76 MG/DL (ref 1.6–2.4)
MCH RBC QN AUTO: 27.3 PG (ref 26–34)
MCHC RBC AUTO-ENTMCNC: 33.5 G/DL (ref 32–36)
MCV RBC AUTO: 82 FL (ref 80–100)
METHADONE UR CFM-MCNC: <25 NG/ML
MIDAZOLAM UR CFM-MCNC: <25 NG/ML
MONOCYTES # BLD AUTO: 0.48 X10*3/UL (ref 0.1–1)
MONOCYTES NFR BLD AUTO: 10.5 %
MORPHINE UR CFM-MCNC: <50 NG/ML
NEUTROPHILS # BLD AUTO: 2.64 X10*3/UL (ref 1.2–7.7)
NEUTROPHILS NFR BLD AUTO: 57.6 %
NORDIAZEPAM UR CFM-MCNC: <25 NG/ML
NORFENTANYL UR CFM-MCNC: <2.5 NG/ML
NORHYDROCODONE UR CFM-MCNC: <25 NG/ML
NOROXYCODONE UR CFM-MCNC: >1000 NG/ML
NORTRAMADOL UR-MCNC: <50 NG/ML
NRBC BLD-RTO: 0 /100 WBCS (ref 0–0)
OXAZEPAM UR CFM-MCNC: <25 NG/ML
OXYCODONE UR CFM-MCNC: 128 NG/ML
OXYMORPHONE UR CFM-MCNC: 25 NG/ML
PATH REVIEW-HGB IDENTIFICATION: ABNORMAL
PLATELET # BLD AUTO: 149 X10*3/UL (ref 150–450)
POTASSIUM SERPL-SCNC: 3.7 MMOL/L (ref 3.5–5.3)
PROT SERPL-MCNC: 6.1 G/DL (ref 6.4–8.2)
RBC # BLD AUTO: 3.08 X10*6/UL (ref 4–5.2)
RETICS #: 0.17 X10*6/UL (ref 0.02–0.08)
RETICS/RBC NFR AUTO: 5.6 % (ref 0.5–2)
SODIUM SERPL-SCNC: 133 MMOL/L (ref 136–145)
TEMAZEPAM UR CFM-MCNC: <25 NG/ML
TRAMADOL UR CFM-MCNC: <50 NG/ML
WBC # BLD AUTO: 4.6 X10*3/UL (ref 4.4–11.3)
ZOLPIDEM UR CFM-MCNC: <25 NG/ML
ZOLPIDEM UR-MCNC: <25 NG/ML

## 2024-07-05 PROCEDURE — 2500000001 HC RX 250 WO HCPCS SELF ADMINISTERED DRUGS (ALT 637 FOR MEDICARE OP): Performed by: NURSE PRACTITIONER

## 2024-07-05 PROCEDURE — 2500000001 HC RX 250 WO HCPCS SELF ADMINISTERED DRUGS (ALT 637 FOR MEDICARE OP)

## 2024-07-05 PROCEDURE — 2500000004 HC RX 250 GENERAL PHARMACY W/ HCPCS (ALT 636 FOR OP/ED): Performed by: NURSE PRACTITIONER

## 2024-07-05 PROCEDURE — 83615 LACTATE (LD) (LDH) ENZYME: CPT

## 2024-07-05 PROCEDURE — 85025 COMPLETE CBC W/AUTO DIFF WBC: CPT

## 2024-07-05 PROCEDURE — 85045 AUTOMATED RETICULOCYTE COUNT: CPT

## 2024-07-05 PROCEDURE — 1170000001 HC PRIVATE ONCOLOGY ROOM DAILY

## 2024-07-05 PROCEDURE — 84075 ASSAY ALKALINE PHOSPHATASE: CPT

## 2024-07-05 PROCEDURE — 83735 ASSAY OF MAGNESIUM: CPT

## 2024-07-05 PROCEDURE — 2500000002 HC RX 250 W HCPCS SELF ADMINISTERED DRUGS (ALT 637 FOR MEDICARE OP, ALT 636 FOR OP/ED)

## 2024-07-05 PROCEDURE — 36415 COLL VENOUS BLD VENIPUNCTURE: CPT

## 2024-07-05 PROCEDURE — 99233 SBSQ HOSP IP/OBS HIGH 50: CPT | Performed by: NURSE PRACTITIONER

## 2024-07-05 PROCEDURE — 2500000004 HC RX 250 GENERAL PHARMACY W/ HCPCS (ALT 636 FOR OP/ED)

## 2024-07-05 RX ORDER — BISACODYL 10 MG/1
10 SUPPOSITORY RECTAL ONCE
Status: COMPLETED | OUTPATIENT
Start: 2024-07-05 | End: 2024-07-05

## 2024-07-05 RX ORDER — HYDROMORPHONE HYDROCHLORIDE 1 MG/ML
0.6 INJECTION, SOLUTION INTRAMUSCULAR; INTRAVENOUS; SUBCUTANEOUS EVERY 4 HOURS PRN
Status: DISCONTINUED | OUTPATIENT
Start: 2024-07-05 | End: 2024-07-07 | Stop reason: HOSPADM

## 2024-07-05 RX ORDER — OXYCODONE HYDROCHLORIDE 10 MG/1
10 TABLET ORAL EVERY 4 HOURS PRN
Status: DISCONTINUED | OUTPATIENT
Start: 2024-07-05 | End: 2024-07-07 | Stop reason: HOSPADM

## 2024-07-05 RX ORDER — HYDROMORPHONE HYDROCHLORIDE 1 MG/ML
0.6 INJECTION, SOLUTION INTRAMUSCULAR; INTRAVENOUS; SUBCUTANEOUS EVERY 4 HOURS PRN
Status: COMPLETED | OUTPATIENT
Start: 2024-07-05 | End: 2024-07-05

## 2024-07-05 ASSESSMENT — COGNITIVE AND FUNCTIONAL STATUS - GENERAL
DAILY ACTIVITIY SCORE: 24
MOBILITY SCORE: 24
DAILY ACTIVITIY SCORE: 24
MOBILITY SCORE: 24

## 2024-07-05 ASSESSMENT — PAIN SCALES - GENERAL
PAINLEVEL_OUTOF10: 6
PAINLEVEL_OUTOF10: 7
PAINLEVEL_OUTOF10: 6
PAINLEVEL_OUTOF10: 7
PAINLEVEL_OUTOF10: 6
PAINLEVEL_OUTOF10: 7
PAINLEVEL_OUTOF10: 6
PAINLEVEL_OUTOF10: 8
PAINLEVEL_OUTOF10: 7

## 2024-07-05 ASSESSMENT — PAIN DESCRIPTION - LOCATION
LOCATION: SHOULDER

## 2024-07-05 ASSESSMENT — PAIN DESCRIPTION - ORIENTATION
ORIENTATION: RIGHT

## 2024-07-05 ASSESSMENT — PAIN - FUNCTIONAL ASSESSMENT
PAIN_FUNCTIONAL_ASSESSMENT: 0-10

## 2024-07-05 NOTE — PROGRESS NOTES
Sonja Dallas is a 63 y.o. female on day 1 of admission presenting with Sickle cell anemia with pain (Multi).    Subjective   Feeling better this morning, says her pain is much improved from yesterday. Identified the trigger of her crisis as an episode of social drinking. We discussed beginning to rotate her pain medication today, she prefers oxycodone to dilaudid PO. Discussed rotating q2h and possible discharge tomorrow if improved. Having Bms.    Denies any HA, dizziness/lightheadedness, fevers/chills, cough, congestion, rhinorrhea, sore throat, SOB, CP, palpitations, abdominal pain, n/v/d/c, melena, dysuria, urgency/frequency, hematuria, numbness/tingling, bruising/bleeding or rashes. Denies any sick contacts. ROS otherwise negative.       Objective     Physical Exam  Vitals reviewed.   Constitutional:       Appearance: Normal appearance.   HENT:      Head: Normocephalic and atraumatic.      Nose: Nose normal.      Mouth/Throat:      Mouth: Mucous membranes are moist.      Pharynx: Oropharynx is clear.   Eyes:      Extraocular Movements: Extraocular movements intact.      Pupils: Pupils are equal, round, and reactive to light.   Cardiovascular:      Rate and Rhythm: Normal rate and regular rhythm.      Pulses: Normal pulses.      Heart sounds: Normal heart sounds.   Pulmonary:      Effort: Pulmonary effort is normal.      Breath sounds: Normal breath sounds.   Abdominal:      General: Bowel sounds are normal.      Palpations: Abdomen is soft.   Musculoskeletal:         General: Normal range of motion.   Skin:     General: Skin is warm.   Neurological:      General: No focal deficit present.      Mental Status: She is alert and oriented to person, place, and time. Mental status is at baseline.   Psychiatric:         Mood and Affect: Mood normal.         Behavior: Behavior normal.       Last Recorded Vitals  Blood pressure 135/87, pulse 74, temperature 36.5 °C (97.7 °F), temperature source Temporal, resp. rate  14, SpO2 95%.  Intake/Output last 3 Shifts:  I/O last 3 completed shifts:  In: 250 [P.O.:250]  Out: 0         Assessment/Plan   Principal Problem:    Sickle cell anemia with pain (Multi)  Active Problems:    Sickle cell crisis (Multi)    Sonja Dallas is a 63 y.o. female with a PMHx of sickle beta plus thalassemia (HgbSB+), R+L hip AVN s/p R hip replacement 2007, lung adenocarcinoma (dx 2012, s/p lobectomy, no chemo no XRT at [facility], followed by Dr. Chamorro), HTN, TTP (2004), who presented to [facility] 7/2 for uncontrolled pain in R shoulder. Received 0.4mg subcutaneous dilaudid x 3 doses without adequate pain control, admitted for further management. Started on IVP dilaudid, began rotating IVP dilaudid and oxycodone 7/5.      # Sickle Cell Disease with pain  - OARRS reviewed, last filled oxycodone 5mg #60 tabs on 6/6/24   - No carepath   - Hgb baseline ~10; 10.4 on admit, no need for simple transfusion at this time. Lysis labs at baseline   - no indication for CXR at time of admit, monitor for CP or resp symptoms / signs of infection   - S/p (7/2-7/5) dilaudid 0.6mg IVP q2h on admit for pain management  - 7/5 start rotating IVP dilaudid 0.6mg q4h + oxycodone 10mg q4h with plan to rotate q2h PRN   - started voltaren gel PRN 7/4   - Hgb S (7/2):  71.4%  - Utox (7/2) appropriately positive for cannabinoids (prescribed medical marijuana)  - Continue home folic acid, iron, vitamin D, MV daily   - continue home fioricet for headaches PRN   - Bowel regimen for opioid induced constipation with DocuSenna tabs BID and Miralax daily PRN  - PO Benadryl PRN for opioid-induced pruritus, PO Zofran PRN for opioid-induced nausea     # Chronic Hypokalemia  - on daily Kcl supplementation at home   - K 2.7 in [facility], s/p repletion; patient admits to missing 1-2 days doses. Adequate K as of 7/4  - continue home 20mEq Kcl daily     # Anxiety / Depression  - continue home wellbutrin 300mg daily      # HTN  - continue home maxzide 75-50mg tab       DVT prophy: Lovenox SQ, SCDs, encourage ambulation     DISPO:  - Full Code  - DC pending improvement in pain  - FUV: Esophagram and Ophthal 7/15, Sickle Cell 7/29     I spent 60 minutes in the professional and overall care of this patient.    Shannan Crockett, APRN-CNP

## 2024-07-05 NOTE — CARE PLAN
Problem: Pain  Goal: Takes deep breaths with improved pain control throughout the shift  Outcome: Progressing  Goal: Turns in bed with improved pain control throughout the shift  Outcome: Progressing  Goal: Walks with improved pain control throughout the shift  Outcome: Progressing  Goal: Performs ADL's with improved pain control throughout shift  Outcome: Progressing  Goal: Participates in PT with improved pain control throughout the shift  Outcome: Progressing  Goal: Free from opioid side effects throughout the shift  Outcome: Progressing  Goal: Free from acute confusion related to pain meds throughout the shift  Outcome: Progressing     Problem: Pain - Adult  Goal: Verbalizes/displays adequate comfort level or baseline comfort level  Outcome: Progressing     Problem: Safety - Adult  Goal: Free from fall injury  Outcome: Progressing     Problem: Discharge Planning  Goal: Discharge to home or other facility with appropriate resources  Outcome: Progressing     Problem: Chronic Conditions and Co-morbidities  Goal: Patient's chronic conditions and co-morbidity symptoms are monitored and maintained or improved  Outcome: Progressing       The clinical goals for the shift include pt will rate pain less than 7/10 throughout shift    Over the shift, the patient remained safe and free from injury. Pain in R shoulder, medicated PRN. Vitals stable throughout shift. No acute events overnight

## 2024-07-05 NOTE — CARE PLAN
The patient's goals for the shift include      The clinical goals for the shift include pt will rate pain less than 7/10 throughout shift

## 2024-07-06 LAB
ALBUMIN SERPL BCP-MCNC: 4 G/DL (ref 3.4–5)
ALP SERPL-CCNC: 71 U/L (ref 33–136)
ALT SERPL W P-5'-P-CCNC: 11 U/L (ref 7–45)
ANION GAP SERPL CALC-SCNC: 11 MMOL/L (ref 10–20)
AST SERPL W P-5'-P-CCNC: 22 U/L (ref 9–39)
BASOPHILS # BLD AUTO: 0.03 X10*3/UL (ref 0–0.1)
BASOPHILS NFR BLD AUTO: 0.7 %
BILIRUB SERPL-MCNC: 1 MG/DL (ref 0–1.2)
BUN SERPL-MCNC: 13 MG/DL (ref 6–23)
CALCIUM SERPL-MCNC: 9.6 MG/DL (ref 8.6–10.6)
CHLORIDE SERPL-SCNC: 99 MMOL/L (ref 98–107)
CO2 SERPL-SCNC: 30 MMOL/L (ref 21–32)
CREAT SERPL-MCNC: 1.03 MG/DL (ref 0.5–1.05)
EGFRCR SERPLBLD CKD-EPI 2021: 62 ML/MIN/1.73M*2
EOSINOPHIL # BLD AUTO: 0.29 X10*3/UL (ref 0–0.7)
EOSINOPHIL NFR BLD AUTO: 7.2 %
ERYTHROCYTE [DISTWIDTH] IN BLOOD BY AUTOMATED COUNT: 14.9 % (ref 11.5–14.5)
GLUCOSE SERPL-MCNC: 118 MG/DL (ref 74–99)
HCT VFR BLD AUTO: 26.2 % (ref 36–46)
HGB BLD-MCNC: 8.7 G/DL (ref 12–16)
HGB RETIC QN: 29 PG (ref 28–38)
IMM GRANULOCYTES # BLD AUTO: 0.02 X10*3/UL (ref 0–0.7)
IMM GRANULOCYTES NFR BLD AUTO: 0.5 % (ref 0–0.9)
IMMATURE RETIC FRACTION: 35.5 %
LDH SERPL L TO P-CCNC: 188 U/L (ref 84–246)
LYMPHOCYTES # BLD AUTO: 1.2 X10*3/UL (ref 1.2–4.8)
LYMPHOCYTES NFR BLD AUTO: 29.9 %
MAGNESIUM SERPL-MCNC: 1.91 MG/DL (ref 1.6–2.4)
MCH RBC QN AUTO: 27.4 PG (ref 26–34)
MCHC RBC AUTO-ENTMCNC: 33.2 G/DL (ref 32–36)
MCV RBC AUTO: 83 FL (ref 80–100)
MONOCYTES # BLD AUTO: 0.45 X10*3/UL (ref 0.1–1)
MONOCYTES NFR BLD AUTO: 11.2 %
NEUTROPHILS # BLD AUTO: 2.02 X10*3/UL (ref 1.2–7.7)
NEUTROPHILS NFR BLD AUTO: 50.5 %
NRBC BLD-RTO: 0 /100 WBCS (ref 0–0)
PLATELET # BLD AUTO: 149 X10*3/UL (ref 150–450)
POTASSIUM SERPL-SCNC: 4.2 MMOL/L (ref 3.5–5.3)
PROT SERPL-MCNC: 6.5 G/DL (ref 6.4–8.2)
RBC # BLD AUTO: 3.17 X10*6/UL (ref 4–5.2)
RETICS #: 0.18 X10*6/UL (ref 0.02–0.08)
RETICS/RBC NFR AUTO: 5.8 % (ref 0.5–2)
SODIUM SERPL-SCNC: 136 MMOL/L (ref 136–145)
WBC # BLD AUTO: 4 X10*3/UL (ref 4.4–11.3)

## 2024-07-06 PROCEDURE — 2500000001 HC RX 250 WO HCPCS SELF ADMINISTERED DRUGS (ALT 637 FOR MEDICARE OP)

## 2024-07-06 PROCEDURE — 99233 SBSQ HOSP IP/OBS HIGH 50: CPT

## 2024-07-06 PROCEDURE — 1170000001 HC PRIVATE ONCOLOGY ROOM DAILY

## 2024-07-06 PROCEDURE — 85025 COMPLETE CBC W/AUTO DIFF WBC: CPT

## 2024-07-06 PROCEDURE — 2500000002 HC RX 250 W HCPCS SELF ADMINISTERED DRUGS (ALT 637 FOR MEDICARE OP, ALT 636 FOR OP/ED)

## 2024-07-06 PROCEDURE — 2500000001 HC RX 250 WO HCPCS SELF ADMINISTERED DRUGS (ALT 637 FOR MEDICARE OP): Performed by: NURSE PRACTITIONER

## 2024-07-06 PROCEDURE — 36415 COLL VENOUS BLD VENIPUNCTURE: CPT

## 2024-07-06 PROCEDURE — 83615 LACTATE (LD) (LDH) ENZYME: CPT

## 2024-07-06 PROCEDURE — 85045 AUTOMATED RETICULOCYTE COUNT: CPT

## 2024-07-06 PROCEDURE — 2500000004 HC RX 250 GENERAL PHARMACY W/ HCPCS (ALT 636 FOR OP/ED): Performed by: NURSE PRACTITIONER

## 2024-07-06 PROCEDURE — 83735 ASSAY OF MAGNESIUM: CPT

## 2024-07-06 PROCEDURE — 80053 COMPREHEN METABOLIC PANEL: CPT

## 2024-07-06 RX ORDER — ALUMINUM HYDROXIDE, MAGNESIUM HYDROXIDE, AND SIMETHICONE 1200; 120; 1200 MG/30ML; MG/30ML; MG/30ML
20 SUSPENSION ORAL 4 TIMES DAILY PRN
Status: DISCONTINUED | OUTPATIENT
Start: 2024-07-06 | End: 2024-07-07 | Stop reason: HOSPADM

## 2024-07-06 RX ORDER — BISACODYL 10 MG/1
10 SUPPOSITORY RECTAL ONCE
Status: COMPLETED | OUTPATIENT
Start: 2024-07-06 | End: 2024-07-06

## 2024-07-06 ASSESSMENT — PAIN SCALES - GENERAL
PAINLEVEL_OUTOF10: 7
PAINLEVEL_OUTOF10: 7
PAINLEVEL_OUTOF10: 6
PAINLEVEL_OUTOF10: 5 - MODERATE PAIN
PAINLEVEL_OUTOF10: 8
PAINLEVEL_OUTOF10: 6
PAINLEVEL_OUTOF10: 7
PAINLEVEL_OUTOF10: 6
PAINLEVEL_OUTOF10: 7
PAINLEVEL_OUTOF10: 7
PAINLEVEL_OUTOF10: 9
PAINLEVEL_OUTOF10: 7
PAINLEVEL_OUTOF10: 6
PAINLEVEL_OUTOF10: 7

## 2024-07-06 ASSESSMENT — COGNITIVE AND FUNCTIONAL STATUS - GENERAL
DAILY ACTIVITIY SCORE: 24
DAILY ACTIVITIY SCORE: 24
MOBILITY SCORE: 24
MOBILITY SCORE: 24

## 2024-07-06 ASSESSMENT — PAIN DESCRIPTION - ORIENTATION
ORIENTATION: RIGHT

## 2024-07-06 ASSESSMENT — PAIN - FUNCTIONAL ASSESSMENT
PAIN_FUNCTIONAL_ASSESSMENT: 0-10

## 2024-07-06 ASSESSMENT — PAIN DESCRIPTION - LOCATION
LOCATION: SHOULDER

## 2024-07-06 ASSESSMENT — PAIN SCALES - PAIN ASSESSMENT IN ADVANCED DEMENTIA (PAINAD): TOTALSCORE: MEDICATION (SEE MAR)

## 2024-07-06 NOTE — CARE PLAN
Problem: Pain  Goal: Takes deep breaths with improved pain control throughout the shift  Outcome: Progressing  Goal: Turns in bed with improved pain control throughout the shift  Outcome: Progressing  Goal: Walks with improved pain control throughout the shift  Outcome: Progressing  Goal: Performs ADL's with improved pain control throughout shift  Outcome: Progressing  Goal: Participates in PT with improved pain control throughout the shift  Outcome: Progressing  Goal: Free from opioid side effects throughout the shift  Outcome: Progressing  Goal: Free from acute confusion related to pain meds throughout the shift  Outcome: Progressing     Problem: Pain - Adult  Goal: Verbalizes/displays adequate comfort level or baseline comfort level  Outcome: Progressing     Problem: Safety - Adult  Goal: Free from fall injury  Outcome: Progressing     Problem: Discharge Planning  Goal: Discharge to home or other facility with appropriate resources  Outcome: Progressing     Problem: Chronic Conditions and Co-morbidities  Goal: Patient's chronic conditions and co-morbidity symptoms are monitored and maintained or improved  Outcome: Progressing       The clinical goals for the shift include pt will rate pain below 7/10 throughout shift    Over the shift, the patient remained safe and free from injury. Pain in R shoulder, medicated PRN. Vitals stable throughout shift. No acute events overnight

## 2024-07-06 NOTE — PROGRESS NOTES
Sonja Dallas is a 63 y.o. female on day 2 of admission presenting with Sickle cell anemia with pain (Multi).    Subjective   Seen at bedside this AM. Pt states she is feeling even better today, and endorsed increased ROM when moving her R shoulder. Endorsing 7/10 pain located in R shoulder and R arm. Discussed with pt possible discharge, and pt stated she would feel more comfortable leaving tomorrow then later today. Pt also endorsed last BM yesterday, 7/5; discussed adding another suppository for today. Denies any HA, dizziness/lightheadedness, fevers/chills, cough, congestion, rhinorrhea, sore throat, SOB, CP, palpitations, abdominal pain, n/v/d/c, melena, dysuria, urgency/frequency, hematuria, numbness/tingling, bruising/bleeding or rashes. Denies any sick contacts. ROS otherwise negative.     Objective     Physical Exam  Vitals reviewed.   Constitutional:       Appearance: Normal appearance.   HENT:      Head: Normocephalic and atraumatic.      Nose: Nose normal.      Mouth/Throat:      Mouth: Mucous membranes are moist.      Pharynx: Oropharynx is clear.   Eyes:      Extraocular Movements: Extraocular movements intact.      Pupils: Pupils are equal, round, and reactive to light.   Cardiovascular:      Rate and Rhythm: Normal rate and regular rhythm.      Pulses: Normal pulses.      Heart sounds: Normal heart sounds.   Pulmonary:      Effort: Pulmonary effort is normal.      Breath sounds: Normal breath sounds.   Abdominal:      General: Bowel sounds are normal.      Palpations: Abdomen is soft.   Musculoskeletal:         General: Normal range of motion.   Skin:     General: Skin is warm.   Neurological:      General: No focal deficit present.      Mental Status: She is alert and oriented to person, place, and time. Mental status is at baseline.   Psychiatric:         Mood and Affect: Mood normal.         Behavior: Behavior normal.       Last Recorded Vitals  Blood pressure 96/58, pulse 83, temperature 36.4  °C (97.5 °F), temperature source Temporal, resp. rate 18, SpO2 98%.  Intake/Output last 3 Shifts:  No intake/output data recorded.        Assessment/Plan   Principal Problem:    Sickle cell anemia with pain (Multi)  Active Problems:    Sickle cell crisis (Multi)  Sonja Dallas is a 63 y.o. female with a PMHx of sickle beta plus thalassemia (HgbSB+), R+L hip AVN s/p R hip replacement 2007, lung adenocarcinoma (dx 2012, s/p lobectomy, no chemo no XRT at [facility], followed by Dr. Chamorro), HTN, TTP (2004), who presented to [facility] 7/2 for uncontrolled pain in R shoulder. Received 0.4mg subcutaneous dilaudid x 3 doses without adequate pain control, admitted for further management. Started on IVP dilaudid, began rotating IVP dilaudid and oxycodone 7/5. DC pending pain control, most likely 7/7.      # Sickle Cell Disease with pain  - OARRS reviewed, last filled oxycodone 5mg #60 tabs on 6/6/24   - No carepath   - Hgb baseline ~10; 10.4 on admit, no need for simple transfusion at this time. Lysis labs at baseline   - no indication for CXR at time of admit, monitor for CP or resp symptoms / signs of infection   - S/p (7/2-7/5) dilaudid 0.6mg IVP q2h on admit for pain management  - 7/5 start rotating IVP dilaudid 0.6mg q4h + oxycodone 10mg q4h with plan to rotate q2h PRN   - started voltaren gel PRN 7/4   - Hgb S (7/2):  71.4%  - Utox (7/2) appropriately positive for cannabinoids (prescribed medical marijuana)  - Continue home folic acid, iron, vitamin D, MV daily   - continue home fioricet for headaches PRN   - Bowel regimen for opioid induced constipation with DocuSenna tabs BID and Miralax daily PRN  - PO Benadryl PRN for opioid-induced pruritus, PO Zofran PRN for opioid-induced nausea     # Chronic Hypokalemia  - on daily Kcl supplementation at home   - K 2.7 in ACC, s/p repletion; patient admits to missing 1-2 days doses. Adequate K as of 7/4  - continue home 20mEq Kcl daily     # Anxiety / Depression  - continue home  wellbutrin 300mg daily      # HTN  - continue home maxzide 75-50mg tab      DVT prophy: Lovenox SQ, SCDs, encourage ambulation     DISPO:  - Full Code  - DC pending improvement in pain  - FUV: Esophagram and Ophthal 7/15, Sickle Cell 7/29     I spent 60 minutes in the professional and overall care of this patient.    Kyara Jaquez, APRN-CNP

## 2024-07-07 ENCOUNTER — PHARMACY VISIT (OUTPATIENT)
Dept: PHARMACY | Facility: CLINIC | Age: 64
End: 2024-07-07
Payer: COMMERCIAL

## 2024-07-07 VITALS
RESPIRATION RATE: 18 BRPM | TEMPERATURE: 97.7 F | DIASTOLIC BLOOD PRESSURE: 69 MMHG | HEART RATE: 77 BPM | SYSTOLIC BLOOD PRESSURE: 112 MMHG | OXYGEN SATURATION: 99 %

## 2024-07-07 LAB
ALBUMIN SERPL BCP-MCNC: 3.7 G/DL (ref 3.4–5)
ALP SERPL-CCNC: 65 U/L (ref 33–136)
ALT SERPL W P-5'-P-CCNC: 12 U/L (ref 7–45)
ANION GAP SERPL CALC-SCNC: 13 MMOL/L (ref 10–20)
AST SERPL W P-5'-P-CCNC: 23 U/L (ref 9–39)
BASOPHILS # BLD AUTO: 0.02 X10*3/UL (ref 0–0.1)
BASOPHILS NFR BLD AUTO: 0.5 %
BILIRUB SERPL-MCNC: 1 MG/DL (ref 0–1.2)
BUN SERPL-MCNC: 13 MG/DL (ref 6–23)
CALCIUM SERPL-MCNC: 9.5 MG/DL (ref 8.6–10.6)
CHLORIDE SERPL-SCNC: 99 MMOL/L (ref 98–107)
CO2 SERPL-SCNC: 28 MMOL/L (ref 21–32)
CREAT SERPL-MCNC: 1 MG/DL (ref 0.5–1.05)
EGFRCR SERPLBLD CKD-EPI 2021: 64 ML/MIN/1.73M*2
EOSINOPHIL # BLD AUTO: 0.29 X10*3/UL (ref 0–0.7)
EOSINOPHIL NFR BLD AUTO: 6.7 %
ERYTHROCYTE [DISTWIDTH] IN BLOOD BY AUTOMATED COUNT: 14.9 % (ref 11.5–14.5)
GLUCOSE SERPL-MCNC: 88 MG/DL (ref 74–99)
HCT VFR BLD AUTO: 24.6 % (ref 36–46)
HGB BLD-MCNC: 8.1 G/DL (ref 12–16)
HGB RETIC QN: 28 PG (ref 28–38)
IMM GRANULOCYTES # BLD AUTO: 0.03 X10*3/UL (ref 0–0.7)
IMM GRANULOCYTES NFR BLD AUTO: 0.7 % (ref 0–0.9)
IMMATURE RETIC FRACTION: 35.5 %
LDH SERPL L TO P-CCNC: 178 U/L (ref 84–246)
LYMPHOCYTES # BLD AUTO: 0.99 X10*3/UL (ref 1.2–4.8)
LYMPHOCYTES NFR BLD AUTO: 22.8 %
MAGNESIUM SERPL-MCNC: 2.12 MG/DL (ref 1.6–2.4)
MCH RBC QN AUTO: 27.3 PG (ref 26–34)
MCHC RBC AUTO-ENTMCNC: 32.9 G/DL (ref 32–36)
MCV RBC AUTO: 83 FL (ref 80–100)
MONOCYTES # BLD AUTO: 0.54 X10*3/UL (ref 0.1–1)
MONOCYTES NFR BLD AUTO: 12.4 %
NEUTROPHILS # BLD AUTO: 2.47 X10*3/UL (ref 1.2–7.7)
NEUTROPHILS NFR BLD AUTO: 56.9 %
NRBC BLD-RTO: 0 /100 WBCS (ref 0–0)
PLATELET # BLD AUTO: 156 X10*3/UL (ref 150–450)
POTASSIUM SERPL-SCNC: 4.3 MMOL/L (ref 3.5–5.3)
PROT SERPL-MCNC: 6.3 G/DL (ref 6.4–8.2)
RBC # BLD AUTO: 2.97 X10*6/UL (ref 4–5.2)
RETICS #: 0.17 X10*6/UL (ref 0.02–0.08)
RETICS/RBC NFR AUTO: 5.8 % (ref 0.5–2)
SODIUM SERPL-SCNC: 136 MMOL/L (ref 136–145)
WBC # BLD AUTO: 4.3 X10*3/UL (ref 4.4–11.3)

## 2024-07-07 PROCEDURE — 2500000001 HC RX 250 WO HCPCS SELF ADMINISTERED DRUGS (ALT 637 FOR MEDICARE OP): Performed by: NURSE PRACTITIONER

## 2024-07-07 PROCEDURE — 85025 COMPLETE CBC W/AUTO DIFF WBC: CPT

## 2024-07-07 PROCEDURE — 83615 LACTATE (LD) (LDH) ENZYME: CPT

## 2024-07-07 PROCEDURE — RXMED WILLOW AMBULATORY MEDICATION CHARGE

## 2024-07-07 PROCEDURE — 2500000004 HC RX 250 GENERAL PHARMACY W/ HCPCS (ALT 636 FOR OP/ED): Performed by: NURSE PRACTITIONER

## 2024-07-07 PROCEDURE — 2500000001 HC RX 250 WO HCPCS SELF ADMINISTERED DRUGS (ALT 637 FOR MEDICARE OP)

## 2024-07-07 PROCEDURE — 84075 ASSAY ALKALINE PHOSPHATASE: CPT

## 2024-07-07 PROCEDURE — 2500000002 HC RX 250 W HCPCS SELF ADMINISTERED DRUGS (ALT 637 FOR MEDICARE OP, ALT 636 FOR OP/ED)

## 2024-07-07 PROCEDURE — 99239 HOSP IP/OBS DSCHRG MGMT >30: CPT

## 2024-07-07 PROCEDURE — 83735 ASSAY OF MAGNESIUM: CPT

## 2024-07-07 PROCEDURE — 36415 COLL VENOUS BLD VENIPUNCTURE: CPT

## 2024-07-07 PROCEDURE — 85045 AUTOMATED RETICULOCYTE COUNT: CPT

## 2024-07-07 RX ORDER — OXYCODONE HYDROCHLORIDE 5 MG/1
TABLET ORAL
Qty: 40 TABLET | Refills: 0 | Status: SHIPPED | OUTPATIENT
Start: 2024-07-07 | End: 2024-07-07

## 2024-07-07 RX ORDER — OXYCODONE HYDROCHLORIDE 5 MG/1
10 TABLET ORAL EVERY 6 HOURS PRN
Qty: 20 TABLET | Refills: 0 | Status: SHIPPED | OUTPATIENT
Start: 2024-07-07 | End: 2024-07-07 | Stop reason: HOSPADM

## 2024-07-07 RX ORDER — OXYCODONE HYDROCHLORIDE 10 MG/1
10 TABLET ORAL EVERY 6 HOURS PRN
Qty: 20 TABLET | Refills: 0 | Status: SHIPPED | OUTPATIENT
Start: 2024-07-07 | End: 2024-07-14

## 2024-07-07 ASSESSMENT — PAIN SCALES - GENERAL
PAINLEVEL_OUTOF10: 8
PAINLEVEL_OUTOF10: 9
PAINLEVEL_OUTOF10: 5 - MODERATE PAIN
PAINLEVEL_OUTOF10: 9
PAINLEVEL_OUTOF10: 9

## 2024-07-07 ASSESSMENT — COGNITIVE AND FUNCTIONAL STATUS - GENERAL
DAILY ACTIVITIY SCORE: 24
MOBILITY SCORE: 24

## 2024-07-07 ASSESSMENT — PAIN - FUNCTIONAL ASSESSMENT
PAIN_FUNCTIONAL_ASSESSMENT: 0-10
PAIN_FUNCTIONAL_ASSESSMENT: 0-10

## 2024-07-07 NOTE — DISCHARGE SUMMARY
Discharge Diagnosis  Sickle cell anemia with pain (Multi)    Issues Requiring Follow-Up  SCD    Test Results Pending At Discharge  Pending Labs       Order Current Status    Comprehensive Metabolic Panel In process    Lactate Dehydrogenase In process    Magnesium In process            Hospital Course   Sonja Dallas is a 63 y.o. female with a PMHx of sickle beta plus thalassemia (HgbSB+), R+L hip AVN s/p R hip replacement 2007, lung adenocarcinoma (dx 2012, s/p lobectomy, no chemo no XRT at [facility], followed by Dr. Chamorro), HTN, TTP (2004), who presented to [facility] 7/2 for uncontrolled pain in R shoulder typical of acute on chronic sickle cell pain. S/p  0.4mg subcutaneous dilaudid x 3 doses in [facility] without adequate pain control, admitted for further management. Started on IVP dilaudid (7/2-7/5), began rotating IVP dilaudid and oxycodone (7/5-7/7). Utox (7/2) appropriately positive for cannabinoids (prescribed medical marijuana). On DOD, pt reports pain improved significantly and is hemodynamically stable. She states she feels she can control her pain at home on 5mg-10mg dosing of oxycodone. DC home no needs. VSS and hemolysis labs at baseline.    FUV Esophagram and Ophthal 7/15, Sickle Cell 7/29. MTB sent 5mg oxy tabs x5 days. Pt declines needing refills of other medications at this time. Refill line # provided.     Patient discharged in stable condition. > 30 minutes spent on discharge planning.    Assessment and discharge plan discussed with attending physician Dr. Pascal.       Pertinent Physical Exam At Time of Discharge  Physical Exam  Constitutional:       Appearance: Normal appearance.   HENT:      Head: Normocephalic.      Right Ear: External ear normal.      Left Ear: External ear normal.      Nose: Nose normal.   Eyes:      Extraocular Movements: Extraocular movements intact.      Conjunctiva/sclera: Conjunctivae normal.      Pupils: Pupils are equal, round, and reactive to light.   Cardiovascular:      Rate and  Rhythm: Normal rate and regular rhythm.   Pulmonary:      Effort: Pulmonary effort is normal.      Breath sounds: Normal breath sounds.   Abdominal:      Palpations: Abdomen is soft.   Musculoskeletal:         General: Normal range of motion.      Cervical back: Normal range of motion and neck supple.   Skin:     General: Skin is warm and dry.   Neurological:      Mental Status: She is alert and oriented to person, place, and time. Mental status is at baseline.   Psychiatric:         Mood and Affect: Mood normal.         Behavior: Behavior normal.         Thought Content: Thought content normal.         Judgment: Judgment normal.         Home Medications     Medication List      CONTINUE taking these medications     acetaminophen 500 mg tablet; Commonly known as: Tylenol   buPROPion  mg 24 hr tablet; Commonly known as: Wellbutrin XL; Take   1 tablet (300 mg) by mouth once daily.   butalbital-acetaminophen-caff -40 mg capsule; Commonly known as:   Fioricet   ferrous sulfate (325 mg ferrous sulfate) tablet   FLAXSEED OIL ORAL   folic acid 1 mg tablet; Commonly known as: Folvite   MULTIPLE VITAMINS ORAL   naloxone 4 mg/0.1 mL nasal spray; Commonly known as: Narcan; Administer   1 spray (4 mg) into affected nostril(s) if needed for opioid reversal. May   repeat every 2-3 minutes if needed, alternating nostrils, until medical   assistance becomes available.   omeprazole 40 mg DR capsule; Commonly known as: PriLOSEC; Take 1 capsule   (40 mg) by mouth once daily in the morning.   oxyCODONE 5 mg immediate release tablet; Commonly known as: Roxicodone;   Take 1 tablet (5 mg) by mouth every 6 hours if needed for severe pain (7 -   10).   potassium chloride CR 20 mEq ER tablet; Commonly known as: Klor-Con M20;   Take 2 tablets (40 mEq) by mouth once daily.   triamterene-hydrochlorothiazid 75-50 mg tablet; Commonly known as:   Maxzide; Take 1 tablet by mouth once daily.   vitamin A 2,400 mcg capsule   Vitamin D3 10  MCG (400 UNIT) tablet; Generic drug: cholecalciferol   VITAMIN E ACETATE ORAL       Outpatient Follow-Up  Future Appointments   Date Time Provider Department Center   7/15/2024  9:30 AM [facility] FLUORO 10 CMCDR [facility]   7/15/2024  1:00 PM Leonor Tinoco MD [id number] [facility]   7/22/2024 10:05 AM [facility] GI MOT/MINOR [id number] [facility]   7/29/2024 12:00 PM Oleg Pascal MD [id number] [facility]   9/23/2024  8:30 AM Leonor Tinoco MD [id number] [facility]       Caterina Suh, APRN-CNP

## 2024-07-12 ENCOUNTER — APPOINTMENT (OUTPATIENT)
Dept: PRIMARY CARE | Facility: CLINIC | Age: 64
End: 2024-07-12
Payer: COMMERCIAL

## 2024-07-15 ENCOUNTER — HOSPITAL ENCOUNTER (OUTPATIENT)
Facility: CLINIC | Age: 64
Setting detail: OUTPATIENT SURGERY
End: 2024-07-15
Attending: OPHTHALMOLOGY | Admitting: OPHTHALMOLOGY
Payer: COMMERCIAL

## 2024-07-15 ENCOUNTER — APPOINTMENT (OUTPATIENT)
Dept: RADIOLOGY | Facility: HOSPITAL | Age: 64
End: 2024-07-15
Payer: COMMERCIAL

## 2024-07-15 ENCOUNTER — APPOINTMENT (OUTPATIENT)
Dept: OPHTHALMOLOGY | Facility: CLINIC | Age: 64
End: 2024-07-15
Payer: COMMERCIAL

## 2024-07-15 DIAGNOSIS — H25.812 COMBINED FORM OF AGE-RELATED CATARACT, LEFT EYE: Primary | ICD-10-CM

## 2024-07-15 DIAGNOSIS — H25.813 COMBINED FORMS OF AGE-RELATED CATARACT, BILATERAL: ICD-10-CM

## 2024-07-15 PROCEDURE — 92134 CPTRZ OPH DX IMG PST SGM RTA: CPT | Performed by: OPHTHALMOLOGY

## 2024-07-15 PROCEDURE — 92136 OPHTHALMIC BIOMETRY: CPT | Mod: BILATERAL PROCEDURE | Performed by: OPHTHALMOLOGY

## 2024-07-15 PROCEDURE — 99213 OFFICE O/P EST LOW 20 MIN: CPT | Performed by: OPHTHALMOLOGY

## 2024-07-15 PROCEDURE — 92136 OPHTHALMIC BIOMETRY: CPT | Performed by: OPHTHALMOLOGY

## 2024-07-15 RX ORDER — PHENYLEPHRINE HYDROCHLORIDE 25 MG/ML
1 SOLUTION/ DROPS OPHTHALMIC
OUTPATIENT
Start: 2024-07-15 | End: 2024-07-15

## 2024-07-15 RX ORDER — PREDNISOLONE ACETATE 10 MG/ML
1 SUSPENSION/ DROPS OPHTHALMIC 4 TIMES DAILY
Qty: 5 ML | Refills: 1 | Status: SHIPPED | OUTPATIENT
Start: 2024-07-15 | End: 2024-07-23

## 2024-07-15 RX ORDER — TROPICAMIDE 10 MG/ML
1 SOLUTION/ DROPS OPHTHALMIC
OUTPATIENT
Start: 2024-07-15 | End: 2024-07-15

## 2024-07-15 RX ORDER — OFLOXACIN 3 MG/ML
1 SOLUTION/ DROPS OPHTHALMIC 4 TIMES DAILY
Qty: 5 ML | Refills: 1 | Status: SHIPPED | OUTPATIENT
Start: 2024-07-15 | End: 2024-07-23

## 2024-07-15 RX ORDER — TETRACAINE HYDROCHLORIDE 5 MG/ML
1 SOLUTION OPHTHALMIC ONCE
OUTPATIENT
Start: 2024-07-15 | End: 2024-07-15

## 2024-07-15 ASSESSMENT — ENCOUNTER SYMPTOMS
EYES NEGATIVE: 0
MUSCULOSKELETAL NEGATIVE: 0
NEUROLOGICAL NEGATIVE: 0
PSYCHIATRIC NEGATIVE: 0
ENDOCRINE NEGATIVE: 0
CARDIOVASCULAR NEGATIVE: 0
RESPIRATORY NEGATIVE: 0
CONSTITUTIONAL NEGATIVE: 0
GASTROINTESTINAL NEGATIVE: 0
ALLERGIC/IMMUNOLOGIC NEGATIVE: 0
HEMATOLOGIC/LYMPHATIC NEGATIVE: 0

## 2024-07-15 ASSESSMENT — VISUAL ACUITY
METHOD: SNELLEN - LINEAR
CORRECTION_TYPE: GLASSES
OS_CC: 20/400
OD_BAT_MED: 20/50
OD_CC: 20/30
OD_CC+: -2

## 2024-07-15 ASSESSMENT — CUP TO DISC RATIO
OD_RATIO: .1
OS_RATIO: .1

## 2024-07-15 ASSESSMENT — TONOMETRY
IOP_METHOD: GOLDMANN APPLANATION
OS_IOP_MMHG: 14
OD_IOP_MMHG: 14

## 2024-07-15 ASSESSMENT — CONF VISUAL FIELD
OS_SUPERIOR_NASAL_RESTRICTION: 0
OD_SUPERIOR_NASAL_RESTRICTION: 0
OD_SUPERIOR_TEMPORAL_RESTRICTION: 0
OD_INFERIOR_NASAL_RESTRICTION: 0
OS_INFERIOR_NASAL_RESTRICTION: 0
OD_INFERIOR_TEMPORAL_RESTRICTION: 0
OS_INFERIOR_TEMPORAL_RESTRICTION: 0
OS_NORMAL: 1
OS_SUPERIOR_TEMPORAL_RESTRICTION: 0
OD_NORMAL: 1

## 2024-07-15 ASSESSMENT — EXTERNAL EXAM - LEFT EYE: OS_EXAM: NORMAL

## 2024-07-15 ASSESSMENT — REFRACTION_MANIFEST
OS_SPHERE: +1.25
OD_CYLINDER: SPHERE
OS_CYLINDER: -1.75
OD_SPHERE: +2.00
OS_AXIS: 180
OS_ADD: +2.25
OD_ADD: +2.25

## 2024-07-15 ASSESSMENT — REFRACTION_WEARINGRX
OS_CYLINDER: -1.75
OS_SPHERE: +1.25
OD_ADD: +2.25
OS_AXIS: 180
OS_ADD: +2.25
OD_SPHERE: +2.00

## 2024-07-15 ASSESSMENT — EXTERNAL EXAM - RIGHT EYE: OD_EXAM: NORMAL

## 2024-07-15 NOTE — PROGRESS NOTES
Assessment/Plan   Diagnoses and all orders for this visit:  Combined form of age-related cataract, left eye  -     OCT, Retina - OU - Both Eyes  -     IOL Biometry - OU - Both Eyes  -     Case Request Operating Room: Extraction Cataract with Insertion Intraocular Lens; Standing  Other orders  -     Place in outpatient/hospital ambulatory surgery; Standing     Visually significant cataract OS. symptoms: blurry vision, glare.     Indication for cataract surgery: Input To potentially improve visual acuity and improve quality of life/reduce symptoms.   Based on a comprehensive eye exam performed today, a visually significant cataract appears to be the source of decreased vision, diminished quality of life, and impairment of activities of daily living.   Discussed option of cataract surgery vs observation. Patient  wishes to have cataract surgery at this time.   Discussed surgical procedure with patient.   As a result of cataract extraction, it is believed that the patient will experience improved vision. Discussed potential risks, benefits, and complications of cataract surgery including but not limited to pain, bleeding, infection, inflammation, edema, increased eye pressure, retinal tear/detachment, lens dislocation, ptosis, iris damage, need for additional surgery, need for glasses after surgery, loss of vision/loss of eye. Patient understands and wishes to proceed. All questions were answered.   Will schedule cataract surgery OS.   Discussed IOL options (standard monofocal, monofocal with monovision, toric, multifocal).   Lens chosen: standard monofocal. Defer/decline toric/multifocal lens at this time.   Dominance:   Special considerations: rakesh  Best contact number: [phone]  Drops:   -Ketorolac and Ofloxacin 4x/day starting 1 day prior to surgery; Prednisolone acetate 1% 4x/day starting after surgery   Discussed that may potentially need glasses for best vision both at distance and at near.    I personally  reviewed the lenstar measurements and will choose the lens accordingly.   Significant glare test OD, will discuss poss surg p cataract extraction (CE) OS

## 2024-07-16 ENCOUNTER — HOSPITAL ENCOUNTER (OUTPATIENT)
Dept: RADIOLOGY | Facility: HOSPITAL | Age: 64
Discharge: HOME | End: 2024-07-16
Payer: COMMERCIAL

## 2024-07-16 DIAGNOSIS — K44.9 HIATAL HERNIA: ICD-10-CM

## 2024-07-16 PROCEDURE — 74220 X-RAY XM ESOPHAGUS 1CNTRST: CPT | Performed by: RADIOLOGY

## 2024-07-16 PROCEDURE — 2500000001 HC RX 250 WO HCPCS SELF ADMINISTERED DRUGS (ALT 637 FOR MEDICARE OP): Performed by: SURGERY

## 2024-07-16 PROCEDURE — 74220 X-RAY XM ESOPHAGUS 1CNTRST: CPT

## 2024-07-16 PROCEDURE — 2500000005 HC RX 250 GENERAL PHARMACY W/O HCPCS: Performed by: SURGERY

## 2024-07-16 PROCEDURE — A9698 NON-RAD CONTRAST MATERIALNOC: HCPCS | Performed by: SURGERY

## 2024-07-21 ENCOUNTER — ANESTHESIA EVENT (OUTPATIENT)
Dept: GASTROENTEROLOGY | Facility: HOSPITAL | Age: 64
End: 2024-07-21

## 2024-07-22 ENCOUNTER — ANESTHESIA (OUTPATIENT)
Dept: GASTROENTEROLOGY | Facility: HOSPITAL | Age: 64
End: 2024-07-22
Payer: COMMERCIAL

## 2024-07-23 ENCOUNTER — TELEPHONE (OUTPATIENT)
Dept: SURGERY | Facility: HOSPITAL | Age: 64
End: 2024-07-23
Payer: COMMERCIAL

## 2024-07-23 NOTE — TELEPHONE ENCOUNTER
Discussed results of barium swallow.  She is scheduled for manometry in August. She is thinking of surgery in December or next year.  Again it depends on her manometry also

## 2024-07-24 ENCOUNTER — OFFICE VISIT (OUTPATIENT)
Dept: PRIMARY CARE | Facility: CLINIC | Age: 64
End: 2024-07-24
Payer: COMMERCIAL

## 2024-07-24 VITALS
TEMPERATURE: 98.7 F | OXYGEN SATURATION: 98 % | HEART RATE: 99 BPM | BODY MASS INDEX: 25.4 KG/M2 | HEIGHT: 64 IN | DIASTOLIC BLOOD PRESSURE: 73 MMHG | WEIGHT: 148.8 LBS | SYSTOLIC BLOOD PRESSURE: 117 MMHG

## 2024-07-24 DIAGNOSIS — J02.9 SORE THROAT: Primary | ICD-10-CM

## 2024-07-24 LAB — POC RAPID STREP: NEGATIVE

## 2024-07-24 PROCEDURE — 87635 SARS-COV-2 COVID-19 AMP PRB: CPT

## 2024-07-24 PROCEDURE — 87880 STREP A ASSAY W/OPTIC: CPT | Performed by: STUDENT IN AN ORGANIZED HEALTH CARE EDUCATION/TRAINING PROGRAM

## 2024-07-24 PROCEDURE — 3008F BODY MASS INDEX DOCD: CPT | Performed by: STUDENT IN AN ORGANIZED HEALTH CARE EDUCATION/TRAINING PROGRAM

## 2024-07-24 PROCEDURE — 3074F SYST BP LT 130 MM HG: CPT | Performed by: STUDENT IN AN ORGANIZED HEALTH CARE EDUCATION/TRAINING PROGRAM

## 2024-07-24 PROCEDURE — 3078F DIAST BP <80 MM HG: CPT | Performed by: STUDENT IN AN ORGANIZED HEALTH CARE EDUCATION/TRAINING PROGRAM

## 2024-07-24 PROCEDURE — 99214 OFFICE O/P EST MOD 30 MIN: CPT | Performed by: STUDENT IN AN ORGANIZED HEALTH CARE EDUCATION/TRAINING PROGRAM

## 2024-07-24 NOTE — LETTER
July 29, 2024     Patient: Sonja Dallas   YOB: 1960   Date of Visit: 7/24/2024       To Whom It May Concern:    Sonja Dallas was seen in my clinic on 7/24/2024 at 3:30 pm. Please excuse Sonja for her absence from work on this day to make the appointment. Sonja may return to work 7/31/2024.    If you have any questions or concerns, please don't hesitate to call [phone].         Sincerely,         Bharti Romero MD        CC: No Recipients

## 2024-07-24 NOTE — PROGRESS NOTES
"Subjective   Patient ID: Sonja Dallas is a 64 y.o. female who presents for Follow-up (Sore throat and hurt back on the job on Friday. ).        HPI      Woke up with a sore throat   Ha+ , body ache +   \" Feels like flu\"               Visit Vitals  /73   Pulse 99   Temp 37.1 °C (98.7 °F) (Oral)   Ht 1.626 m (5' 4\")   Wt 67.5 kg (148 lb 12.8 oz)   LMP  (LMP Unknown)   SpO2 98%   BMI 25.54 kg/m²   OB Status Postmenopausal   Smoking Status Some Days   BSA 1.75 m²      No LMP recorded (lmp unknown). Patient is postmenopausal.   Current Outpatient Medications   Medication Instructions    acetaminophen (Tylenol) 500 mg tablet 1 tablet, oral, Every 6 hours PRN    buPROPion XL (WELLBUTRIN XL) 300 mg, oral, Daily    cholecalciferol (Vitamin D3) 10 MCG (400 UNIT) tablet 1 tablet, oral, Daily    ferrous sulfate, 325 mg ferrous sulfate, tablet 1 tablet, oral, Daily    FLAXSEED OIL ORAL Take with water in when needed    folic acid (Folvite) 1 mg tablet 1 tablet, oral, Daily    multivitamin (MULTIPLE VITAMINS ORAL) oral, Daily RT    naloxone (NARCAN) 4 mg, nasal, As needed, May repeat every 2-3 minutes if needed, alternating nostrils, until medical assistance becomes available.    omeprazole (PRILOSEC) 40 mg, oral, Every morning    potassium chloride CR 20 mEq ER tablet 40 mEq, oral, Daily    triamterene-hydrochlorothiazid (Maxzide) 75-50 mg tablet 1 tablet, oral, Daily    vitamin A 8,000 Units, oral, Every other day    VITAMIN E ACETATE ORAL 1 tablet, oral, Daily      Social History     Tobacco Use    Smoking status: Some Days     Types: Cigarettes     Passive exposure: Past    Smokeless tobacco: Never   Substance Use Topics    Alcohol use: Yes     Comment: Occasionally        Review of Systems    As noted in HPI     All other systems have been reviewed and are negative for complaint       Physical Exam    Constitutional : Vitals reviewed. Alert and in no distress  ENT : erytheamtous appearing oropharynx, " .  Cardiovascular : RRR, Normal S1, S2, No pericardial rub/ gallop, no peripheral edema   Pulmonary: No respiratory distress, CTAB   .  Neurologic : CNs 2-12 grossly intact , no obvious FNDs  Psych : A,Ox3, normal mood and affect      Assessment/Plan   Diagnoses and all orders for this visit:  Sore throat  -     POCT Rapid Strep A manually resulted  -     Sars-CoV-2 PCR; Future      Sore throat with body aches   Testing as above     Pt advised to RTO for her other concerns  ( injury at work and paperwork completion ) since this was a sick visit that was scheduled this morning                 Conditions addressed and mgmt as noted above.  Pertinent labs, images/ imaging reports , chart review was done .   Age appropriate labs / labs for mgmt of chronic medical conditions ordered, further mgmt pending the results.

## 2024-07-25 DIAGNOSIS — Z20.822 SUSPECTED COVID-19 VIRUS INFECTION: Primary | ICD-10-CM

## 2024-07-25 LAB — SARS-COV-2 RNA RESP QL NAA+PROBE: NOT DETECTED

## 2024-07-25 NOTE — PROGRESS NOTES
Patient called the office requesting treatment.  I returned her call and left a voicemail and was able to speak to her on the repeat call.  She was seen in the office yesterday with symptom onset less than 24 hours and was swabbed for COVID and strep in the office.  Both of them resulted negative.  Today she reports that she feels like flu symptoms and headache and bodyaches are the predominant symptoms.    Given her symptoms and the recent increase in incidence of COVID-19 infections, I discussed treating her with Paxlovid and patient was agreeable.  Treatment sent.    Symptomatic Rx  with analgesics/ antipyretics and decongestants for sx relief.  Sx course, recovery and residual sx  ( lingering cough , fatigue etc.,) discussed.   Paxlovid discussed, MC SEs and to hold statin during duration of rx advised.   Renal function reviewed.  Discussed the sx , course and worsening sx that would need immediate attention requiring ER visit and hospitalization if needed    Discussed COVID-19 Pandemic best practices for avoidance,  wearing mask when leave the home, only leaving home for essential reasons, maintain social distancing, etc.

## 2024-07-26 ENCOUNTER — TELEPHONE (OUTPATIENT)
Dept: PRIMARY CARE | Facility: CLINIC | Age: 64
End: 2024-07-26
Payer: COMMERCIAL

## 2024-08-06 DIAGNOSIS — I10 PRIMARY HYPERTENSION: ICD-10-CM

## 2024-08-06 DIAGNOSIS — F33.1 MODERATE EPISODE OF RECURRENT MAJOR DEPRESSIVE DISORDER (MULTI): ICD-10-CM

## 2024-08-06 RX ORDER — TRIAMTERENE AND HYDROCHLOROTHIAZIDE 75; 50 MG/1; MG/1
1 TABLET ORAL DAILY
Qty: 90 TABLET | Refills: 0 | Status: SHIPPED | OUTPATIENT
Start: 2024-08-06

## 2024-08-06 RX ORDER — BUPROPION HYDROCHLORIDE 300 MG/1
300 TABLET ORAL DAILY
Qty: 90 TABLET | Refills: 0 | Status: SHIPPED | OUTPATIENT
Start: 2024-08-06

## 2024-08-07 VITALS
HEIGHT: 64 IN | WEIGHT: 149.58 LBS | OXYGEN SATURATION: 98 % | BODY MASS INDEX: 25.54 KG/M2 | DIASTOLIC BLOOD PRESSURE: 78 MMHG | RESPIRATION RATE: 16 BRPM | TEMPERATURE: 97.5 F | SYSTOLIC BLOOD PRESSURE: 112 MMHG | HEART RATE: 81 BPM

## 2024-08-07 PROCEDURE — 99284 EMERGENCY DEPT VISIT MOD MDM: CPT

## 2024-08-07 ASSESSMENT — COLUMBIA-SUICIDE SEVERITY RATING SCALE - C-SSRS
1. IN THE PAST MONTH, HAVE YOU WISHED YOU WERE DEAD OR WISHED YOU COULD GO TO SLEEP AND NOT WAKE UP?: NO
2. HAVE YOU ACTUALLY HAD ANY THOUGHTS OF KILLING YOURSELF?: NO
6. HAVE YOU EVER DONE ANYTHING, STARTED TO DO ANYTHING, OR PREPARED TO DO ANYTHING TO END YOUR LIFE?: NO

## 2024-08-07 ASSESSMENT — PAIN DESCRIPTION - PAIN TYPE: TYPE: ACUTE PAIN

## 2024-08-07 ASSESSMENT — PAIN DESCRIPTION - ONSET: ONSET: ONGOING

## 2024-08-07 ASSESSMENT — PAIN DESCRIPTION - LOCATION: LOCATION: BACK

## 2024-08-07 ASSESSMENT — PAIN DESCRIPTION - FREQUENCY: FREQUENCY: CONSTANT/CONTINUOUS

## 2024-08-07 ASSESSMENT — PAIN SCALES - GENERAL: PAINLEVEL_OUTOF10: 7

## 2024-08-07 ASSESSMENT — PAIN DESCRIPTION - ORIENTATION: ORIENTATION: LOWER;LEFT

## 2024-08-07 ASSESSMENT — PAIN - FUNCTIONAL ASSESSMENT: PAIN_FUNCTIONAL_ASSESSMENT: 0-10

## 2024-08-07 ASSESSMENT — PAIN DESCRIPTION - DESCRIPTORS: DESCRIPTORS: PRESSURE;ACHING

## 2024-08-08 ENCOUNTER — APPOINTMENT (OUTPATIENT)
Dept: RADIOLOGY | Facility: HOSPITAL | Age: 64
End: 2024-08-08
Payer: COMMERCIAL

## 2024-08-08 ENCOUNTER — HOSPITAL ENCOUNTER (EMERGENCY)
Facility: HOSPITAL | Age: 64
Discharge: LEFT BEFORE DISCHARGE | End: 2024-08-08
Payer: COMMERCIAL

## 2024-08-08 DIAGNOSIS — S39.012A LUMBAR STRAIN, INITIAL ENCOUNTER: Primary | ICD-10-CM

## 2024-08-08 PROCEDURE — 96372 THER/PROPH/DIAG INJ SC/IM: CPT | Performed by: PHYSICIAN ASSISTANT

## 2024-08-08 PROCEDURE — 72072 X-RAY EXAM THORAC SPINE 3VWS: CPT

## 2024-08-08 PROCEDURE — 72100 X-RAY EXAM L-S SPINE 2/3 VWS: CPT | Mod: FOREIGN READ | Performed by: RADIOLOGY

## 2024-08-08 PROCEDURE — 2500000004 HC RX 250 GENERAL PHARMACY W/ HCPCS (ALT 636 FOR OP/ED): Performed by: PHYSICIAN ASSISTANT

## 2024-08-08 PROCEDURE — 2500000005 HC RX 250 GENERAL PHARMACY W/O HCPCS: Performed by: PHYSICIAN ASSISTANT

## 2024-08-08 PROCEDURE — 72100 X-RAY EXAM L-S SPINE 2/3 VWS: CPT

## 2024-08-08 PROCEDURE — 72070 X-RAY EXAM THORAC SPINE 2VWS: CPT

## 2024-08-08 PROCEDURE — 72072 X-RAY EXAM THORAC SPINE 3VWS: CPT | Mod: FOREIGN READ | Performed by: RADIOLOGY

## 2024-08-08 RX ORDER — LIDOCAINE 560 MG/1
1 PATCH PERCUTANEOUS; TOPICAL; TRANSDERMAL ONCE
Status: DISCONTINUED | OUTPATIENT
Start: 2024-08-08 | End: 2024-08-08 | Stop reason: HOSPADM

## 2024-08-08 RX ORDER — KETOROLAC TROMETHAMINE 30 MG/ML
15 INJECTION, SOLUTION INTRAMUSCULAR; INTRAVENOUS ONCE
Status: COMPLETED | OUTPATIENT
Start: 2024-08-08 | End: 2024-08-08

## 2024-08-08 RX ORDER — CYCLOBENZAPRINE HCL 5 MG
5 TABLET ORAL 3 TIMES DAILY PRN
Qty: 9 TABLET | Refills: 0 | Status: SHIPPED | OUTPATIENT
Start: 2024-08-08 | End: 2024-08-08 | Stop reason: WASHOUT

## 2024-08-08 RX ORDER — LIDOCAINE 560 MG/1
1 PATCH PERCUTANEOUS; TOPICAL; TRANSDERMAL DAILY
Qty: 5 PATCH | Refills: 0 | Status: SHIPPED | OUTPATIENT
Start: 2024-08-08 | End: 2024-08-08 | Stop reason: WASHOUT

## 2024-08-08 RX ORDER — IBUPROFEN 600 MG/1
600 TABLET ORAL EVERY 6 HOURS PRN
Qty: 20 TABLET | Refills: 0 | Status: SHIPPED | OUTPATIENT
Start: 2024-08-08 | End: 2024-08-08 | Stop reason: WASHOUT

## 2024-08-08 RX ORDER — ACETAMINOPHEN 500 MG
1000 TABLET ORAL EVERY 8 HOURS PRN
Qty: 42 TABLET | Refills: 0 | Status: SHIPPED | OUTPATIENT
Start: 2024-08-08 | End: 2024-08-08 | Stop reason: WASHOUT

## 2024-08-08 RX ORDER — ACETAMINOPHEN 325 MG/1
975 TABLET ORAL ONCE
Status: COMPLETED | OUTPATIENT
Start: 2024-08-08 | End: 2024-08-08

## 2024-08-08 RX ADMIN — LIDOCAINE 4% 1 PATCH: 40 PATCH TOPICAL at 02:03

## 2024-08-08 RX ADMIN — KETOROLAC TROMETHAMINE 15 MG: 30 INJECTION, SOLUTION INTRAMUSCULAR at 02:03

## 2024-08-08 RX ADMIN — ACETAMINOPHEN 975 MG: 325 TABLET ORAL at 02:03
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
## 2024-08-12 ENCOUNTER — TELEPHONE (OUTPATIENT)
Dept: PRIMARY CARE | Facility: CLINIC | Age: 64
End: 2024-08-12

## 2024-08-12 ENCOUNTER — APPOINTMENT (OUTPATIENT)
Dept: PRIMARY CARE | Facility: CLINIC | Age: 64
End: 2024-08-12
Payer: COMMERCIAL

## 2024-08-12 VITALS
WEIGHT: 148.8 LBS | HEIGHT: 64 IN | BODY MASS INDEX: 25.4 KG/M2 | SYSTOLIC BLOOD PRESSURE: 124 MMHG | DIASTOLIC BLOOD PRESSURE: 79 MMHG | HEART RATE: 110 BPM | OXYGEN SATURATION: 99 %
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
## 2024-08-12 DIAGNOSIS — Z85.118 H/O MALIGNANT NEOPLASM OF LUNG: ICD-10-CM

## 2024-08-12 DIAGNOSIS — M47.816 OSTEOARTHRITIS OF LUMBAR SPINE, UNSPECIFIED SPINAL OSTEOARTHRITIS COMPLICATION STATUS: Primary | ICD-10-CM

## 2024-08-12 DIAGNOSIS — L73.2 SUPPURATIVE HIDRADENITIS: ICD-10-CM

## 2024-08-12 PROBLEM — F11.93 OPIOID WITHDRAWAL (MULTI): Status: RESOLVED | Noted: 2024-03-11 | Resolved: 2024-08-12

## 2024-08-12 PROCEDURE — 99214 OFFICE O/P EST MOD 30 MIN: CPT | Performed by: STUDENT IN AN ORGANIZED HEALTH CARE EDUCATION/TRAINING PROGRAM

## 2024-08-12 RX ORDER — DOXYCYCLINE 100 MG/1
100 CAPSULE ORAL 2 TIMES DAILY
Qty: 14 CAPSULE | Refills: 0 | Status: SHIPPED | OUTPATIENT
Start: 2024-08-12 | End: 2024-08-19
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
## 2024-08-21 ENCOUNTER — TELEPHONE (OUTPATIENT)
Dept: PRIMARY CARE | Facility: CLINIC | Age: 64
End: 2024-08-21
Payer: COMMERCIAL

## 2024-09-03 ENCOUNTER — APPOINTMENT (OUTPATIENT)
Dept: PRIMARY CARE | Facility: CLINIC | Age: 64
End: 2024-09-03
Payer: COMMERCIAL

## 2024-09-03 ENCOUNTER — NURSE TRIAGE (OUTPATIENT)
Dept: HEMATOLOGY/ONCOLOGY | Facility: HOSPITAL | Age: 64
End: 2024-09-03

## 2024-09-03 DIAGNOSIS — D57.00 SICKLE CELL CRISIS (MULTI): Primary | ICD-10-CM

## 2024-09-03 RX ORDER — OXYCODONE HYDROCHLORIDE 10 MG/1
10 TABLET ORAL EVERY 6 HOURS PRN
Qty: 20 TABLET | Refills: 0 | Status: SHIPPED | OUTPATIENT
Start: 2024-09-03 | End: 2024-09-10

## 2024-09-03 NOTE — TELEPHONE ENCOUNTER
Pt developed URI symptoms today.  She endorses non-productive cough and nasal congestion.  No fevers, chest pain or difficulty breathing. She notes some mild wheezing.  She does not like to use inhaler because it makes her too jittery.   She has not tested for COVID and does not have a test available.    She asks if a z-pack can be prescribed for her?    Additionally, she needs a refill of oxycodone.  Last prescription was written at hospital discharge 7/7 for oxycodone 10mg, 1/2 tab q 8hrs prn #20.  Preferred pharmacy is [facility] in [facility] in [place].

## 2024-09-03 NOTE — TELEPHONE ENCOUNTER
Pt informed that oxycodone refill was sent in and that she should check with her PCP regarding URI symptoms and request for medication.  Pt in agreement.

## 2024-09-04 ENCOUNTER — TELEMEDICINE (OUTPATIENT)
Dept: PRIMARY CARE | Facility: CLINIC | Age: 64
End: 2024-09-04
Payer: COMMERCIAL

## 2024-09-04 ENCOUNTER — LAB (OUTPATIENT)
Dept: LAB | Facility: LAB | Age: 64
End: 2024-09-04
Payer: COMMERCIAL

## 2024-09-04 ENCOUNTER — TELEPHONE (OUTPATIENT)
Dept: HEMATOLOGY/ONCOLOGY | Facility: CLINIC | Age: 64
End: 2024-09-04

## 2024-09-04 ENCOUNTER — SOCIAL WORK (OUTPATIENT)
Dept: HEMATOLOGY/ONCOLOGY | Facility: HOSPITAL | Age: 64
End: 2024-09-04

## 2024-09-04 DIAGNOSIS — R68.89 FLU-LIKE SYMPTOMS: Primary | ICD-10-CM

## 2024-09-04 DIAGNOSIS — R68.89 FLU-LIKE SYMPTOMS: ICD-10-CM

## 2024-09-04 PROCEDURE — 87636 SARSCOV2 & INF A&B AMP PRB: CPT

## 2024-09-04 PROCEDURE — 4004F PT TOBACCO SCREEN RCVD TLK: CPT | Performed by: STUDENT IN AN ORGANIZED HEALTH CARE EDUCATION/TRAINING PROGRAM

## 2024-09-04 PROCEDURE — 99214 OFFICE O/P EST MOD 30 MIN: CPT | Performed by: STUDENT IN AN ORGANIZED HEALTH CARE EDUCATION/TRAINING PROGRAM

## 2024-09-04 NOTE — PROGRESS NOTES
Social Work Note    Referral Source: Patient  Meeting Location: Phone  Person(s) Present: Patient  Identified Needs: Medical Concerns  Impression and Plan: Pt contacted [facility] requesting assistance obtaining medication for current cold symptoms. Pt states she contacted her PCP and was informed she would need to attend a virtual appointment today to discuss symptoms. Additionally, per chart review, Sickle Cell team had redirected pt to PCP. [facility] reviewed this information with patient and encourage pt to attend virtual visit. Pt agreeable. Patient denies any further psychosocial or support service needs at this time. Patient encouraged to contact [facility] if any needs arise.   Interventions Provided: Care Coordination and Empowerment/Coaching  Estimated Time Spent: 5min    SW will continue to follow as needed.

## 2024-09-04 NOTE — PROGRESS NOTES
"Subjective   Patient ID: Sonja Dallas is a 64 y.o. female who presents for Follow-up (Headache and chest congestion. Sxs started yesterday and COVID test negative.).        HPI    Sx onset  yesterday   Difficulty breathing through the nose - nasal congestion   HA +   \" Cold and hot \" not sure if she has a fever   No body aches     Pt reported negative home covid test           Visit Vitals  LMP  (LMP Unknown)   OB Status Postmenopausal   Smoking Status Some Days      No LMP recorded (lmp unknown). Patient is postmenopausal.   Current Outpatient Medications   Medication Instructions    buPROPion XL (WELLBUTRIN XL) 300 mg, oral, Daily    cholecalciferol (Vitamin D3) 10 MCG (400 UNIT) tablet 1 tablet, oral, Daily    ferrous sulfate, 325 mg ferrous sulfate, tablet 1 tablet, oral, Daily    FLAXSEED OIL ORAL Take with water in when needed    folic acid (Folvite) 1 mg tablet 1 tablet, oral, Daily    multivitamin (MULTIPLE VITAMINS ORAL) oral, Daily RT    naloxone (NARCAN) 4 mg, nasal, As needed, May repeat every 2-3 minutes if needed, alternating nostrils, until medical assistance becomes available.    omeprazole (PRILOSEC) 40 mg, oral, Every morning    oxyCODONE (ROXICODONE) 10 mg, oral, Every 6 hours PRN, Take 5 mg (0.5 tablet) for moderate pain and 10 mg (1 tablet) for severe pain    potassium chloride CR 20 mEq ER tablet 40 mEq, oral, Daily    triamterene-hydrochlorothiazid (Maxzide) 75-50 mg tablet 1 tablet, oral, Daily    vitamin A 8,000 Units, oral, Every other day    VITAMIN E ACETATE ORAL 1 tablet, oral, Daily      Social History     Tobacco Use    Smoking status: Some Days     Types: Cigarettes     Passive exposure: Past    Smokeless tobacco: Never   Substance Use Topics    Alcohol use: Yes     Comment: Occasionally        Review of Systems  As noted in HPI     All other systems have been reviewed and are negative for complaint       Physical Exam    Limited physical due to the virtual nature of this visit ( " real time audio- visual )  Constitutional : Alert, in no distress     Pulm : Normal work of breathing   CNS : Moves all extremities symmetrically   Psych:  A , O X 3 normal mood and effect, speaks coherently     Assessment/Plan   Diagnoses and all orders for this visit:  Flu-like symptoms  -     Influenza A, and B PCR; Future  -     Sars-CoV-2 PCR; Future          Flu like sx   Testing as above   Advised decongestants / Tylenol/ motrin as needed in the interim   Pt will come to the office for testing         Conditions addressed and mgmt as noted above.  Pertinent labs, images/ imaging reports , chart review was done .   Age appropriate labs / labs for mgmt of chronic medical conditions ordered, further mgmt pending the results.       This note is intended for the physician writing it, as well as to communicate findings to other healthcare professionals. These notes use medical lexicon that may be misunderstood by non medical persons. Therefore, interpretations of medical notes and terminology should be approached with caution.

## 2024-09-04 NOTE — TELEPHONE ENCOUNTER
Pt called yesterday regarding having team getting her Z-pack as she tried getting her PCP to, but they will not do it. She stated she would like a call back from the doctor if possible. Pt can be reached at [phone]. Message forwarded to team.

## 2024-09-05 ENCOUNTER — TELEPHONE (OUTPATIENT)
Dept: PRIMARY CARE | Facility: CLINIC | Age: 64
End: 2024-09-05
Payer: COMMERCIAL

## 2024-09-05 DIAGNOSIS — U07.1 COVID-19 VIRUS INFECTION: Primary | ICD-10-CM

## 2024-09-05 LAB
FLUAV RNA RESP QL NAA+PROBE: NOT DETECTED
FLUBV RNA RESP QL NAA+PROBE: NOT DETECTED
SARS-COV-2 ORF1AB RESP QL NAA+PROBE: DETECTED

## 2024-09-10 ENCOUNTER — APPOINTMENT (OUTPATIENT)
Dept: OPHTHALMOLOGY | Facility: CLINIC | Age: 64
End: 2024-09-10
Payer: COMMERCIAL

## 2024-09-23 ENCOUNTER — APPOINTMENT (OUTPATIENT)
Dept: OPHTHALMOLOGY | Facility: CLINIC | Age: 64
End: 2024-09-23
Payer: COMMERCIAL

## 2024-09-26 DIAGNOSIS — K44.9 HIATAL HERNIA: Primary | ICD-10-CM

## 2024-09-30 ENCOUNTER — APPOINTMENT (OUTPATIENT)
Dept: HEMATOLOGY/ONCOLOGY | Facility: HOSPITAL | Age: 64
End: 2024-09-30
Payer: COMMERCIAL

## 2024-09-30 ENCOUNTER — TELEPHONE (OUTPATIENT)
Dept: HEMATOLOGY/ONCOLOGY | Facility: HOSPITAL | Age: 64
End: 2024-09-30
Payer: COMMERCIAL

## 2024-09-30 DIAGNOSIS — D57.44 SICKLE CELL DISEASE, TYPE S BETA-PLUS THALASSEMIA (MULTI): Primary | ICD-10-CM

## 2024-09-30 DIAGNOSIS — D57.00 SICKLE CELL DISEASE WITH CRISIS (MULTI): ICD-10-CM

## 2024-09-30 DIAGNOSIS — C34.10 MALIGNANT NEOPLASM OF UPPER LOBE OF LUNG, UNSPECIFIED LATERALITY (MULTI): ICD-10-CM

## 2024-09-30 DIAGNOSIS — G89.4 CHRONIC PAIN SYNDROME: ICD-10-CM

## 2024-09-30 DIAGNOSIS — G43.809 OTHER MIGRAINE WITHOUT STATUS MIGRAINOSUS, NOT INTRACTABLE: Primary | ICD-10-CM

## 2024-09-30 PROCEDURE — 99442 PR PHYS/QHP TELEPHONE EVALUATION 11-20 MIN: CPT | Performed by: PEDIATRICS

## 2024-09-30 RX ORDER — OXYCODONE HYDROCHLORIDE 10 MG/1
10 TABLET ORAL EVERY 6 HOURS PRN
Qty: 60 TABLET | Refills: 0 | Status: SHIPPED | OUTPATIENT
Start: 2024-09-30 | End: 2024-10-02 | Stop reason: SDUPTHER

## 2024-09-30 RX ORDER — BUTALBITAL, ACETAMINOPHEN AND CAFFEINE 300; 40; 50 MG/1; MG/1; MG/1
1 CAPSULE ORAL EVERY 6 HOURS PRN
Qty: 20 CAPSULE | Refills: 0 | Status: SHIPPED | OUTPATIENT
Start: 2024-09-30

## 2024-10-01 ENCOUNTER — TELEPHONE (OUTPATIENT)
Dept: PRIMARY CARE | Facility: CLINIC | Age: 64
End: 2024-10-01
Payer: COMMERCIAL

## 2024-10-01 ENCOUNTER — APPOINTMENT (OUTPATIENT)
Dept: PHYSICAL THERAPY | Facility: CLINIC | Age: 64
End: 2024-10-01
Payer: COMMERCIAL

## 2024-10-01 DIAGNOSIS — L73.2 SUPPURATIVE HIDRADENITIS: ICD-10-CM

## 2024-10-01 NOTE — TELEPHONE ENCOUNTER
----- Message from Bharti Romero sent at 9/30/2024  2:41 PM EDT -----  Regarding: RE: Refill Medications/Derm?  I am still not clear of the reason for her doxy .  Very unusual to have acne at this age, this was the reason she gave last time she needed rx   Please have her see a derm  ----- Message -----  From: Mary Angel  Sent: 9/30/2024   2:03 PM EDT  To: Bharti Romero MD  Subject: Refill Medications/Derm?                         Patient is calling stating that she would like you send in her refills for Doxycyline that her dermatologist originally gave her.  She was getting for 2 a day.  You did refill it August for her.  Her dermatologist did leave the practice and she thinks she should have a new doctor.

## 2024-10-02 ENCOUNTER — TELEPHONE (OUTPATIENT)
Dept: HEMATOLOGY/ONCOLOGY | Facility: HOSPITAL | Age: 64
End: 2024-10-02
Payer: COMMERCIAL

## 2024-10-02 RX ORDER — OXYCODONE HYDROCHLORIDE 10 MG/1
10 TABLET ORAL EVERY 6 HOURS PRN
Qty: 60 TABLET | Refills: 0 | Status: SHIPPED | OUTPATIENT
Start: 2024-10-02 | End: 2024-10-17

## 2024-10-02 NOTE — TELEPHONE ENCOUNTER
VM received from pt who states [facility] informed there is an issue filling prescription of Oxycodone and a Prior Auth was needed. [facility] pharmacy called by this nurse. Per the Pharmacist pt's number of days on script needed to be adjusted from 7-15  due to the quantity of tabs requested for script to be approved.  New script sent by Naida Alva with requested changes.

## 2024-10-08 ENCOUNTER — HOSPITAL ENCOUNTER (OUTPATIENT)
Dept: CARDIOLOGY | Facility: HOSPITAL | Age: 64
Discharge: HOME | End: 2024-10-08
Payer: COMMERCIAL

## 2024-10-08 DIAGNOSIS — D57.1 SICKLE-CELL DISEASE WITHOUT CRISIS (MULTI): ICD-10-CM

## 2024-10-08 DIAGNOSIS — D57.00 SICKLE CELL DISEASE WITH CRISIS (MULTI): ICD-10-CM

## 2024-10-08 LAB
AORTIC VALVE MEAN GRADIENT: 2 MMHG
AORTIC VALVE PEAK VELOCITY: 0.95 M/S
AV PEAK GRADIENT: 3.6 MMHG
AVA (PEAK VEL): 2.53 CM2
AVA (VTI): 2.45 CM2
EJECTION FRACTION APICAL 4 CHAMBER: 53
EJECTION FRACTION: 62 %
LEFT ATRIUM VOLUME AREA LENGTH INDEX BSA: 15.4 ML/M2
LEFT VENTRICLE INTERNAL DIMENSION DIASTOLE: 4.66 CM (ref 3.5–6)
LEFT VENTRICULAR OUTFLOW TRACT DIAMETER: 2.1 CM
MITRAL VALVE E/A RATIO: 0.74
RIGHT VENTRICLE FREE WALL PEAK S': 10 CM/S
RIGHT VENTRICLE PEAK SYSTOLIC PRESSURE: 21.3 MMHG
TRICUSPID ANNULAR PLANE SYSTOLIC EXCURSION: 2.4 CM

## 2024-10-08 PROCEDURE — 93306 TTE W/DOPPLER COMPLETE: CPT | Performed by: INTERNAL MEDICINE

## 2024-10-08 PROCEDURE — 93306 TTE W/DOPPLER COMPLETE: CPT

## 2024-10-14 ENCOUNTER — APPOINTMENT (OUTPATIENT)
Dept: PRIMARY CARE | Facility: CLINIC | Age: 64
End: 2024-10-14
Payer: COMMERCIAL

## 2024-11-01 ASSESSMENT — ENCOUNTER SYMPTOMS
HEMATOLOGIC/LYMPHATIC NEGATIVE: 1
LIGHT-HEADEDNESS: 0
EXTREMITY WEAKNESS: 0
ARTHRALGIAS: 0
CONSTIPATION: 0
NERVOUS/ANXIOUS: 0
VOMITING: 0
SORE THROAT: 0
HEADACHES: 0
DIZZINESS: 0
LEG SWELLING: 0
WOUND: 0
BLOOD IN STOOL: 0
FEVER: 0
DIARRHEA: 0
SHORTNESS OF BREATH: 0
PALPITATIONS: 0
FATIGUE: 1
EYE PROBLEMS: 1
WHEEZING: 0
COUGH: 0
DEPRESSION: 0
BACK PAIN: 0
NAUSEA: 0
CHILLS: 0
ABDOMINAL PAIN: 0
CHEST TIGHTNESS: 0

## 2024-11-05 ENCOUNTER — HOSPITAL ENCOUNTER (OUTPATIENT)
Facility: CLINIC | Age: 64
Setting detail: OUTPATIENT SURGERY
End: 2024-11-05
Attending: OPHTHALMOLOGY | Admitting: OPHTHALMOLOGY
Payer: COMMERCIAL

## 2024-11-06 ENCOUNTER — TELEPHONE (OUTPATIENT)
Dept: HEMATOLOGY/ONCOLOGY | Facility: HOSPITAL | Age: 64
End: 2024-11-06

## 2024-11-06 DIAGNOSIS — D57.00 SICKLE CELL ANEMIA WITH PAIN (MULTI): Primary | ICD-10-CM

## 2024-11-06 DIAGNOSIS — I10 PRIMARY HYPERTENSION: ICD-10-CM

## 2024-11-06 DIAGNOSIS — F33.1 MODERATE EPISODE OF RECURRENT MAJOR DEPRESSIVE DISORDER: ICD-10-CM

## 2024-11-07 RX ORDER — OXYCODONE HYDROCHLORIDE 10 MG/1
10 TABLET ORAL EVERY 6 HOURS PRN
Qty: 60 TABLET | Refills: 0 | Status: SHIPPED | OUTPATIENT
Start: 2024-11-07 | End: 2024-11-14 | Stop reason: SDUPTHER

## 2024-11-08 DIAGNOSIS — Z96.612 STATUS POST SHOULDER REPLACEMENT, LEFT: Primary | ICD-10-CM

## 2024-11-11 ENCOUNTER — APPOINTMENT (OUTPATIENT)
Dept: RADIOLOGY | Facility: CLINIC | Age: 64
End: 2024-11-11
Payer: COMMERCIAL

## 2024-11-11 ENCOUNTER — APPOINTMENT (OUTPATIENT)
Dept: ORTHOPEDIC SURGERY | Facility: CLINIC | Age: 64
End: 2024-11-11
Payer: COMMERCIAL

## 2024-11-11 RX ORDER — BUPROPION HYDROCHLORIDE 300 MG/1
300 TABLET ORAL DAILY
Qty: 90 TABLET | Refills: 3 | Status: SHIPPED | OUTPATIENT
Start: 2024-11-11

## 2024-11-11 RX ORDER — TRIAMTERENE AND HYDROCHLOROTHIAZIDE 75; 50 MG/1; MG/1
1 TABLET ORAL DAILY
Qty: 90 TABLET | Refills: 1 | Status: SHIPPED | OUTPATIENT
Start: 2024-11-11

## 2024-11-13 ENCOUNTER — APPOINTMENT (OUTPATIENT)
Dept: GASTROENTEROLOGY | Facility: HOSPITAL | Age: 64
End: 2024-11-13
Payer: COMMERCIAL

## 2024-11-14 ENCOUNTER — TELEPHONE (OUTPATIENT)
Dept: ADMISSION | Facility: HOSPITAL | Age: 64
End: 2024-11-14
Payer: COMMERCIAL

## 2024-11-14 DIAGNOSIS — D57.00 SICKLE CELL ANEMIA WITH PAIN (MULTI): ICD-10-CM

## 2024-11-14 RX ORDER — OXYCODONE HYDROCHLORIDE 10 MG/1
10 TABLET ORAL EVERY 6 HOURS PRN
Qty: 60 TABLET | Refills: 0 | Status: SHIPPED | OUTPATIENT
Start: 2024-11-14

## 2024-11-18 NOTE — PROGRESS NOTES
Subjective   Patient ID: Sonja Dallas is a 64 y.o. female    Chief Complaint: No chief complaint on file.       Last Surgery: No surgery found  Date of Last Surgery: No surgery found    HPI  Sonja Dallas is a 64 y.o. 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 She reports doing well after surgery. She has no complaints. She's really progressing nicely. She has some occasional pain but nothing significantly. Her right shoulder is doing fairly well.     11/19/24  Sonja Dallas is a 64 y.o. female returning to the clinic for a follow up visit regarding her left shoulder.    She reports that her shoulder has been doing well. She does not think about it much in her day to day life. She has about 80% of a normal left shoulder. She continues to have aches but believes this is related to the weather changing.     Objective   Patient is a well-developed, well-nourished female  in no acute distress.  Breathes with normal chest rises.  Pupils are round and symmetric today.  Awake, alert, and oriented x3.      Examination of the left shoulder today reveals the skin to be intact. There is no sign of any atrophy, lesions, or abrasions. There is no pain to palpation of the bony prominences. Cervical lymphadenopathy examined, and this was negative. Patient had 5 out of 5 wrist flexion, extension, and thumb extension bilaterally. Sensation was intact to light touch to median, ulnar, radial axillary, and musculocutaneous nerves bilaterally. Positive radial pulse bilaterally. Provocative maneuvers on the left side today were negative.  Range of motion of the left shoulder revealed 0-160° of forward elevation, 0-70° of external rotation, and internal rotation was to L-2     Examination of the right shoulder today reveals the skin to be intact. There is no sign of any atrophy, lesions, or abrasions. There is no pain to palpation of the bony prominences.  Cervical lymphadenopathy examined, and this was negative. Patient had 5 out of 5 wrist flexion, extension, and thumb extension, bilaterally. Sensation was intact to light touch to median, ulnar, radial, axillary, and musculocutaneous nerves bilaterally. Positive radial pulse bilaterally. Provocative maneuvers on the right side today were negative.  Range of motion of the right shoulder revealed 0-170° of forward elevation, 0-60° of external rotation, and internal rotation up to T-12.    Imaging:  X-rays of the LEFT shoulder taken today personally ordered and interpreted by me show no sign of any loosening. Cement inset intact.     Assessment/Plan   No diagnosis found.  Patient s/p left anatomic total shoulder replacement    I suspect that her subscapularis has not fully healed. She has 80% of a normal shoulder today. She is doing really well overall. She will continue activities as tolerated and may do whatever she wants. We will see her back in 5 years for her 10 year check-in.     No orders of the defined types were placed in this encounter.      Follow up in 2029    Scribe Attestation  By signing my name below, I, Grace Garcia   attest that this documentation has been prepared under the direction and in the presence of Ryan Santiago MD.

## 2024-11-19 ENCOUNTER — HOSPITAL ENCOUNTER (OUTPATIENT)
Dept: RADIOLOGY | Facility: HOSPITAL | Age: 64
Discharge: HOME | End: 2024-11-19
Payer: COMMERCIAL

## 2024-11-19 ENCOUNTER — APPOINTMENT (OUTPATIENT)
Dept: ORTHOPEDIC SURGERY | Facility: HOSPITAL | Age: 64
End: 2024-11-19
Payer: COMMERCIAL

## 2024-11-19 DIAGNOSIS — M25.512 LEFT SHOULDER PAIN, UNSPECIFIED CHRONICITY: Primary | ICD-10-CM

## 2024-11-19 DIAGNOSIS — M25.512 LEFT SHOULDER PAIN, UNSPECIFIED CHRONICITY: ICD-10-CM

## 2024-11-19 PROCEDURE — 99213 OFFICE O/P EST LOW 20 MIN: CPT | Performed by: ORTHOPAEDIC SURGERY

## 2024-11-19 PROCEDURE — 73030 X-RAY EXAM OF SHOULDER: CPT | Mod: LEFT SIDE

## 2024-11-19 PROCEDURE — 73030 X-RAY EXAM OF SHOULDER: CPT | Mod: LT

## 2024-11-25 ENCOUNTER — LAB (OUTPATIENT)
Dept: LAB | Facility: LAB | Age: 64
End: 2024-11-25
Payer: COMMERCIAL

## 2024-11-25 ENCOUNTER — APPOINTMENT (OUTPATIENT)
Dept: PRIMARY CARE | Facility: CLINIC | Age: 64
End: 2024-11-25
Payer: COMMERCIAL

## 2024-11-25 VITALS
DIASTOLIC BLOOD PRESSURE: 78 MMHG | WEIGHT: 149.8 LBS | BODY MASS INDEX: 25.57 KG/M2 | SYSTOLIC BLOOD PRESSURE: 129 MMHG | HEART RATE: 94 BPM | OXYGEN SATURATION: 99 % | HEIGHT: 64 IN

## 2024-11-25 DIAGNOSIS — D57.40: ICD-10-CM

## 2024-11-25 DIAGNOSIS — R79.89 ABNORMAL TSH: ICD-10-CM

## 2024-11-25 DIAGNOSIS — Z00.00 ROUTINE GENERAL MEDICAL EXAMINATION AT A HEALTH CARE FACILITY: ICD-10-CM

## 2024-11-25 DIAGNOSIS — F33.1 MODERATE EPISODE OF RECURRENT MAJOR DEPRESSIVE DISORDER: ICD-10-CM

## 2024-11-25 DIAGNOSIS — Z85.118 H/O MALIGNANT NEOPLASM OF LUNG: ICD-10-CM

## 2024-11-25 DIAGNOSIS — Z12.31 VISIT FOR SCREENING MAMMOGRAM: ICD-10-CM

## 2024-11-25 DIAGNOSIS — I10 PRIMARY HYPERTENSION: ICD-10-CM

## 2024-11-25 DIAGNOSIS — Z23 NEED FOR INFLUENZA VACCINATION: ICD-10-CM

## 2024-11-25 DIAGNOSIS — Z00.00 ROUTINE GENERAL MEDICAL EXAMINATION AT A HEALTH CARE FACILITY: Primary | ICD-10-CM

## 2024-11-25 LAB
CHOLEST SERPL-MCNC: 169 MG/DL (ref 0–199)
CHOLESTEROL/HDL RATIO: 3.8
HDLC SERPL-MCNC: 44.4 MG/DL
LDLC SERPL CALC-MCNC: 91 MG/DL
NON HDL CHOLESTEROL: 125 MG/DL (ref 0–149)
T4 FREE SERPL-MCNC: 1.02 NG/DL (ref 0.61–1.12)
TRIGL SERPL-MCNC: 168 MG/DL (ref 0–149)
TSH SERPL-ACNC: 0.24 MIU/L (ref 0.44–3.98)
VLDL: 34 MG/DL (ref 0–40)

## 2024-11-25 PROCEDURE — 4004F PT TOBACCO SCREEN RCVD TLK: CPT | Performed by: STUDENT IN AN ORGANIZED HEALTH CARE EDUCATION/TRAINING PROGRAM

## 2024-11-25 PROCEDURE — 84443 ASSAY THYROID STIM HORMONE: CPT

## 2024-11-25 PROCEDURE — 99396 PREV VISIT EST AGE 40-64: CPT | Performed by: STUDENT IN AN ORGANIZED HEALTH CARE EDUCATION/TRAINING PROGRAM

## 2024-11-25 PROCEDURE — 3074F SYST BP LT 130 MM HG: CPT | Performed by: STUDENT IN AN ORGANIZED HEALTH CARE EDUCATION/TRAINING PROGRAM

## 2024-11-25 PROCEDURE — 84439 ASSAY OF FREE THYROXINE: CPT

## 2024-11-25 PROCEDURE — 99215 OFFICE O/P EST HI 40 MIN: CPT | Performed by: STUDENT IN AN ORGANIZED HEALTH CARE EDUCATION/TRAINING PROGRAM

## 2024-11-25 PROCEDURE — 3078F DIAST BP <80 MM HG: CPT | Performed by: STUDENT IN AN ORGANIZED HEALTH CARE EDUCATION/TRAINING PROGRAM

## 2024-11-25 PROCEDURE — 3008F BODY MASS INDEX DOCD: CPT | Performed by: STUDENT IN AN ORGANIZED HEALTH CARE EDUCATION/TRAINING PROGRAM

## 2024-11-25 PROCEDURE — 90656 IIV3 VACC NO PRSV 0.5 ML IM: CPT | Performed by: STUDENT IN AN ORGANIZED HEALTH CARE EDUCATION/TRAINING PROGRAM

## 2024-11-25 PROCEDURE — 36415 COLL VENOUS BLD VENIPUNCTURE: CPT

## 2024-11-25 PROCEDURE — 84480 ASSAY TRIIODOTHYRONINE (T3): CPT

## 2024-11-25 PROCEDURE — 83036 HEMOGLOBIN GLYCOSYLATED A1C: CPT

## 2024-11-25 PROCEDURE — 80061 LIPID PANEL: CPT

## 2024-11-25 PROCEDURE — 90471 IMMUNIZATION ADMIN: CPT | Performed by: STUDENT IN AN ORGANIZED HEALTH CARE EDUCATION/TRAINING PROGRAM

## 2024-11-25 NOTE — PROGRESS NOTES
"  Subjective     Patient ID: Sonja Dallas is a 64 y.o. female who presents for Annual Exam (APE. ).      Last Physical : ___Years ago     Pt's PMH, PSH, SH, FH , meds and allergies was obtained / reviewed and updated .     Dental visits : Y  Vision issues ::   Hearing issues : N    Immunizations : Y    Diet :     Exercise:  Weight concerns :     Alcohol: as noted in   Tobacco: as noted in   Recreational drug use : None/ as noted in     ======================================================    Visit Vitals  /78   Pulse 94   Ht 1.626 m (5' 4\")   Wt 67.9 kg (149 lb 12.8 oz)   LMP  (LMP Unknown)   SpO2 99%   BMI 25.71 kg/m²   OB Status Postmenopausal   Smoking Status Some Days   BSA 1.75 m²      No LMP recorded (lmp unknown). Patient is postmenopausal.   Current Outpatient Medications   Medication Instructions    buPROPion XL (WELLBUTRIN XL) 300 mg, oral, Daily    butalbital-acetaminophen-caff (Fioricet) -40 mg capsule 1 capsule, oral, Every 6 hours PRN    cholecalciferol (Vitamin D3) 10 MCG (400 UNIT) tablet 1 tablet, Daily    ferrous sulfate, 325 mg ferrous sulfate, tablet 1 tablet, Daily    FLAXSEED OIL ORAL Take with water in when needed    folic acid (Folvite) 1 mg tablet 1 tablet, Daily    multivitamin (MULTIPLE VITAMINS ORAL) Daily RT    mv,trey,iron,mn/folic acid/chol (HAIR-SKIN-NAILS, PABA, ORAL) Take by mouth.    naloxone (NARCAN) 4 mg, nasal, As needed, May repeat every 2-3 minutes if needed, alternating nostrils, until medical assistance becomes available.    omeprazole (PRILOSEC) 40 mg, oral, Every morning    oxyCODONE (ROXICODONE) 10 mg, oral, Every 6 hours PRN    potassium chloride CR 20 mEq ER tablet 40 mEq, oral, Daily    triamterene-hydrochlorothiazid (Maxzide) 75-50 mg tablet 1 tablet, oral, Daily    VITAMIN E ACETATE ORAL 1 tablet, Daily      Social History     Tobacco Use    Smoking status: Some Days     Types: Cigarettes     Passive exposure: Past    Smokeless tobacco: Never "   Substance Use Topics    Alcohol use: Yes     Comment: Occasionally        =====================================    Review of systems:  Constitutional: no chills, no fever and no night sweats.     Eyes: no blurred vision and no eyesight problems.     ENT: no hearing loss, no nasal congestion, no nasal discharge, no hoarseness and no sore throat.     Cardiovascular: no chest pain, no intermittent leg claudication, no lower extremity edema, no palpitations and no syncope.     Respiratory: no cough, no shortness of breath during exertion, no shortness of breath at rest and no wheezing.     Gastrointestinal: no abdominal pain, no blood in stools, no constipation, no diarrhea, no melena, no nausea, no rectal pain and no vomiting.     Genitourinary: no dysuria, no change in urinary frequency, no urinary hesitancy, no feelings of urinary urgency and no vaginal discharge.     Musculoskeletal: no arthralgias, no back pain and no myalgias.     Integumentary: no new skin lesions and no rashes.     Neurological: no difficulty walking, no headache, no limb weakness, no numbness and no tingling.     Psychiatric: no anxiety, no depression, no anhedonia and no substance use disorders.   ============================================================    Physical exam :    Constitutional: Alert and in no acute distress. Well developed, well nourished.     Eyes: Normal external exam. Pupils were equal in size, round, reactive to light (PERRL) with normal accommodation and extraocular movements intact (EOMI).     Ears, Nose, Mouth, and Throat: External inspection of ears and nose: Normal.  Otoscopic examination: Normal.      Neck: No neck mass was observed. Supple.     Cardiovascular: Heart rate and rhythm were normal, normal S1 and S2, no gallops, no murmurs and no pericardial rub    Pulmonary: No respiratory distress. Clear bilateral breath sounds.     Abdomen: Soft nontender; no abdominal mass palpated. No organomegaly.      Musculoskeletal: No joint swelling seen, normal movements of all extremities. Range of motion: Normal.  Muscle strength/tone: Normal.      Neurologic: Deep tendon reflexes were 2+ and symmetric. Sensation: Normal.     Psychiatric: Judgment and insight: Intact. Mood and affect: Normal.        Assessment/Plan    Diagnoses and all orders for this visit:  Routine general medical examination at a health care facility  -     Hemoglobin A1C; Future  -     Lipid Panel; Future  -     TSH with reflex to Free T4 if abnormal; Future  Need for influenza vaccination  -     Flu vaccine, trivalent, preservative free, age 6 months and greater (Fluraix/Fluzone/Flulaval)  Visit for screening mammogram  -     BI mammo bilateral screening tomosynthesis; Future  H/O malignant neoplasm of lung  -     CT chest wo IV contrast; Future  Primary hypertension  Sickle cell-thalassemia disease (Multi)  Moderate episode of recurrent major depressive disorder      63 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   - Htn  - Depression   - Sickle cell + beta thal :est with hematology   - Experiences acne ? Reactive acne sine the use mask with Covid - take doxy prn , has an appt with derm   - H/o lung cancer [id number] : surveillance imaging ordered   Last done in 2017     HM :   Vaccines:  -Tdap : due   -Flu :  given today  -Shingles :  current     Cancer screenings:  -Mammogram : ordered  -Colonoscopy:  current, 09/2021  -PAP :  to fu with gyn  03/2022 neg/ neg     General preventive care discussed which includes regular exercise, healthy eating habits, to include more of plant based diet, to include foods of  all colors  , limiting excessive red meat intake , staying active to maintain a weight that is appropriate for his/ her height / making efforts to reach an ideal weight for height,  avoidance of smoking, excess alcohol consumption, avoidance of recreational drugs, safe and responsible sexual relationships and practices.     It is recommended to get 150 mins of  moderate intensity  ( you should be able to talk and not sing ) exercise in a week .     Calcium + Vit D supplements recommended   Regular exercise recommended   Healthy eating choices discussed and recommended       Conditions addressed and mgmt as noted above.  Pertinent labs, images/ imaging reports , chart review was done .   Age appropriate labs / labs for mgmt of chronic medical conditions ordered, further mgmt pending the results.       This note is intended for the physician writing it, as well as to communicate findings to other healthcare professionals. These notes use medical lexicon that may be misunderstood by non medical persons. Therefore, interpretations of medical notes and terminology should be approached with caution.

## 2024-11-27 DIAGNOSIS — R79.89 ABNORMAL TSH: Primary | ICD-10-CM

## 2024-11-27 LAB — T3 SERPL-MCNC: 89 NG/DL (ref 60–200)

## 2024-11-28 LAB — HBA1C MFR BLD: NORMAL %

## 2024-12-17 ENCOUNTER — OFFICE VISIT (OUTPATIENT)
Dept: NEUROLOGY | Facility: CLINIC | Age: 64
End: 2024-12-17
Payer: COMMERCIAL

## 2024-12-17 VITALS
SYSTOLIC BLOOD PRESSURE: 126 MMHG | HEART RATE: 95 BPM | RESPIRATION RATE: 18 BRPM | TEMPERATURE: 97.5 F | BODY MASS INDEX: 25.44 KG/M2 | HEIGHT: 64 IN | DIASTOLIC BLOOD PRESSURE: 70 MMHG | WEIGHT: 149 LBS

## 2024-12-17 DIAGNOSIS — G43.809 OTHER MIGRAINE WITHOUT STATUS MIGRAINOSUS, NOT INTRACTABLE: ICD-10-CM

## 2024-12-17 PROCEDURE — 99214 OFFICE O/P EST MOD 30 MIN: CPT | Performed by: STUDENT IN AN ORGANIZED HEALTH CARE EDUCATION/TRAINING PROGRAM

## 2024-12-17 PROCEDURE — 3078F DIAST BP <80 MM HG: CPT | Performed by: STUDENT IN AN ORGANIZED HEALTH CARE EDUCATION/TRAINING PROGRAM

## 2024-12-17 PROCEDURE — 3008F BODY MASS INDEX DOCD: CPT | Performed by: STUDENT IN AN ORGANIZED HEALTH CARE EDUCATION/TRAINING PROGRAM

## 2024-12-17 PROCEDURE — 99204 OFFICE O/P NEW MOD 45 MIN: CPT | Performed by: STUDENT IN AN ORGANIZED HEALTH CARE EDUCATION/TRAINING PROGRAM

## 2024-12-17 PROCEDURE — 3074F SYST BP LT 130 MM HG: CPT | Performed by: STUDENT IN AN ORGANIZED HEALTH CARE EDUCATION/TRAINING PROGRAM

## 2024-12-17 RX ORDER — METHYLPREDNISOLONE 4 MG/1
TABLET ORAL
Qty: 21 TABLET | Refills: 0 | Status: SHIPPED | OUTPATIENT
Start: 2024-12-17

## 2024-12-17 RX ORDER — SUMATRIPTAN SUCCINATE 50 MG/1
50 TABLET ORAL ONCE AS NEEDED
Qty: 9 TABLET | Refills: 0 | Status: SHIPPED | OUTPATIENT
Start: 2024-12-17 | End: 2025-01-16

## 2024-12-17 ASSESSMENT — PATIENT HEALTH QUESTIONNAIRE - PHQ9
SUM OF ALL RESPONSES TO PHQ9 QUESTIONS 1 AND 2: 0
2. FEELING DOWN, DEPRESSED OR HOPELESS: NOT AT ALL
1. LITTLE INTEREST OR PLEASURE IN DOING THINGS: NOT AT ALL

## 2024-12-17 ASSESSMENT — ENCOUNTER SYMPTOMS
LOSS OF SENSATION IN FEET: 0
OCCASIONAL FEELINGS OF UNSTEADINESS: 1
DEPRESSION: 0

## 2024-12-17 ASSESSMENT — PAIN SCALES - GENERAL: PAINLEVEL_OUTOF10: 0-NO PAIN

## 2024-12-17 NOTE — PROGRESS NOTES
"   Neurological [facility]   Sonja Dallas is a 64 y.o. year old female presenting for neurologic evaluation.   Referred by: Oleg Pascal MD  PCP: Bharti Romero MD    12/17/2024: referral for migraine     Pt reports she was in her 40s when this headache first started. At that time, it spontaneously went away.     Then it has started again, she has been having daily morning headaches for the past 6 months. This is described as sharp pain behind the R eye inside the head. Associated with photophobia and nausea, seems to last all day. She is taking daily tylenol and oxycontin for her myalgias / arthritic pains. She has been started on Fioricet which pt reports helps with the headache but HA recurs when medication wears off.     Pt reports she has tried amitriptyline prior for different indication but had to be stopped due to side effects of drowsiness. She also was on verapamil prior which was stopped due to low blood pressure. Not on any preventative medication currently. Not on any other abortives other than Fioricet currently.       Current Acute Headache Treatment None   Current Preventative Headache Treatment None   Previous Acute Headache Treatment  None   Previous Preventative Headache Treatment Amitriptyline , verapamil (low blood pressure),              Review of imaging, echocardiogram, labs and other data:   MRI:  Vessel imaging:  Echo:            Ejection Fraction:          LA size:          Bubble study:  LDL: No data recorded  A1c:     Relevant ROS, Problem list, Past Medical/ Surgical/ Family/ Social history- reviewed and pertinent details noted in history.     Objective     Visit Vitals  /70 (BP Location: Left arm, Patient Position: Sitting, BP Cuff Size: Adult)   Pulse 95   Temp 36.4 °C (97.5 °F) (Temporal)   Resp 18   Ht 1.626 m (5' 4\")   Wt 67.6 kg (149 lb)   LMP  (LMP Unknown)   BMI 25.58 kg/m²   OB Status Postmenopausal   Smoking Status Former   BSA 1.75 m²    "    MENTAL STATUS:  General appearance: in NAD  Orientation: [place] to self, time, place and condition   Language: Expression, repetition, naming, comprehension intact.   Concentration: Intact  Fund of knowledge: Appropriate    CRANIAL NERVES:  - Fundoscopic exam: Deferred   - II/III: PERRL  - II:  Visual fields intact to confrontation bilaterally   - III, IV, VI: EOMI to pursuit without nystagmus  - V: V1-V3 sensation intact bilaterally  - VII: Face muscles symmetric with smile and eye closure  - VIII: Intact to finger rub  - IX, X: Palate elevated symmetrically bilaterally, no hoarseness  - XI: 5/5 strength on shoulder shrugging bilaterally  - XII: Tongue midline without atrophy or fasciculation    MOTOR: Tone and bulk normal in all extremities  STRENGTH: 5/5 strength tested proximally and distally in BUE and BLE     REFLEXES: R L  Biceps   +2 +2  Brachioradialis +2 +2  Patellar   +2 +2  No clonus  COORDINATION: Intact on finger to nose bl, intact on heel to shin bl, MIN intact bl  SENSORY: Intact to light touch in BUE and BLE  GAIT: normal gait       No CT head results found for the past 12 months  No MRI head results found for the past 12 months  No results found for this or any previous visit (from the past 4464 hours).  No echocardiogram results found for the past 12 months     Lab Results   Component Value Date    CHOL 169 11/25/2024    TRIG 168 (H) 11/25/2024    HDL 44.4 11/25/2024    CHHDL 3.8 11/25/2024    LDLF 97 07/13/2023    VLDL 34 11/25/2024    NHDL 125 11/25/2024     Lab Results   Component Value Date    BNP 17 04/06/2023    HGBA1C  11/25/2024      Comment:      Homozygous or compound heterozygous hemoglobin variants detected (such as Hemoglobin SS, SC, etc). Hemoglobin A1c cannot be accurately determined by any method. Recommend fructosamine or glycated albumin measurement for monitoring glycemic control. Hemoglobin identification can be ordered to characterize the variant if clinically indicated.      Lab Results   Component Value Date    GLUCOSE 88 07/07/2024     07/07/2024    K 4.3 07/07/2024    CL 99 07/07/2024    CO2 28 07/07/2024    ANIONGAP 13 07/07/2024    BUN 13 07/07/2024    CREATININE 1.00 07/07/2024    CALCIUM 9.5 07/07/2024    PHOS 3.8 02/10/2020    ALBUMIN 3.7 07/07/2024     Lab Results   Component Value Date    CALCIUM 9.5 07/07/2024    PHOS 3.8 02/10/2020    PROT 6.3 (L) 07/07/2024    ALBUMIN 3.7 07/07/2024    AST 23 07/07/2024    ALT 12 07/07/2024    ALKPHOS 65 07/07/2024    BILITOT 1.0 07/07/2024     Lab Results   Component Value Date    WBC 4.3 (L) 07/07/2024    HGB 8.1 (L) 07/07/2024    HCT 24.6 (L) 07/07/2024    MCV 83 07/07/2024     07/07/2024     INR   Date Value Ref Range Status   02/28/2019 1.1 0.9 - 1.1 Final     Lab Results   Component Value Date    TSH 0.24 (L) 11/25/2024       Assessment/Plan   1. Other migraine without status migrainosus, not intractable        PLAN   Daily headaches, does fit criteria of migraines, but there is likely component of medication overuse headache. Discussed trial of medrol taper to see if it would help with breaking the daily headache cycle. As an alternative abortive measures sumatriptan 50mg is recommended as well. Discussed medication overuse headache and need to limit meds if able. Daily preventative medication is also likely needed -- discussed pt may need to try one more preventative medication prior to trial of CGRP inhibitors. Botox injections can also be an option but may take couple months to fully see the effect. Pt would consider these options.         CODING and DOCUMENTATION DETERMINATION  For the Evaluation and Management of this patient, the level of Medical Decision Making for this visit was determined based on the following:    The level of COMPLEXITY AND NUMBER OF PROBLEMS ADDRESSED was:  MODERATE: one chronic illnesses with exacerbation, progression or side effect of Rx.    The AMOUNT/COMPLEXITY OF DATA TO REVIEW  (reviewed, ordered or call for) was:  MODERATE (need one of the three): my independent interpretation a test performed by another provider.    The level of RISK OF COMPLICATIONS was:  MODERATE: Prescription drug management.    Thus, the level of medical decision making (based on the lower of the two highest elements) was determined to be [MODERATE]. Therefore the appropriate E/M code for this encounter is [11386/50541].

## 2024-12-17 NOTE — PATIENT INSTRUCTIONS
It was a pleasure meeting you. You were evaluated for migraines today.     Because you have already tried amitriptyline and verapamil, the last couple options for daily medications include depakote or topamax.  The newer generation medications are called CGRP inhibitors -- Aimovig, Emgality, Nurtec, Qulipta, etc. These would need insurance approvals. Botox injection is an option as well.    For now, we'll try medrol dose pack to see if it would help current daily headache go away.   We will also try sumatriptan to see if it would work to break the headache.     Thank you for choosing the [facility] for your healthcare.    It was a pleasure to see you in clinic today and be able to participate in your care. I hope your questions were fully answered but if there are questions or concerns in the future, please feel to call [phone] or leave [employer] messages. Please allow 24-28 hours to return your call. Please be ware of stroke warning symptoms and call 911 with any of acute stroke symptoms.          Keep a calendar of your migraines.    Wear hair loose.      Exercise regularly, get plenty of fresh air and regular, sleep.    Preventative medication should be taken every day regardless of whether or not, you have a headache.  It is to prevent headaches.    Medication to treat the actual headache should be taken as soon as you realize you have a headache.    Medications such as Maxalt, Imitrex, Relpax, Zomig, can be taken only twice a day and they're only nine pills per month.  If you have frequent headaches may need to save these for the worst headaches.    Do not take pain medication every day.  Your body can become addicted to this medication and may make the headaches worse.  If you are having headaches every day, we need to increase the preventative medication, call me if this is becoming a problem.    Avoid foods that can cause migraines.  These include: Red wine, MSG,  nitrates (hot dogs, or lunch meats), caffeine, chocolate, cheese.    Monosodium Glutamate (MSG)  MSG also known as Monosodium glutamate is a common food additive. MSG is used to make food taste better, and can be found in processed products, fast food, and canned soups. MSG can cause headaches when consumed in large amounts.    Fast Foods that Contain MSG  Ch[employer] Chicken [place]   [employer] Fried Chicken's Extra Crispy Chicken Breast    Chips  (Will be listed on ingredient list Monosodium Glutamate)  Doritos  Pringles  Potato Chips    Meats  Hot dogs    Lunch Meats  Beef jerky    Sausages  Smoked meats    Pepperoni  Meat snack sticks    Grove  Patrasmi    Hamburgers  Cold cuts    Salami  Fried Chicken      Chicken Nuggets  Burgers    Sauces  Ketchup    Mayonnaise  Barbecue sauce   Salad dressing  Soy sauce    Mustard    Miscellaneous  Bodybuilding protein powder  Instant Ramen  Spices    Items that cause headaches without MSG  Alcohol (red wine, beer, whiskey, Scotch, and champagne )  Cheese (Aged Cheeses: including Parmesan, Swiss, Brie, Cheddar)

## 2024-12-23 ENCOUNTER — TELEPHONE (OUTPATIENT)
Dept: ADMISSION | Facility: HOSPITAL | Age: 64
End: 2024-12-23
Payer: COMMERCIAL

## 2024-12-23 DIAGNOSIS — D57.00 SICKLE CELL ANEMIA WITH PAIN (MULTI): ICD-10-CM

## 2024-12-23 RX ORDER — NALOXONE HYDROCHLORIDE 4 MG/.1ML
1 SPRAY NASAL AS NEEDED
Qty: 2 EACH | Refills: 0 | Status: SHIPPED | OUTPATIENT
Start: 2024-12-23

## 2024-12-23 RX ORDER — OXYCODONE HYDROCHLORIDE 10 MG/1
10 TABLET ORAL EVERY 6 HOURS PRN
Qty: 60 TABLET | Refills: 0 | Status: SHIPPED | OUTPATIENT
Start: 2024-12-23

## 2024-12-23 NOTE — TELEPHONE ENCOUNTER
Pt requesting refill   Oxycodone 10mg. 1 tablet every 6 hrs prn  Pharmacy: [facility] [address] [facility]   Last FUV 9/30, next FUV 12/30

## 2024-12-30 ENCOUNTER — APPOINTMENT (OUTPATIENT)
Dept: GASTROENTEROLOGY | Facility: HOSPITAL | Age: 64
End: 2024-12-30
Payer: COMMERCIAL

## 2024-12-30 ENCOUNTER — TELEPHONE (OUTPATIENT)
Dept: HEMATOLOGY/ONCOLOGY | Facility: HOSPITAL | Age: 64
End: 2024-12-30

## 2024-12-30 ENCOUNTER — TELEMEDICINE (OUTPATIENT)
Dept: HEMATOLOGY/ONCOLOGY | Facility: HOSPITAL | Age: 64
End: 2024-12-30
Payer: COMMERCIAL

## 2024-12-30 ENCOUNTER — TELEPHONE (OUTPATIENT)
Dept: ADMISSION | Facility: HOSPITAL | Age: 64
End: 2024-12-30
Payer: COMMERCIAL

## 2024-12-30 DIAGNOSIS — D57.00 SICKLE CELL DISEASE WITH CRISIS (MULTI): ICD-10-CM

## 2024-12-30 DIAGNOSIS — G89.4 CHRONIC PAIN SYNDROME: ICD-10-CM

## 2024-12-30 DIAGNOSIS — D57.44 SICKLE CELL DISEASE, TYPE S BETA-PLUS THALASSEMIA (MULTI): ICD-10-CM

## 2024-12-30 DIAGNOSIS — J22 ACUTE RESPIRATORY INFECTION: Primary | ICD-10-CM

## 2024-12-30 PROCEDURE — 99442 PR PHYS/QHP TELEPHONE EVALUATION 11-20 MIN: CPT | Performed by: PEDIATRICS

## 2024-12-30 RX ORDER — AZITHROMYCIN 250 MG/1
250 TABLET, FILM COATED ORAL DAILY
Qty: 6 TABLET | Refills: 0 | Status: SHIPPED | OUTPATIENT
Start: 2024-12-30 | End: 2025-01-04

## 2024-12-30 RX ORDER — NALOXONE HYDROCHLORIDE 4 MG/.1ML
4 SPRAY NASAL AS NEEDED
Qty: 2 EACH | Refills: 0 | COMMUNITY
Start: 2024-12-30 | End: 2024-12-30

## 2024-12-30 NOTE — TELEPHONE ENCOUNTER
Pt would like her appointment with Dr. Pascal changed to virtual today at 11:00 due to possibly coming down with something. I secured chat Dr. Pascal and Heather and messaged team.

## 2024-12-30 NOTE — TELEPHONE ENCOUNTER
Received Secure chat back from Heather Monreal on team that Dr. Pascal prefers in person but if she is getting sick a virtual appointment is ok to do today. Sonja said she wants the virtual. I changed in the system. Nothing further needed.

## 2024-12-31 ASSESSMENT — ENCOUNTER SYMPTOMS
DIZZINESS: 0
NERVOUS/ANXIOUS: 0
BLOOD IN STOOL: 0
EXTREMITY WEAKNESS: 0
COUGH: 0
WOUND: 0
DIARRHEA: 0
CHEST TIGHTNESS: 0
ARTHRALGIAS: 0
FEVER: 0
HEMATOLOGIC/LYMPHATIC NEGATIVE: 1
LEG SWELLING: 0
NAUSEA: 0
BACK PAIN: 0
HEADACHES: 0
CHILLS: 0
DEPRESSION: 0
SHORTNESS OF BREATH: 0
FATIGUE: 1
EYE PROBLEMS: 1
ABDOMINAL PAIN: 0
PALPITATIONS: 0
VOMITING: 0
LIGHT-HEADEDNESS: 0
SORE THROAT: 0
CONSTIPATION: 0
WHEEZING: 0

## 2024-12-31 NOTE — PROGRESS NOTES
SICKLE CELL OUTPATIENT NOTE (Telephone visit)  Patient ID: Sonja Dallas   Visit Type: Follow up visit     ASSESSMENT AND PLAN  Sonja Dallas is 64 y.o. female with     History of Hb S beta plus thalassemia  Chronic pain, secondary to bilateral AVN status post bilateral hip and right shoulder replacement  Respiratory tract infection, productive for sputum, and at high risk of progression to acute chest syndrome  MDD, well controlled     PLAN   - No changes in her current pain regimen and she will continue to receive   Oral tylenol and or ibuprofen as needed for mild to moderate pain   Oral oxycodone 5 mg every 4-6 hours prn for moderate, severe and breakthrough pain  Flexeril as needed as muscle relaxant   Non pharmacologic methods of pain control   Reviewed [facility]  Naloxone readily available at all times for opioid overdose   - We will start empiric antibiotics with Z-Fred for respiratory tract infection  - Wellbutrin  mg daily and continue to follow up with her therapist    - Daily folic acid   - Next office visit will be in person in 4 weeks time     Chief Complaint: Follow up for HB S beta + thal SCD     Interval History: Sonja Dallas is present for a phone visit today.  Unfortunately she could not make it to this encounter, because she is coming down with a common cold and has a headache.  She has a cough which is productive of sputum, but has been afebrile she is very congested and is worried about respiratory tract infection getting worse.  Chronic pain is otherwise well-controlled.  Migraine headaches are still present and recently followed up with her neurologist. She denies chest pain or chest tightness, recent hospitalization or ED visits.   She denies worsening or changes in her vision and has planned cataract surgery coming up.  Her mood and affect have been normal and at her baseline    Review of System:   Review of Systems   Constitutional:  Positive for fatigue. Negative for chills and  fever.   HENT:   Negative for nosebleeds and sore throat.    Eyes:  Positive for eye problems.   Respiratory:  Negative for chest tightness, cough, shortness of breath and wheezing.    Cardiovascular:  Negative for chest pain, leg swelling and palpitations.   Gastrointestinal:  Negative for abdominal pain, blood in stool, constipation, diarrhea, nausea and vomiting.   Genitourinary: Negative.     Musculoskeletal:  Negative for arthralgias and back pain.   Skin:  Negative for rash and wound.   Neurological:  Negative for dizziness, extremity weakness, headaches and light-headedness.   Hematological: Negative.    Psychiatric/Behavioral:  Negative for depression. The patient is not nervous/anxious.       Allergies:   Allergies   Allergen Reactions    Gabapentin Other     Abdominal pain    Gloves, Latex With Aloe Vera Swelling     Latex Gloves MISC    Latex Swelling     gloves    Morphine Unknown     itching    Naproxen Unknown       Current Medications:   Medication Documentation Review Audit       Reviewed by Oleg Pascal MD (Physician) on 12/31/24 at 1353      Medication Order Taking? Sig Documenting Provider Last Dose Status   azithromycin (Zithromax) 250 mg tablet [id number]  Take 1 tablet (250 mg) by mouth once daily for 5 days. Take 2 tabs (500 mg) by mouth today, then take 1 tab daily for 4 days. Oleg Pascal MD  Active   buPROPion XL (Wellbutrin XL) 300 mg 24 hr tablet [id number] Yes Take 1 tablet (300 mg) by mouth once daily. Bharti Romero MD  Active   butalbital-acetaminophen-caff (Fioricet) -40 mg capsule [id number] Yes Take 1 capsule by mouth every 6 hours if needed for headaches (migraine headaches). Oleg Pascal MD  Active   cholecalciferol (Vitamin D3) 10 MCG (400 UNIT) tablet [id number] Yes Take 1 tablet (10 mcg) by mouth once daily. Historical Provider, MD  Active   ferrous sulfate, 325 mg ferrous sulfate, tablet [id number] Yes Take 1 tablet (325 mg) by mouth once daily. Historical  Provider, MD  Active   FLAXSEED OIL ORAL [id number]  Take with water in when needed   Patient not taking: Reported on 2024    Historical Provider, MD  Active   folic acid (Folvite) 1 mg tablet [id number] Yes Take 1 tablet (1 mg) by mouth once daily. Historical Provider, MD  Active   methylPREDNISolone (Medrol Dospak) 4 mg tablets [id number] Yes Follow schedule on package instructions Holley Auguste MD  Active   multivitamin (MULTIPLE VITAMINS ORAL) [id number] Yes Take by mouth once daily. Historical Provider, MD  Active   mv,trey,iron,mn/folic acid/chol (HAIR-SKIN-NAILS, PABA, ORAL) [id number] Yes Take by mouth. Historical Provider, MD  Active   naloxone (Narcan) 4 mg/0.1 mL nasal spray [id number] Yes Administer 1 spray (4 mg) into affected nostril(s) if needed for opioid reversal. May repeat every 2-3 minutes if needed, alternating nostrils, until medical assistance becomes available. Oleg Pascal MD   24   naloxone (Narcan) 4 mg/0.1 mL nasal spray [id number] Yes Administer 1 spray (4 mg) into affected nostril(s) if needed for opioid reversal. May repeat every 2-3 minutes if needed, alternating nostrils, until medical assistance becomes available. Oleg Pascal MD  Active   naloxone (Narcan) 4 mg/0.1 mL nasal spray [id number] Yes Administer 1 spray (4 mg) into affected nostril(s) if needed for opioid reversal. May repeat every 2-3 minutes if needed, alternating nostrils, until medical assistance becomes available. Oleg Pascal MD   24 2359   omeprazole (PriLOSEC) 40 mg DR capsule [id number] Yes Take 1 capsule (40 mg) by mouth once daily in the morning. Bharti Romero MD  Active   oxyCODONE (Roxicodone) 10 mg immediate release tablet [id number] Yes Take 1 tablet (10 mg) by mouth every 6 hours if needed for severe pain (7 - 10). Oleg Pascal MD  Active   potassium chloride CR 20 mEq ER tablet [id number] Yes Take 2 tablets (40 mEq) by mouth once daily. Bharti Romero MD   Active   SUMAtriptan (Imitrex) 50 mg tablet [id number] Yes Take 1 tablet (50 mg) by mouth 1 time if needed for migraine. May repeat dose once in 2 hours if no relief.  Do not exceed 2 doses in 24 hours. Holley Auguste MD  Active   triamterene-hydrochlorothiazid (Maxzide) 75-50 mg tablet [id number] Yes Take 1 tablet by mouth once daily. Bharti Romero MD  Active   VITAMIN E ACETATE ORAL [id number]  Take 1 tablet by mouth once daily. Historical Provider, MD  Active                  Social History:   Sonja Dallas  reports that she has quit smoking. Her smoking use included cigarettes. She has been exposed to tobacco smoke. She has never used smokeless tobacco.  reports current drug use. Drug: Marijuana.      Physical Exam  Not done since this was a virtual visit     LABS   Reviewed labs in her electronic records          Oleg Pascal MD    This was a Phone visit per patients request. Consent obtained from patient, who was at home in [facility] and Provider also located in [facility]. Start time 1110 AM and end time of 1126 AM

## 2025-01-03 ENCOUNTER — NURSE TRIAGE (OUTPATIENT)
Dept: HEMATOLOGY/ONCOLOGY | Facility: CLINIC | Age: 65
End: 2025-01-03
Payer: COMMERCIAL

## 2025-01-03 NOTE — TELEPHONE ENCOUNTER
Pt called and explained that she talk to Dr. Pascal Monday and he prescribed her a z-bubba. Pt explained the Z-bubba didn't help and she still feels sick. Headache, sore throat and coughing. Feels like she has a fever, but doesn't have a thermometer. Her PCP is out of the office. I explained that pt could go to urgent care, pt would like to see if Dr. Pascal could help rather then go to urgent care. Please advise.     Additional Information   Did you take any medications to bring your fever down? If yes, what did you take and when?     Took tylenol and helped   Commented on: What was the highest temperature you've had in the past 24 hours?     Feels she has a fever. Chills, getting very cold, getting very hot and sweating.   Commented on: When was the last time you took your temperature? What was it?     Doesn't have a thermometer    Protocols used: Fever/Chills/Infection

## 2025-01-03 NOTE — TELEPHONE ENCOUNTER
"Called pt back and explained per Dr. Pascal  \"I will need her to go to the urgent care to be evaluated especially since the prescribed antibiotics did not help her. Unfortunately our clinic is not open today\".  Pt will head to urgent care now. Patient verbalized understanding and agreement regarding discussed information via verbal feedback.        "

## 2025-01-10 ENCOUNTER — TELEPHONE (OUTPATIENT)
Dept: OPHTHALMOLOGY | Facility: CLINIC | Age: 65
End: 2025-01-10
Payer: COMMERCIAL

## 2025-01-10 NOTE — TELEPHONE ENCOUNTER
I tried calling patient on 1/7/25 snd 1/10/25 I left detail voice message asking Ms. Coxs to give me a call back to schedule appointment.

## 2025-01-13 ENCOUNTER — TELEPHONE (OUTPATIENT)
Dept: HEMATOLOGY/ONCOLOGY | Facility: HOSPITAL | Age: 65
End: 2025-01-13
Payer: COMMERCIAL

## 2025-01-13 NOTE — TELEPHONE ENCOUNTER
Pt's call returned. Pt upset that cataract surgery continues to be canceled. Pt wants to know if there is a reason she is not able to have her Surgery at the [place] location. This nurse advised pt to call her Ophthalmologist and discuss with their office. Pt states she feels as if the current Ophthalmologist that she is currently seeing does not want to do her surgery, continues to cancel and suggest pt goes to a [place] location. Options of calling the [place] location and requesting to speak to someone in the office in order to request a new provider was suggested to the pt. The pt acknowledged understating.

## 2025-01-14 ENCOUNTER — APPOINTMENT (OUTPATIENT)
Dept: OPHTHALMOLOGY | Facility: CLINIC | Age: 65
End: 2025-01-14
Payer: COMMERCIAL

## 2025-01-16 ENCOUNTER — TELEPHONE (OUTPATIENT)
Dept: HEMATOLOGY/ONCOLOGY | Facility: HOSPITAL | Age: 65
End: 2025-01-16
Payer: COMMERCIAL

## 2025-01-16 NOTE — TELEPHONE ENCOUNTER
Returned call to patient - no answer. Left VM     I called and spoke with Ophthalmology  assjordi Pan ([phone]) who has been trying to assist patient in scheduling her cataract surgery. Per Viviane - anesthesiology is requiring that patient's cataract surgery be preformed in a hospital setting. Her regular ophthalmologist Dr Tinoco has referred her to Dr Kumari to perform the surgery in the hospital setting which is required to be at the [place] location. Per Viviane, patient can call her at her direct line [phone] and she can get her an appt tomorrow to meet with Dr Kumari and sign consent and she can schedule the surgery for 2/3/25 a she has saved these appointments for this patient.    Await call back from patient.  Beth DOUGLASN, RN CMSRN

## 2025-01-16 NOTE — TELEPHONE ENCOUNTER
Sonja called back to return Beth's voicemail.      This RN relayed message above to patient.  Patient states she is waiting to hear from Viviane.  Advised patient that appointments are saved for her for tomorrow and 2/3 and she needs to schedule this with Viviane.  RN transferred patient to [facility]'s direct line.  Sonja denies additional questions/concerns at this time.

## 2025-01-16 NOTE — TELEPHONE ENCOUNTER
Attempted to call patient back - phone rings and goes straight to  (restricted call blocker?). When patient calls back please send her to the nurse line to have message relayed to her as I am unable to reach her by phone.   Beth DOUGLASN, RN CMSRN

## 2025-01-20 ENCOUNTER — HOSPITAL ENCOUNTER (OUTPATIENT)
Dept: GASTROENTEROLOGY | Facility: HOSPITAL | Age: 65
Discharge: HOME | End: 2025-01-20
Payer: COMMERCIAL

## 2025-01-20 ENCOUNTER — TELEPHONE (OUTPATIENT)
Dept: ADMISSION | Facility: HOSPITAL | Age: 65
End: 2025-01-20
Payer: COMMERCIAL

## 2025-01-20 VITALS
OXYGEN SATURATION: 100 % | SYSTOLIC BLOOD PRESSURE: 117 MMHG | DIASTOLIC BLOOD PRESSURE: 78 MMHG | HEART RATE: 81 BPM | RESPIRATION RATE: 16 BRPM

## 2025-01-20 DIAGNOSIS — K44.9 HIATAL HERNIA: ICD-10-CM

## 2025-01-20 DIAGNOSIS — D57.00 SICKLE CELL ANEMIA WITH PAIN (MULTI): ICD-10-CM

## 2025-01-20 PROCEDURE — 2500000005 HC RX 250 GENERAL PHARMACY W/O HCPCS: Performed by: SURGERY

## 2025-01-20 PROCEDURE — 91010 ESOPHAGUS MOTILITY STUDY: CPT

## 2025-01-20 RX ORDER — LIDOCAINE HYDROCHLORIDE 20 MG/ML
1 JELLY TOPICAL ONCE
Status: COMPLETED | OUTPATIENT
Start: 2025-01-20 | End: 2025-01-20

## 2025-01-20 RX ORDER — OXYCODONE HYDROCHLORIDE 10 MG/1
10 TABLET ORAL EVERY 6 HOURS PRN
Qty: 60 TABLET | Refills: 0 | Status: SHIPPED | OUTPATIENT
Start: 2025-01-20

## 2025-01-20 RX ADMIN — LIDOCAINE HYDROCHLORIDE 1 APPLICATION: 20 JELLY TOPICAL at 13:11

## 2025-01-20 ASSESSMENT — PAIN SCALES - GENERAL
PAINLEVEL_OUTOF10: 0 - NO PAIN
PAINLEVEL_OUTOF10: 0 - NO PAIN

## 2025-01-20 ASSESSMENT — PAIN - FUNCTIONAL ASSESSMENT
PAIN_FUNCTIONAL_ASSESSMENT: 0-10
PAIN_FUNCTIONAL_ASSESSMENT: 0-10

## 2025-01-20 NOTE — TELEPHONE ENCOUNTER
Pt is requesting a refill of oxycodone 10mg q6 prn, #60.  Last prescription was written 12/23/24.  Preferred pharmacy is [facility] in [facility].

## 2025-01-21 ENCOUNTER — APPOINTMENT (OUTPATIENT)
Dept: OPHTHALMOLOGY | Facility: CLINIC | Age: 65
End: 2025-01-21
Payer: COMMERCIAL

## 2025-01-21 DIAGNOSIS — H18.513 CORNEAL GUTTATA OF BOTH EYES: ICD-10-CM

## 2025-01-21 DIAGNOSIS — H25.812 COMBINED FORM OF AGE-RELATED CATARACT, LEFT EYE: Primary | ICD-10-CM

## 2025-01-21 DIAGNOSIS — H25.811 COMBINED FORMS OF AGE-RELATED CATARACT OF RIGHT EYE: ICD-10-CM

## 2025-01-21 DIAGNOSIS — H36.823 SICKLE CELL DISEASE WITHOUT CRISIS, WITH PROLIFERATIVE SICKLE CELL RETINOPATHY OF BOTH EYES (MULTI): ICD-10-CM

## 2025-01-21 DIAGNOSIS — D57.1 SICKLE CELL DISEASE WITHOUT CRISIS, WITH PROLIFERATIVE SICKLE CELL RETINOPATHY OF BOTH EYES (MULTI): ICD-10-CM

## 2025-01-21 LAB
CELLS COUNTED (OD): NORMAL CELLS/MM2
CELLS COUNTED (OS): NORMAL CELLS/MM2
CENTRAL CORNEA THICKNESS (OD): 508 MICRONS
CENTRAL CORNEA THICKNESS (OS): 511 MICRONS

## 2025-01-21 PROCEDURE — 99214 OFFICE O/P EST MOD 30 MIN: CPT | Performed by: OPHTHALMOLOGY

## 2025-01-21 PROCEDURE — 92286 ANT SGM IMG I&R SPECLR MIC: CPT | Performed by: OPHTHALMOLOGY

## 2025-01-21 PROCEDURE — 92025 CPTRIZED CORNEAL TOPOGRAPHY: CPT | Performed by: OPHTHALMOLOGY

## 2025-01-21 RX ORDER — TETRACAINE HYDROCHLORIDE 5 MG/ML
1 SOLUTION OPHTHALMIC ONCE
OUTPATIENT
Start: 2025-01-21 | End: 2025-01-21

## 2025-01-21 RX ORDER — MOXIFLOXACIN 5 MG/ML
1 SOLUTION/ DROPS OPHTHALMIC
OUTPATIENT
Start: 2025-01-21 | End: 2025-01-21

## 2025-01-21 RX ORDER — CYCLOPENTOLATE HYDROCHLORIDE 10 MG/ML
1 SOLUTION/ DROPS OPHTHALMIC
OUTPATIENT
Start: 2025-01-21 | End: 2025-01-21

## 2025-01-21 RX ORDER — TROPICAMIDE 10 MG/ML
1 SOLUTION/ DROPS OPHTHALMIC
OUTPATIENT
Start: 2025-01-21 | End: 2025-01-21

## 2025-01-21 RX ORDER — PHENYLEPHRINE HYDROCHLORIDE 25 MG/ML
1 SOLUTION/ DROPS OPHTHALMIC
OUTPATIENT
Start: 2025-01-21 | End: 2025-01-21

## 2025-01-21 ASSESSMENT — CONF VISUAL FIELD
OD_INFERIOR_TEMPORAL_RESTRICTION: 0
OS_SUPERIOR_TEMPORAL_RESTRICTION: 0
OS_INFERIOR_NASAL_RESTRICTION: 0
OD_INFERIOR_NASAL_RESTRICTION: 0
OD_SUPERIOR_TEMPORAL_RESTRICTION: 0
OD_NORMAL: 1
OD_SUPERIOR_NASAL_RESTRICTION: 0
OS_SUPERIOR_NASAL_RESTRICTION: 0
OS_INFERIOR_TEMPORAL_RESTRICTION: 0
OS_NORMAL: 1

## 2025-01-21 ASSESSMENT — EXTERNAL EXAM - RIGHT EYE: OD_EXAM: NORMAL

## 2025-01-21 ASSESSMENT — REFRACTION_MANIFEST
OS_ADD: +2.25
OD_SPHERE: +2.00
OS_CYLINDER: -1.75
OS_SPHERE: +1.25
OD_ADD: +2.25
OS_AXIS: 180
OD_CYLINDER: SPHERE

## 2025-01-21 ASSESSMENT — REFRACTION_WEARINGRX
OS_AXIS: 180
OD_CYLINDER: SPHERE
OS_SPHERE: +1.25
SPECS_TYPE: BIFOCAL
OS_ADD: +2.25
OD_ADD: +2.25
OD_SPHERE: +2.00
OS_CYLINDER: -1.75

## 2025-01-21 ASSESSMENT — ENCOUNTER SYMPTOMS: EYES NEGATIVE: 1

## 2025-01-21 ASSESSMENT — VISUAL ACUITY
OS_CC: 20/400
OD_CC: 20/30
METHOD: SNELLEN - LINEAR
CORRECTION_TYPE: GLASSES

## 2025-01-21 ASSESSMENT — TONOMETRY
IOP_METHOD: GOLDMANN APPLANATION
OD_IOP_MMHG: 14
OS_IOP_MMHG: 14

## 2025-01-21 ASSESSMENT — CUP TO DISC RATIO
OD_RATIO: .1
OS_RATIO: .1

## 2025-01-21 ASSESSMENT — EXTERNAL EXAM - LEFT EYE: OS_EXAM: NORMAL

## 2025-01-21 NOTE — PROGRESS NOTES
Assessment/Plan   Diagnoses and all orders for this visit:  Combined form of age-related cataract, left eye  Combined form of age-related cataract, left eyeH25.812  Visually significant. Pt would like to proceed with surgery.    Visually significant cataract OS. BCVA: 20/400. Symptoms: blurry vision, glare. A change in glasses prescription will not result in significant visual improvement at this time.  Indication for cataract surgery: Input To potentially improve visual acuity and improve quality of life/reduce symptoms.   Based on a comprehensive eye exam performed today, a visually significant cataract appears to be the source of decreased vision, diminished quality of life, and impairment of activities of daily living. Discussed option of cataract surgery vs observation. Patient can no longer function adequately with current best corrected visual acuity and wishes to have cataract surgery at this time. Discussed surgical procedure with patient. As a result of cataract extraction, it is believed that the patient will experience improved vision. Discussed potential risks, benefits, and complications of cataract surgery including but not limited to pain, bleeding, infection, inflammation, edema, increased eye pressure, retinal tear/detachment, lens dislocation, ptosis, iris damage, need for additional surgery, need for glasses after surgery, loss of vision/loss of eye. Patient understands and wishes to proceed. All questions were answered. Will schedule cataract surgery OS. Lenstar done today.   Discussed IOL options (standard monofocal, monofocal with monovision, toric, multifocal). Lens chosen: standard monofocal. Defer/decline toric/multifocal lens at this time. Had thorough discussion with patient re: aim. Discussed that may potentially need glasses for best vision both at distance and at near.     Schedule cataract surgery OS  I personally reviewed the lenstar measurements and will choose the lens  accordingly.  Combined forms of age-related cataract of right eye  Mildly Visually significant  Monitor  Corneal guttata of both eyes  -     Corneal Topography - OU - Both Eyes  -     Endothelial Photo and Cell Count - OU - Both Eyes  Discussed with pt higher likelihood of needing EK after CE than the general population due FECD. Also, explained delayed vision recovery due to early k edema. The patient understands.   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 Villasenor) for VH

## 2025-01-27 ENCOUNTER — LAB (OUTPATIENT)
Dept: LAB | Facility: HOSPITAL | Age: 65
End: 2025-01-27
Payer: COMMERCIAL

## 2025-01-27 ENCOUNTER — OFFICE VISIT (OUTPATIENT)
Dept: HEMATOLOGY/ONCOLOGY | Facility: HOSPITAL | Age: 65
End: 2025-01-27
Payer: COMMERCIAL

## 2025-01-27 VITALS
DIASTOLIC BLOOD PRESSURE: 61 MMHG | SYSTOLIC BLOOD PRESSURE: 117 MMHG | RESPIRATION RATE: 22 BRPM | TEMPERATURE: 98.2 F | BODY MASS INDEX: 25.77 KG/M2 | WEIGHT: 150.13 LBS | OXYGEN SATURATION: 100 % | HEART RATE: 96 BPM

## 2025-01-27 DIAGNOSIS — G47.34 NOCTURNAL HYPOXIA: ICD-10-CM

## 2025-01-27 DIAGNOSIS — G89.4 CHRONIC PAIN SYNDROME: Primary | ICD-10-CM

## 2025-01-27 DIAGNOSIS — D57.00 SICKLE CELL DISEASE WITH CRISIS (MULTI): ICD-10-CM

## 2025-01-27 LAB
25(OH)D3 SERPL-MCNC: 78 NG/ML (ref 30–100)
ALBUMIN SERPL BCP-MCNC: 4.5 G/DL (ref 3.4–5)
ALP SERPL-CCNC: 68 U/L (ref 33–136)
ALT SERPL W P-5'-P-CCNC: 20 U/L (ref 7–45)
AMPHETAMINES UR QL SCN: ABNORMAL
ANION GAP SERPL CALC-SCNC: 12 MMOL/L (ref 10–20)
AST SERPL W P-5'-P-CCNC: 25 U/L (ref 9–39)
BARBITURATES UR QL SCN: ABNORMAL
BASOPHILS # BLD AUTO: 0.03 X10*3/UL (ref 0–0.1)
BASOPHILS NFR BLD AUTO: 0.6 %
BENZODIAZ UR QL SCN: ABNORMAL
BILIRUB SERPL-MCNC: 1.4 MG/DL (ref 0–1.2)
BNP SERPL-MCNC: 9 PG/ML (ref 0–99)
BUN SERPL-MCNC: 9 MG/DL (ref 6–23)
BZE UR QL SCN: ABNORMAL
CALCIUM SERPL-MCNC: 9.8 MG/DL (ref 8.6–10.3)
CANNABINOIDS UR QL SCN: ABNORMAL
CHLORIDE SERPL-SCNC: 100 MMOL/L (ref 98–107)
CO2 SERPL-SCNC: 28 MMOL/L (ref 21–32)
CREAT SERPL-MCNC: 0.96 MG/DL (ref 0.5–1.05)
CREAT UR-MCNC: 41.8 MG/DL (ref 20–320)
EGFRCR SERPLBLD CKD-EPI 2021: 67 ML/MIN/1.73M*2
EOSINOPHIL # BLD AUTO: 0.21 X10*3/UL (ref 0–0.7)
EOSINOPHIL NFR BLD AUTO: 4.2 %
ERYTHROCYTE [DISTWIDTH] IN BLOOD BY AUTOMATED COUNT: 14.9 % (ref 11.5–14.5)
FENTANYL+NORFENTANYL UR QL SCN: ABNORMAL
FERRITIN SERPL-MCNC: 118 NG/ML (ref 8–150)
GLUCOSE SERPL-MCNC: 91 MG/DL (ref 74–99)
HCT VFR BLD AUTO: 29.4 % (ref 36–46)
HGB BLD-MCNC: 10.1 G/DL (ref 12–16)
HGB RETIC QN: 30 PG (ref 28–38)
IMM GRANULOCYTES # BLD AUTO: 0.01 X10*3/UL (ref 0–0.7)
IMM GRANULOCYTES NFR BLD AUTO: 0.2 % (ref 0–0.9)
IMMATURE RETIC FRACTION: 24.3 %
LDH SERPL L TO P-CCNC: 184 U/L (ref 84–246)
LYMPHOCYTES # BLD AUTO: 0.93 X10*3/UL (ref 1.2–4.8)
LYMPHOCYTES NFR BLD AUTO: 18.6 %
MCH RBC QN AUTO: 27.7 PG (ref 26–34)
MCHC RBC AUTO-ENTMCNC: 34.4 G/DL (ref 32–36)
MCV RBC AUTO: 81 FL (ref 80–100)
METHADONE UR QL SCN: ABNORMAL
MICROALBUMIN UR-MCNC: <7 MG/L
MICROALBUMIN/CREAT UR: NORMAL MG/G{CREAT}
MONOCYTES # BLD AUTO: 0.39 X10*3/UL (ref 0.1–1)
MONOCYTES NFR BLD AUTO: 7.8 %
NEUTROPHILS # BLD AUTO: 3.44 X10*3/UL (ref 1.2–7.7)
NEUTROPHILS NFR BLD AUTO: 68.6 %
NRBC BLD-RTO: 0 /100 WBCS (ref 0–0)
OPIATES UR QL SCN: ABNORMAL
OXYCODONE+OXYMORPHONE UR QL SCN: ABNORMAL
PCP UR QL SCN: ABNORMAL
PLATELET # BLD AUTO: 184 X10*3/UL (ref 150–450)
POTASSIUM SERPL-SCNC: 3.4 MMOL/L (ref 3.5–5.3)
PROT SERPL-MCNC: 7.1 G/DL (ref 6.4–8.2)
RBC # BLD AUTO: 3.64 X10*6/UL (ref 4–5.2)
RETICS #: 0.15 X10*6/UL (ref 0.02–0.08)
RETICS/RBC NFR AUTO: 4.1 % (ref 0.5–2)
SODIUM SERPL-SCNC: 137 MMOL/L (ref 136–145)
WBC # BLD AUTO: 5 X10*3/UL (ref 4.4–11.3)

## 2025-01-27 PROCEDURE — 99214 OFFICE O/P EST MOD 30 MIN: CPT | Performed by: PEDIATRICS

## 2025-01-27 PROCEDURE — 83615 LACTATE (LD) (LDH) ENZYME: CPT

## 2025-01-27 PROCEDURE — 80053 COMPREHEN METABOLIC PANEL: CPT

## 2025-01-27 PROCEDURE — 82728 ASSAY OF FERRITIN: CPT

## 2025-01-27 PROCEDURE — 3074F SYST BP LT 130 MM HG: CPT | Performed by: PEDIATRICS

## 2025-01-27 PROCEDURE — 80307 DRUG TEST PRSMV CHEM ANLYZR: CPT

## 2025-01-27 PROCEDURE — 83880 ASSAY OF NATRIURETIC PEPTIDE: CPT

## 2025-01-27 PROCEDURE — 82306 VITAMIN D 25 HYDROXY: CPT

## 2025-01-27 PROCEDURE — 1036F TOBACCO NON-USER: CPT | Performed by: PEDIATRICS

## 2025-01-27 PROCEDURE — 85045 AUTOMATED RETICULOCYTE COUNT: CPT

## 2025-01-27 PROCEDURE — 80349 CANNABINOIDS NATURAL: CPT

## 2025-01-27 PROCEDURE — 85025 COMPLETE CBC W/AUTO DIFF WBC: CPT

## 2025-01-27 PROCEDURE — 82043 UR ALBUMIN QUANTITATIVE: CPT

## 2025-01-27 PROCEDURE — 36415 COLL VENOUS BLD VENIPUNCTURE: CPT

## 2025-01-27 PROCEDURE — 80365 DRUG SCREENING OXYCODONE: CPT

## 2025-01-27 PROCEDURE — 3078F DIAST BP <80 MM HG: CPT | Performed by: PEDIATRICS

## 2025-01-27 ASSESSMENT — PAIN SCALES - GENERAL: PAINLEVEL_OUTOF10: 0-NO PAIN

## 2025-01-28 ENCOUNTER — PRE-ADMISSION TESTING (OUTPATIENT)
Dept: PREADMISSION TESTING | Facility: HOSPITAL | Age: 65
End: 2025-01-28
Payer: COMMERCIAL

## 2025-01-28 VITALS
WEIGHT: 150.35 LBS | BODY MASS INDEX: 25.67 KG/M2 | HEART RATE: 86 BPM | TEMPERATURE: 96.8 F | HEIGHT: 64 IN | SYSTOLIC BLOOD PRESSURE: 113 MMHG | OXYGEN SATURATION: 100 % | DIASTOLIC BLOOD PRESSURE: 71 MMHG | RESPIRATION RATE: 20 BRPM

## 2025-01-28 DIAGNOSIS — Z01.818 PREOP TESTING: Primary | ICD-10-CM

## 2025-01-28 PROCEDURE — 99204 OFFICE O/P NEW MOD 45 MIN: CPT

## 2025-01-28 ASSESSMENT — PAIN SCALES - GENERAL: PAINLEVEL_OUTOF10: 0 - NO PAIN

## 2025-01-28 ASSESSMENT — ENCOUNTER SYMPTOMS
CONSTIPATION: 0
ABDOMINAL PAIN: 0
SORE THROAT: 0
LIGHT-HEADEDNESS: 0
DIZZINESS: 0
PALPITATIONS: 0
FEVER: 0
FATIGUE: 1
SCLERAL ICTERUS: 0
SEIZURES: 0
HEADACHES: 1
FLANK PAIN: 0
COUGH: 0
LEG SWELLING: 0
SHORTNESS OF BREATH: 0
NERVOUS/ANXIOUS: 0
DIARRHEA: 0
DEPRESSION: 0
WHEEZING: 0
HEMOPTYSIS: 0
BACK PAIN: 1
EYE PROBLEMS: 1
MYALGIAS: 1
BRUISES/BLEEDS EASILY: 0
CHEST TIGHTNESS: 0
EXTREMITY WEAKNESS: 0
NUMBNESS: 0
ARTHRALGIAS: 1

## 2025-01-28 ASSESSMENT — DUKE ACTIVITY SCORE INDEX (DASI)
CAN YOU DO LIGHT WORK AROUND THE HOUSE LIKE DUSTING OR WASHING DISHES: YES
CAN YOU DO HEAVY WORK AROUND THE HOUSE LIKE SCRUBBING FLOORS OR LIFTING AND MOVING HEAVY FURNITURE: NO
CAN YOU WALK INDOORS, SUCH AS AROUND YOUR HOUSE: YES
CAN YOU DO MODERATE WORK AROUND THE HOUSE LIKE VACUUMING, SWEEPING FLOORS OR CARRYING GROCERIES: YES
CAN YOU PARTICIPATE IN STRENOUS SPORTS LIKE SWIMMING, SINGLES TENNIS, FOOTBALL, BASKETBALL, OR SKIING: NO
CAN YOU PARTICIPATE IN MODERATE RECREATIONAL ACTIVITIES LIKE GOLF, BOWLING, DANCING, DOUBLES TENNIS OR THROWING A BASEBALL OR FOOTBALL: NO
TOTAL_SCORE: 32.2
CAN YOU DO YARD WORK LIKE RAKING LEAVES, WEEDING OR PUSHING A MOWER: NO
CAN YOU CLIMB A FLIGHT OF STAIRS OR WALK UP A HILL: YES
CAN YOU RUN A SHORT DISTANCE: YES
DASI METS SCORE: 6.7
CAN YOU TAKE CARE OF YOURSELF (EAT, DRESS, BATHE, OR USE TOILET): YES
CAN YOU WALK A BLOCK OR TWO ON LEVEL GROUND: YES
CAN YOU HAVE SEXUAL RELATIONS: YES

## 2025-01-28 ASSESSMENT — PAIN - FUNCTIONAL ASSESSMENT: PAIN_FUNCTIONAL_ASSESSMENT: 0-10

## 2025-01-28 NOTE — PREPROCEDURE INSTRUCTIONS
Medication List            Accurate as of January 28, 2025  1:36 PM. Always use your most recent med list.                buPROPion  mg 24 hr tablet  Commonly known as: Wellbutrin XL  Take 1 tablet (300 mg) by mouth once daily.  Medication Adjustments for Surgery: Take/Use as prescribed     butalbital-acetaminophen-caff -40 mg capsule  Commonly known as: Fioricet  Take 1 capsule by mouth every 6 hours if needed for headaches (migraine headaches).  Medication Adjustments for Surgery: Take/Use as prescribed     ferrous sulfate (325 mg ferrous sulfate) tablet  Medication Adjustments for Surgery: Take/Use as prescribed     FLAXSEED OIL ORAL  Medication Adjustments for Surgery: Take/Use as prescribed     folic acid 1 mg tablet  Commonly known as: Folvite  Medication Adjustments for Surgery: Take/Use as prescribed     HAIR-SKIN-NAILS (PABA) ORAL  Medication Adjustments for Surgery: Take/Use as prescribed     MULTIPLE VITAMINS ORAL  Medication Adjustments for Surgery: Take/Use as prescribed     naloxone 4 mg/0.1 mL nasal spray  Commonly known as: Narcan  Administer 1 spray (4 mg) into affected nostril(s) if needed for opioid reversal. May repeat every 2-3 minutes if needed, alternating nostrils, until medical assistance becomes available.  Medication Adjustments for Surgery: Take/Use as prescribed     omeprazole 40 mg DR capsule  Commonly known as: PriLOSEC  Take 1 capsule (40 mg) by mouth once daily in the morning.  Medication Adjustments for Surgery: Take/Use as prescribed     oxyCODONE 10 mg immediate release tablet  Commonly known as: Roxicodone  Take 1 tablet (10 mg) by mouth every 6 hours if needed for severe pain (7 - 10).  Medication Adjustments for Surgery: Take/Use as prescribed     potassium chloride CR 20 mEq ER tablet  Commonly known as: Klor-Con M20  Take 2 tablets (40 mEq) by mouth once daily.  Medication Adjustments for Surgery: Take/Use as prescribed     SUMAtriptan 50 mg tablet  Commonly  known as: Imitrex  Take 1 tablet (50 mg) by mouth 1 time if needed for migraine. May repeat dose once in 2 hours if no relief.  Do not exceed 2 doses in 24 hours.  Medication Adjustments for Surgery: Take/Use as prescribed     triamterene-hydrochlorothiazid 75-50 mg tablet  Commonly known as: Maxzide  Take 1 tablet by mouth once daily.  Medication Adjustments for Surgery: Take/Use as prescribed     Vitamin D3 10 MCG (400 UNIT) tablet  Generic drug: cholecalciferol  Medication Adjustments for Surgery: Take/Use as prescribed                              NPO Instructions:    Do not eat any food after midnight the night before your surgery/procedure.    Additional Instructions:     Day of Surgery:  Wear  comfortable loose fitting clothing  Do not use moisturizers, creams, lotions or perfume  All jewelry and valuables should be left at home        PRE-OPERATIVE INSTRUCTIONS FOR SURGERY    *Do not eat anything after midnight the night of surgery.  This includes food of any kind (including hard candy, cough drops, mints).   You may have up to 13 ounces of clear liquid  until TWO hours prior to your scheduled surgery time, unless ERAS* protocol is ordered for you.  Clear liquids include water, black tea/coffee, (no milk or cream) apple juice and electrolyte drinks (GATORADE).  You may chew gum until TWO hours prior you your surgery/procedure.     *ERAS protocol: follow as instructed.  DO not drink an additional 13 ounces as noted above.        *One of our staff members will call you ONE business day before your surgery, between 11 am-2 pm to let you know the time to arrive.  If you have not received a call by 2 pm, call [phone]  *When you arrive at the hospital-->GO TO Registration on the ground floor  *Stop smoking 24 hours prior to surgery.  No Marijuana, CBD Oil or Vaping for 48 hours  *No alcohol 24 hours prior to surgery  *You will need a responsible adult to drive you home  -No acrylic nails or nail polish on at  least one fingernail, NO polish on toes for foot surgery  -You may be asked to remove your dentures, partial plate, eyeglasses or contact lenses before going to surgery.  Please bring a case for these items.  -Body piercings need to be removed.  Jewelry and valuables should be left at home.  -Put on loose,  comfortable, clean clothing, that will accommodate bandages        What is a home antibacterial shower?  This shower is a way of cleaning the skin with a germ killing solution before surgery.  The solution contains chlorhexidine, commonly known as CHG.  CHG is a skin cleanser with germ killing ability.  Let your doctor know if you are allergic to chlorhexidine.    Why do I need to take a preoperative antibacterial shower?  Skin is not sterile.  It is best to try to make your skin as free of germs as possible before surgery.  Proper cleansing with a germ killing soap before surgery can lower the number of germs on your skin.  This helps to reduce the risk of infection at the surgical site.  Following the instructions listed below will help you prepare your skin for surgery.      How do I use the solution?    Steps: Begin using your CHG soap 5 days before your surgery on __________________.    *First, wash and rinse your hair using the CHG soap.  Keep CHG soap away from ear canals and eyes.   Rinse completely, do not condition.  Hair extensions should be removed.    *Wash your face with your normal soap and rinse.   *Apply the CHG solution to a clean wet washcloth.  Turn the water off or move away from the water spray to avoid premature rinsing of the CHG soap as you are applying.  Firmly lather your entire body from the neck down.  Do not use on your face.    *Pay special attention to the area(s) where your incision(s) will be located unless they are on your face.  Avoid scrubbing your skin too hard.  The important part is to have the CHG soap sit on your skin for 3 minutes.   *When the 3 minutes are up, turn on  the water and rinse the CHG solution off your body completely.  *Do not wash with regular soap after you  have  used the CHG soap solution.  *Pat  yourself dry with a clean, freshly laundered towel.  *Do not apply powders, deodorants or lotions.  *Dress in clean freshly laundered night clothes.    *Be sure to sleep with clean freshly laundered sheets.    *Be aware the CHG will cause stains on fabrics; if you wash them with bleach after use.  Rinse your washcloth and other linens that have contact with CHG completely.  Use only non-chlorine detergents to launder the items  used.  *The morning of surgery is the fifth day.  Repeat the above steps and dress in clean comfortable clothing.     What is oral/dental rinse?  It is mouthwash.  It is a way of cleaning the he mouth with a germ-killing solution before your surgery.  The solution contains chlorhexidine, commonly known as CHG.  It is used inside the mouth to kill a bacteria known as Staphylococcus aureus.  Let your doctor know if you are allergic to Chlorhexidine.    Why do I need to use CHG oral/ dental rinse?  The CHG oral/dental rinse helps to kill bacteria in your mouth know as Staphylococcus aureus.  This reduces the risk of infection at the surgical site.    Using your CHG oral/dental rinse    STEPS:    Use your CHG oral/dental rinse after you brush your teeth the night before (at bedtime) and the morning of your surgery.  Follow all the directions on your prescription label.  *Use the cap on the container to measure 15 ml  *Swish (gargle if you can) the mouthwash in your mouth for at least 30 seconds, (do not swallow) and spit out  *After you use your CHG rinse, do not rinse your mouth with water, drink or eat.  Please refer to the prescription label for the appropriate time to resume oral intake.    What side effects might I have using the CHG oral/dental rinse?  CHG rinse will stick you plaque on the teeth.  Brush and floss just before use.   Teeth  brushing will help avoid staining of the plaque during  use.  Teeth brushing will help avoid staining of plaque during  use.    Who should I contact if I have questions about the CHG oral/dental rinse and or CHG soap?  Please call [facility], Pre-Admission testing at [phone] if you have any questions.    What you may be asked to bring to surgery:  ___Crutches, walker  ___CPAP machine  ___Urine specimen

## 2025-01-28 NOTE — PROGRESS NOTES
[facility] OUTPATIENT NOTE  Patient ID: Sonja aDllas   Visit Type: Follow up visit     ASSESSMENT AND PLAN  Sonja Dallas is 64 y.o. female with    History of Hb S beta plus thalassemia  Chronic pain, secondary to bilateral AVN. She is status post bilateral hip and right shoulder replacement and is due for a revision of left hip replacement   History of cataracts and scheduled for surgery on 2/3/25. She also has history of bilateral sickle cell retinopathy  Mild anemia with hemoglobin of 10.1 g/dL, which is around her baseline  MDD, well controlled   Nocturnal hypoxia on supplemental night time oxygen   History of migraine headaches  Hypertension, currently well-controlled    PLAN   - Cataract surgery may proceed as scheduled.  Red cell exchange transfusion, is currently not indicated  - No changes in her current pain regimen and she will continue to receive   Oral tylenol and or ibuprofen as needed for mild to moderate pain   Oral oxycodone 5-10 mg every 4-6 hours prn for moderate, severe and breakthrough pain  Flexeril as needed as muscle relaxant   Non pharmacologic methods of pain control   Reviewed [facility]  - Wellbutrin  mg daily and continue to follow up with her therapist    - Daily folic acid   - Continue supplemental night time oxygen   -Continue current antihypertensive with Maxzide 75/50 mg tablet once daily  -Next office visit will be in person in 12 weeks     Chief Complaint: Follow up for Hb S beta thal SCD      Interval History: Sonja Dallas is present an office visit today. She denies recent admissions to hospital or ED visits. Recent URI has resolved and she denies cough, fever, runny nose or congestion.  Decreased vision is unchanged, and she is scheduled for cataract surgery in about a week's time..  She continues to have chronic pain which is controlled with the current home oral regimen.  Migraine headaches are also well-controlled.  She is scheduled for revision of the left hip  arthroplasty in the next few weeks.  Her mood and affect at her baseline, she has not had any focal neurologic deficits.  She denies bilateral peripheral edema, or leg ulcers    Review of System:   Review of Systems   Constitutional:  Positive for fatigue. Negative for fever.   HENT:   Negative for mouth sores, nosebleeds and sore throat.    Eyes:  Positive for eye problems. Negative for icterus.   Respiratory:  Negative for chest tightness, cough, hemoptysis, shortness of breath and wheezing.    Cardiovascular:  Negative for chest pain, leg swelling and palpitations.   Gastrointestinal:  Negative for abdominal pain, constipation and diarrhea.   Musculoskeletal:  Positive for arthralgias, back pain and myalgias. Negative for flank pain and gait problem.   Neurological:  Positive for headaches. Negative for dizziness, extremity weakness, gait problem, light-headedness, numbness and seizures.   Hematological:  Does not bruise/bleed easily.   Psychiatric/Behavioral:  Negative for depression. The patient is not nervous/anxious.       Allergies:   Allergies   Allergen Reactions    Gabapentin Other     Abdominal pain    Gloves, Latex With Aloe Vera Swelling     Latex Gloves MISC    Latex Swelling     gloves    Morphine Unknown     itching    Naproxen Unknown       Current Medications:   Outpatient Encounter Medications as of 1/27/2025   Medication Sig    buPROPion XL (Wellbutrin XL) 300 mg 24 hr tablet Take 1 tablet (300 mg) by mouth once daily.    butalbital-acetaminophen-caff (Fioricet) -40 mg capsule Take 1 capsule by mouth every 6 hours if needed for headaches (migraine headaches).    cholecalciferol (Vitamin D3) 10 MCG (400 UNIT) tablet Take 1 tablet (10 mcg) by mouth once daily.    ferrous sulfate, 325 mg ferrous sulfate, tablet Take 1 tablet (325 mg) by mouth once daily.    FLAXSEED OIL ORAL Take with water in when needed    folic acid (Folvite) 1 mg tablet Take 1 tablet (1 mg) by mouth once daily.     multivitamin (MULTIPLE VITAMINS ORAL) Take by mouth once daily.    mv,trey,iron,mn/folic acid/chol (HAIR-SKIN-NAILS, PABA, ORAL) Take by mouth.    naloxone (Narcan) 4 mg/0.1 mL nasal spray Administer 1 spray (4 mg) into affected nostril(s) if needed for opioid reversal. May repeat every 2-3 minutes if needed, alternating nostrils, until medical assistance becomes available.    omeprazole (PriLOSEC) 40 mg DR capsule Take 1 capsule (40 mg) by mouth once daily in the morning.    oxyCODONE (Roxicodone) 10 mg immediate release tablet Take 1 tablet (10 mg) by mouth every 6 hours if needed for severe pain (7 - 10).    potassium chloride CR 20 mEq ER tablet Take 2 tablets (40 mEq) by mouth once daily.    triamterene-hydrochlorothiazid (Maxzide) 75-50 mg tablet Take 1 tablet by mouth once daily.    SUMAtriptan (Imitrex) 50 mg tablet Take 1 tablet (50 mg) by mouth 1 time if needed for migraine. May repeat dose once in 2 hours if no relief.  Do not exceed 2 doses in 24 hours.     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), Opioid use, unspecified with withdrawal (Multi) (05/06/2013), Other conditions influencing health status (06/18/2012), Other conditions influencing health status (12/04/2012), Personal history of diseases of the blood and blood-forming organs and certain disorders involving the immune mechanism, Personal history of diseases of the blood and blood-forming organs and certain disorders involving the immune mechanism (06/18/2012), Personal history of other diseases of the  circulatory system, Personal history of other diseases of the musculoskeletal system and connective tissue, Personal history of other diseases of the respiratory system, Personal history of other endocrine, nutritional and metabolic disease, Personal history of other infectious and parasitic diseases (01/21/2014), Personal history of other malignant neoplasm of bronchus and lung (04/15/2018), Personal history of other medical treatment (10/11/2019), Personal history of other mental and behavioral disorders (04/14/2013), Personal history of other specified conditions, Personal history of other specified conditions, Personal history of other specified conditions (04/04/2013), and Puckering of macula, left eye (06/18/2015).    Past Surgical History:    has a past surgical history that includes Total hip arthroplasty (06/18/2012); Other surgical history (06/18/2012); Total hip arthroplasty (12/04/2012); Other surgical history (09/21/2017); Other surgical history (04/20/2015); Gallbladder surgery (09/28/2015); Total shoulder arthroplasty (07/24/2018); and Lung lobectomy (08/04/2012).    Family History:   No family history on file.    Social History:   Sonja Dallas  reports that she has quit smoking. Her smoking use included cigarettes. She has been exposed to tobacco smoke. She has never used smokeless tobacco.  reports current drug use. Drug: Marijuana.    EXAMINATION FINDINGS   /61 (BP Location: Left arm, Patient Position: Sitting, BP Cuff Size: Adult)   Pulse 96   Temp 36.8 °C (98.2 °F) (Temporal)   Resp 22   Wt 68.1 kg (150 lb 2.1 oz)   LMP  (LMP Unknown)   SpO2 100%   BMI 25.77 kg/m²   1.75 meters squared    Physical Exam  Vitals and nursing note reviewed.   Constitutional:       General: She is not in acute distress.     Appearance: Normal appearance. She is normal weight. She is not ill-appearing.   HENT:      Nose: Nose normal. No congestion or rhinorrhea.      Mouth/Throat:      Mouth: Mucous  membranes are moist.      Pharynx: Oropharynx is clear. No oropharyngeal exudate or posterior oropharyngeal erythema.   Eyes:      General: No scleral icterus.     Conjunctiva/sclera: Conjunctivae normal.      Comments: Cataract visible    Cardiovascular:      Rate and Rhythm: Normal rate and regular rhythm.      Pulses: Normal pulses.      Heart sounds: Normal heart sounds. No murmur heard.     No gallop.   Pulmonary:      Effort: Pulmonary effort is normal. No respiratory distress.      Breath sounds: Normal breath sounds. No wheezing or rales.   Abdominal:      General: Bowel sounds are normal.      Palpations: Abdomen is soft.      Tenderness: There is no abdominal tenderness. There is no guarding.   Musculoskeletal:         General: No swelling or deformity. Normal range of motion.      Cervical back: Normal range of motion and neck supple.      Right lower leg: No edema.      Left lower leg: No edema.   Skin:     General: Skin is warm.      Capillary Refill: Capillary refill takes less than 2 seconds.      Coloration: Skin is not jaundiced.      Findings: No bruising.   Neurological:      General: No focal deficit present.      Mental Status: She is alert and oriented to person, place, and time. Mental status is at baseline.      Cranial Nerves: No cranial nerve deficit.      Motor: No weakness.      Gait: Gait normal.   Psychiatric:         Mood and Affect: Mood normal.         Behavior: Behavior normal.     LABS   Lab on 01/27/2025   Component Date Value Ref Range Status    Albumin, Urine Random 01/27/2025 <7.0  Not established mg/L Final    Creatinine, Urine Random 01/27/2025 41.8  20.0 - 320.0 mg/dL Final    Albumin/Creatinine Ratio 01/27/2025    Final    One or more analytes used in this calculation is outside of the analytical measurement range. Calculation cannot be performed.    BNP 01/27/2025 9  0 - 99 pg/mL Final    WBC 01/27/2025 5.0  4.4 - 11.3 x10*3/uL Final    nRBC 01/27/2025 0.0  0.0 - 0.0 /100  WBCs Final    RBC 01/27/2025 3.64 (L)  4.00 - 5.20 x10*6/uL Final    Hemoglobin 01/27/2025 10.1 (L)  12.0 - 16.0 g/dL Final    Hematocrit 01/27/2025 29.4 (L)  36.0 - 46.0 % Final    MCV 01/27/2025 81  80 - 100 fL Final    MCH 01/27/2025 27.7  26.0 - 34.0 pg Final    MCHC 01/27/2025 34.4  32.0 - 36.0 g/dL Final    RDW 01/27/2025 14.9 (H)  11.5 - 14.5 % Final    Platelets 01/27/2025 184  150 - 450 x10*3/uL Final    Neutrophils % 01/27/2025 68.6  40.0 - 80.0 % Final    Immature Granulocytes %, Automated 01/27/2025 0.2  0.0 - 0.9 % Final    Immature Granulocyte Count (IG) includes promyelocytes, myelocytes and metamyelocytes but does not include bands. Percent differential counts (%) should be interpreted in the context of the absolute cell counts (cells/UL).    Lymphocytes % 01/27/2025 18.6  13.0 - 44.0 % Final    Monocytes % 01/27/2025 7.8  2.0 - 10.0 % Final    Eosinophils % 01/27/2025 4.2  0.0 - 6.0 % Final    Basophils % 01/27/2025 0.6  0.0 - 2.0 % Final    Neutrophils Absolute 01/27/2025 3.44  1.20 - 7.70 x10*3/uL Final    Percent differential counts (%) should be interpreted in the context of the absolute cell counts (cells/uL).    Immature Granulocytes Absolute, Au* 01/27/2025 0.01  0.00 - 0.70 x10*3/uL Final    Lymphocytes Absolute 01/27/2025 0.93 (L)  1.20 - 4.80 x10*3/uL Final    Monocytes Absolute 01/27/2025 0.39  0.10 - 1.00 x10*3/uL Final    Eosinophils Absolute 01/27/2025 0.21  0.00 - 0.70 x10*3/uL Final    Basophils Absolute 01/27/2025 0.03  0.00 - 0.10 x10*3/uL Final    Glucose 01/27/2025 91  74 - 99 mg/dL Final    Sodium 01/27/2025 137  136 - 145 mmol/L Final    Potassium 01/27/2025 3.4 (L)  3.5 - 5.3 mmol/L Final    Chloride 01/27/2025 100  98 - 107 mmol/L Final    Bicarbonate 01/27/2025 28  21 - 32 mmol/L Final    Anion Gap 01/27/2025 12  10 - 20 mmol/L Final    Urea Nitrogen 01/27/2025 9  6 - 23 mg/dL Final    Creatinine 01/27/2025 0.96  0.50 - 1.05 mg/dL Final    eGFR 01/27/2025 67  >60  mL/min/1.73m*2 Final    Calculations of estimated GFR are performed using the 2021 CKD-EPI Study Refit equation without the race variable for the IDMS-Traceable creatinine methods.  https://jasn.asnjournals.org/content/early/2021/09/22/ASN.8800132067    Calcium 01/27/2025 9.8  8.6 - 10.3 mg/dL Final    Albumin 01/27/2025 4.5  3.4 - 5.0 g/dL Final    Alkaline Phosphatase 01/27/2025 68  33 - 136 U/L Final    Total Protein 01/27/2025 7.1  6.4 - 8.2 g/dL Final    AST 01/27/2025 25  9 - 39 U/L Final    Bilirubin, Total 01/27/2025 1.4 (H)  0.0 - 1.2 mg/dL Final    ALT 01/27/2025 20  7 - 45 U/L Final    Patients treated with Sulfasalazine may generate falsely decreased results for ALT.    Amphetamine Screen, Urine 01/27/2025 Presumptive Negative  Presumptive Negative Final    CUTOFF LEVEL: 500 NG/ML   Cross-reactivity has been reported with high concentrations   of the following drugs: buproprion, chloroquine, chlorpromazine,   ephedrine, mephentermine, fenfluramine, phentermine,   phenylpropanolamine, pseudoephedrine, and propranolol.    Barbiturate Screen, Urine 01/27/2025 Presumptive Negative  Presumptive Negative Final    CUTOFF LEVEL: 200 NG/ML    Benzodiazepines Screen, Urine 01/27/2025 Presumptive Negative  Presumptive Negative Final    CUTOFF LEVEL: 200 NG/ML    Cannabinoid Screen, Urine 01/27/2025 Presumptive Positive (A)  Presumptive Negative Final    CUTOFF LEVEL: 50 NG/ML    Cocaine Metabolite Screen, Urine 01/27/2025 Presumptive Negative  Presumptive Negative Final    CUTOFF LEVEL: 150 NG/ML    Fentanyl Screen, Urine 01/27/2025 Presumptive Negative  Presumptive Negative Final    CUTOFF LEVEL: 5 NG/ML    Opiate Screen, Urine 01/27/2025 Presumptive Negative  Presumptive Negative Final    CUTOFF LEVEL: 300 NG/ML  The opiate screen does not detect fentanyl, meperidine, or   tramadol. Oxycodone is not consistently detected (refer to  Oxycodone Screen, Urine result).    Oxycodone Screen, Urine 01/27/2025 Presumptive  "Positive (A)  Presumptive Negative Final    CUTOFF LEVEL: 100 NG/ML  This test will accurately detect both oxycodone and oxymorphone.    PCP Screen, Urine 01/27/2025 Presumptive Negative  Presumptive Negative Final    CUTOFF LEVEL:  25 NG/ML  Cross-reactivity has been reported with dextromethorphan.    Methadone Screen, Urine 01/27/2025 Presumptive Negative  Presumptive Negative Final    CUTOFF LEVEL: 150 NG/ML  The metabolite L-alpha-acetylmethadol (LAAM) is not  detected by this method in concentrations that would  be found in the urine of patients on LAAM therapy.    Ferritin 01/27/2025 118  8 - 150 ng/mL Final    LDH 01/27/2025 184  84 - 246 U/L Final    Vitamin D, 25-Hydroxy, Total 01/27/2025 78  30 - 100 ng/mL Final    Retic % 01/27/2025 4.1 (H)  0.5 - 2.0 % Final    Retic Absolute 01/27/2025 0.150 (H)  0.018 - 0.083 x10*6/uL Final    Reticulocyte Hemoglobin 01/27/2025 30  28 - 38 pg Final    Immature Retic fraction 01/27/2025 24.3 (H)  <=16.0 % Final    Reticulocytes are measured based on a fluorescent technique. The IRF, or immature reticulocyte fraction, is the percent of reticulocytes that show medium (MFR) or high (HFR) fluorescence.  This value can be used to assess the relative maturity of the reticulocyte population in response to anemia. The \"shift reticulocytes\" are not measured by this technique, eliminating the need for their correction in the reticulocyte index.             Oleg Pascal MD                          "

## 2025-01-28 NOTE — CPM/PAT H&P
CPM/PAT Evaluation       Name: Sonja Dallas (Sonja Dallas)  /Age: 1960/64 y.o.     Visit Type:   In-Person       Chief Complaint: Visual disturbances requiring intervention    HPI  Patient is a 64-year-old female with a history of HTN, sickle cell with beta thalassemia, hip AVN s/p right hip replacement, non-small cell lung cancer s/p lobectomy, GERD, depression, insomnia and age-related cataracts who presents today for preoperative evaluation.  Patient follows with Dr. Kumari for age-related cataract of the left eye.  She is scheduled for left cataract extraction with intraocular lens implantation under MAC anesthesia on 2/3/2025 with Dr. Kumari.   Patient reports she had a cold with cough, congestion and headache at the beginning of January but the symptoms have since subsided after antibiotic treatment.  Denies further illness, fever, chills, fatigue, cough, shortness of breath, chest pain, lower extremity edema, urinary or GI symptoms.     Past Medical History:   Diagnosis Date    Acute embolism and thrombosis of superficial veins of left upper extremity 2019    Acute thrombosis of left basilic vein    Adjustment disorder with mixed anxiety and depressed mood 2012    Adjustment disorder with mixed anxiety and depressed mood    Anemia     Arthritis     Encounter for immunization 2020    Need for Td vaccine    Encounter for immunization 10/11/2019    Need for influenza vaccination    GERD (gastroesophageal reflux disease)     Hb-SS disease with crisis, unspecified (Multi) 2018    Sickle cell pain crisis    Hernia, internal     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) 2013    Aseptic necrosis    Opioid use, unspecified with withdrawal (Multi) 2013    Opioid withdrawal    Other conditions influencing health status 2012    Benzodiazepine Dependence - Episodic    Other conditions influencing health status 2012    Urinary Tract Infection     Personal history of diseases of the blood and blood-forming organs and certain disorders involving the immune mechanism     History of sickle cell trait    Personal history of diseases of the blood and blood-forming organs and certain disorders involving the immune mechanism 06/18/2012    History of thrombotic thrombocytopenic purpura    Personal history of other diseases of the circulatory system     History of hypertension    Personal history of other diseases of the musculoskeletal system and connective tissue     History of arthritis    Personal history of other diseases of the respiratory system     History of asthma    Personal history of other endocrine, nutritional and metabolic disease     History of obesity    Personal history of other infectious and parasitic diseases 01/21/2014    Personal history of Helicobacter infection    Personal history of other malignant neoplasm of bronchus and lung 04/15/2018    History of malignant neoplasm of lung    Personal history of other medical treatment 10/11/2019    History of screening mammography    Personal history of other mental and behavioral disorders 04/14/2013    History of depression    Personal history of other specified conditions     History of heartburn    Personal history of other specified conditions     History of ulceration    Personal history of other specified conditions 04/04/2013    History of abdominal pain    Pneumonia     Puckering of macula, left eye 06/18/2015    ERM OS (epiretinal membrane, left eye)    Sickle cell trait (CMS-HCC)     Vision loss        Past Surgical History:   Procedure Laterality Date    CHOLECYSTECTOMY      EYE SURGERY      GALLBLADDER SURGERY  09/28/2015    Gallbladder Surgery    HIP ARTHROPLASTY      LUNG LOBECTOMY  08/04/2012    Lung Lobectomy    OTHER SURGICAL HISTORY  06/18/2012    Reported Hx Of Shoulder Joint Replacement    OTHER SURGICAL HISTORY  09/21/2017    Lung Lobectomy Partial    OTHER SURGICAL HISTORY   04/20/2015    Cervical Conization    TOTAL HIP ARTHROPLASTY  06/18/2012    Total Hip Replacement    TOTAL HIP ARTHROPLASTY  12/04/2012    Hip Replacement    TOTAL SHOULDER ARTHROPLASTY  07/24/2018    Shoulder Arthroplasty Total Shoulder Replacement    UPPER GASTROINTESTINAL ENDOSCOPY         Patient  has no history on file for sexual activity.    Family History   Problem Relation Name Age of Onset    Cancer Father         Allergies   Allergen Reactions    Gabapentin Other     Abdominal pain    Gloves, Latex With Aloe Vera Swelling     Latex Gloves MISC    Latex Swelling     gloves    Morphine Unknown     itching    Naproxen Unknown       Prior to Admission medications    Medication Sig Start Date End Date Taking? Authorizing Provider   buPROPion XL (Wellbutrin XL) 300 mg 24 hr tablet Take 1 tablet (300 mg) by mouth once daily. 11/11/24   Bharti Romero MD   butalbital-acetaminophen-caff (Fioricet) -40 mg capsule Take 1 capsule by mouth every 6 hours if needed for headaches (migraine headaches). 9/30/24   Oleg Pascal MD   cholecalciferol (Vitamin D3) 10 MCG (400 UNIT) tablet Take 1 tablet (10 mcg) by mouth once daily.    Historical Provider, MD   ferrous sulfate, 325 mg ferrous sulfate, tablet Take 1 tablet (325 mg) by mouth once daily. 3/17/05   Historical Provider, MD   FLAXSEED OIL ORAL Take with water in when needed 12/22/20   Historical Provider, MD   folic acid (Folvite) 1 mg tablet Take 1 tablet (1 mg) by mouth once daily. 8/21/12   Historical Provider, MD   multivitamin (MULTIPLE VITAMINS ORAL) Take by mouth once daily. 8/21/12   Historical Provider, MD   mv,trey,iron,mn/folic acid/chol (HAIR-SKIN-NAILS, PABA, ORAL) Take by mouth.    Historical Provider, MD   naloxone (Narcan) 4 mg/0.1 mL nasal spray Administer 1 spray (4 mg) into affected nostril(s) if needed for opioid reversal. May repeat every 2-3 minutes if needed, alternating nostrils, until medical assistance becomes available. 12/23/24  "  Oleg Pascal MD   omeprazole (PriLOSEC) 40 mg DR capsule Take 1 capsule (40 mg) by mouth once daily in the morning. 7/1/24   Bharti Romero MD   oxyCODONE (Roxicodone) 10 mg immediate release tablet Take 1 tablet (10 mg) by mouth every 6 hours if needed for severe pain (7 - 10). 1/20/25   Oleg Pascal MD   potassium chloride CR 20 mEq ER tablet Take 2 tablets (40 mEq) by mouth once daily. 6/6/24   Bharti Romero MD   SUMAtriptan (Imitrex) 50 mg tablet Take 1 tablet (50 mg) by mouth 1 time if needed for migraine. May repeat dose once in 2 hours if no relief.  Do not exceed 2 doses in 24 hours. 12/17/24 1/16/25  Holley Auguste MD   triamterene-hydrochlorothiazid (Maxzide) 75-50 mg tablet Take 1 tablet by mouth once daily. 11/11/24   Bharti Romero MD   methylPREDNISolone (Medrol Dospak) 4 mg tablets Follow schedule on package instructions 12/17/24 1/27/25  Holley Auguste MD   VITAMIN E ACETATE ORAL Take 1 tablet by mouth once daily.  1/27/25  Historical Provider, MD        A ten-point review of systems was completed and is otherwise negative except for what is mentioned in the HPI above.    Physical Exam:    GENERAL: Well developed, AA female, awake/alert/oriented x3, no distress, alert and cooperative  HEENT: MMM  NECK: Trachea midline, no lymphadenopathy  CARDIOVASCULAR: RRR, normal S1 and S 2, no murmurs, 2+ equal pulses of the extremities  RESPIRATORY: Patent airways, CTAB, normal breath sounds with good chest expansion, thorax symmetric  ABDOMEN: Soft, non-tender, no distention  SKIN: Warm and dry  EXTREMITIES: No cyanosis, edema  NEURO: A&O x 3, normal motor and sensation, no focal deficits   PSYCH: Appropriate mood and behavior      PAT AIRWAY:   Airway:     Mallampati::  I    TM distance::  >3 FB    Neck ROM::  Full  normal        Visit Vitals  /71   Pulse 86   Temp 36 °C (96.8 °F) (Temporal)   Resp 20   Ht 1.626 m (5' 4\")   Wt 68.2 kg (150 lb 5.7 oz)   LMP  (LMP Unknown)   SpO2 " 100%   BMI 25.81 kg/m²   OB Status Postmenopausal   Smoking Status Former   BSA 1.76 m²       DASI Risk Score      Flowsheet Row Pre-Admission Testing from [facility]/28/[facility] in [facility]   Can you take care of yourself (eat, dress, bathe, or use toilet)?  2.75 filed at 01/28/2025 1305   Can you walk indoors, such as around your house? 1.75 filed at 01/28/2025 1305   Can you walk a block or two on level ground?  2.75 filed at 01/28/2025 1305   Can you climb a flight of stairs or walk up a hill? 5.5 filed at 01/28/2025 1305   Can you run a short distance? 8 filed at 01/28/2025 1305   Can you do light work around the house like dusting or washing dishes? 2.7 filed at 01/28/2025 1305   Can you do moderate work around the house like vacuuming, sweeping floors or carrying groceries? 3.5 filed at 01/28/2025 1305   Can you do heavy work around the house like scrubbing floors or lifting and moving heavy furniture?  0 filed at 01/28/2025 1305   Can you do yard work like raking leaves, weeding or pushing a mower? 0 filed at 01/28/2025 1305   Can you have sexual relations? 5.25 filed at 01/28/2025 1305   Can you participate in moderate recreational activities like golf, bowling, dancing, doubles tennis or throwing a baseball or football? 0 filed at 01/28/2025 1305   Can you participate in strenous sports like swimming, singles tennis, football, basketball, or skiing? 0 filed at 01/28/2025 1305   DASI SCORE 32.2 filed at 01/28/2025 1305   METS Score (Will be calculated only when all the questions are answered) 6.7 filed at 01/28/2025 1305          Caprini DVT Assessment      Flowsheet Row Admission (Discharged) from 7/2/2024 in [facility] 4 with Oleg Pascal MD   DVT Score (IF A SCORE IS NOT CALCULATING, MUST SELECT A BMI TO COMPLETE) 6 filed at 07/02/2024 1453   Labs/Test Results Other Thrombophilia filed at 07/02/2024 3589   RETIRED: Current Status Medical patient at [facility] filed at 07/02/2024 6228    RETIRED: Age 60-75 years filed at 07/02/2024 5129          Modified Frailty Index    No data to display       CHADS2 Stroke Risk  Current as of 3 hours ago        N/A 3 to 100%: High Risk   2 to < 3%: Medium Risk   0 to < 2%: Low Risk     Last Change: N/A          This score determines the patient's risk of having a stroke if the patient has atrial fibrillation.        This score is not applicable to this patient. Components are not calculated.          Revised Cardiac Risk Index    No data to display       Apfel Simplified Score    No data to display       Risk Analysis Index Results This Encounter    No data found in the last 10 encounters.       Stop Bang Score      Flowsheet Row Pre-Admission Testing from 1/28/2025 in [facility]   Do you snore loudly? 1 filed at 01/28/2025 1305   Do you often feel tired or fatigued after your sleep? 0 filed at 01/28/2025 1305   Has anyone ever observed you stop breathing in your sleep? 0 filed at 01/28/2025 1305   Do you have or are you being treated for high blood pressure? 1 filed at 01/28/2025 1305   Recent BMI (Calculated) 25.8 filed at 01/28/2025 1305   Is BMI greater than 35 kg/m2? 0=No filed at 01/28/2025 1305   Age older than 50 years old? 1=Yes filed at 01/28/2025 1305   Is your neck circumference greater than 17 inches (Male) or 16 inches (Female)? 0 filed at 01/28/2025 1305   Gender - Male 0=No filed at 01/28/2025 1305   STOP-BANG Total Score 3 filed at 01/28/2025 1305          Prodigy: High Risk  Total Score: 11              Prodigy Age Score      Prodigy Previous Opioid Use Score           ARISCAT Score for Postoperative Pulmonary Complications    No data to display       Hicks Perioperative Risk for Myocardial Infarction or Cardiac Arrest (ANGEL)    No data to display         Assessment and Plan:   Patient is a 64-year-old female with a history of HTN, sickle cell with beta thalassemia, hip AVN s/p right hip replacement, non-small cell lung cancer s/p  lobectomy, GERD, depression, insomnia and age-related cataracts.    #Age-related cataract of the left eye  She is scheduled for left cataract extraction with intraocular lens implantation under MAC anesthesia on 2/3/2025 with Dr. Kumari    #HTN, Hx  Currently maintained on triamterene-hydrochlorothiazide    #GERD, Hx  Currently maintained on omeprazole    #Sickle cell with beta thalassemia, Hx  Follows with hematologist Dr. Pascal, last seen on 1/27/2025  Pain controled with oxycodone daily - managed by her hematologist  I did reach out to him and let him know she is scheduled for cataract surgery. He states he has no issues with proceeding with surgery from a hematology standpoint.    #Depression, Hx  Currently maintained on bupropion    #Insomnia, Hx  Utilizes marijuana via vape or smoking to help sleep    Labs from 1/27/2025 reviewed remarkable for potassium 3.4, hemoglobin 10.1, hematocrit 29.4.   U tox positive for cannabinoids and oxycodone  Echocardiogram from 9/2024 shows normal LVEF  EKG from 2/2023 demonstrates normal sinus rhythm    I spent 45 minutes in the professional and overall care of this patient. Greater than 50% of this time was spent counseling patient, reviewing plan of care and discussing medication perioperative management.    Sophia Basilio, APRN-CNP

## 2025-01-30 LAB — CARBOXYTHC UR-MCNC: 213 NG/ML

## 2025-01-31 LAB
6MAM UR CFM-MCNC: <25 NG/ML
CODEINE UR CFM-MCNC: <50 NG/ML
HYDROCODONE CTO UR CFM-MCNC: <25 NG/ML
HYDROMORPHONE UR CFM-MCNC: <25 NG/ML
MORPHINE UR CFM-MCNC: <50 NG/ML
NORHYDROCODONE UR CFM-MCNC: <25 NG/ML
NOROXYCODONE UR CFM-MCNC: 736 NG/ML
OXYCODONE UR CFM-MCNC: 71 NG/ML
OXYMORPHONE UR CFM-MCNC: 35 NG/ML

## 2025-02-04 ENCOUNTER — APPOINTMENT (OUTPATIENT)
Dept: OPHTHALMOLOGY | Facility: CLINIC | Age: 65
End: 2025-02-04
Payer: COMMERCIAL

## 2025-02-08 ENCOUNTER — TELEPHONE (OUTPATIENT)
Dept: SURGERY | Facility: HOSPITAL | Age: 65
End: 2025-02-08
Payer: COMMERCIAL

## 2025-02-15 DIAGNOSIS — I10 PRIMARY HYPERTENSION: ICD-10-CM

## 2025-02-17 RX ORDER — TRIAMTERENE AND HYDROCHLOROTHIAZIDE 75; 50 MG/1; MG/1
1 TABLET ORAL DAILY
Qty: 90 TABLET | Refills: 1 | Status: SHIPPED | OUTPATIENT
Start: 2025-02-17

## 2025-02-19 ENCOUNTER — TELEPHONE (OUTPATIENT)
Dept: HEMATOLOGY/ONCOLOGY | Facility: HOSPITAL | Age: 65
End: 2025-02-19
Payer: COMMERCIAL

## 2025-02-19 DIAGNOSIS — D57.00 SICKLE CELL ANEMIA WITH PAIN (MULTI): ICD-10-CM

## 2025-02-19 RX ORDER — OXYCODONE HYDROCHLORIDE 10 MG/1
10 TABLET ORAL EVERY 6 HOURS PRN
Qty: 60 TABLET | Refills: 0 | Status: SHIPPED | OUTPATIENT
Start: 2025-02-19

## 2025-02-19 NOTE — TELEPHONE ENCOUNTER
Refill received for Oxycodone 10mg.  Preferred pharmacy is [facility] at [address] in [place].  Message sent to Sickle Cell team,

## 2025-03-03 ENCOUNTER — ANESTHESIA (OUTPATIENT)
Dept: OPERATING ROOM | Facility: HOSPITAL | Age: 65
End: 2025-03-03
Payer: COMMERCIAL

## 2025-03-03 ENCOUNTER — DOCUMENTATION (OUTPATIENT)
Dept: OPHTHALMOLOGY | Facility: CLINIC | Age: 65
End: 2025-03-03

## 2025-03-03 ENCOUNTER — HOSPITAL ENCOUNTER (OUTPATIENT)
Facility: HOSPITAL | Age: 65
Setting detail: OUTPATIENT SURGERY
Discharge: HOME | End: 2025-03-03
Attending: OPHTHALMOLOGY | Admitting: OPHTHALMOLOGY
Payer: COMMERCIAL

## 2025-03-03 ENCOUNTER — ANESTHESIA EVENT (OUTPATIENT)
Dept: OPERATING ROOM | Facility: HOSPITAL | Age: 65
End: 2025-03-03
Payer: COMMERCIAL

## 2025-03-03 VITALS
RESPIRATION RATE: 16 BRPM | OXYGEN SATURATION: 100 % | BODY MASS INDEX: 25.67 KG/M2 | TEMPERATURE: 97 F | SYSTOLIC BLOOD PRESSURE: 151 MMHG | WEIGHT: 150.35 LBS | HEIGHT: 64 IN | DIASTOLIC BLOOD PRESSURE: 80 MMHG | HEART RATE: 76 BPM

## 2025-03-03 DIAGNOSIS — H25.812 COMBINED FORM OF AGE-RELATED CATARACT, LEFT EYE: Primary | ICD-10-CM

## 2025-03-03 PROCEDURE — 66984 XCAPSL CTRC RMVL W/O ECP: CPT | Performed by: OPHTHALMOLOGY

## 2025-03-03 PROCEDURE — 3600000008 HC OR TIME - EACH INCREMENTAL 1 MINUTE - PROCEDURE LEVEL THREE: Performed by: OPHTHALMOLOGY

## 2025-03-03 PROCEDURE — A66984 PR REMV CATARACT EXTRACAP,INSERT LENS: Performed by: NURSE ANESTHETIST, CERTIFIED REGISTERED

## 2025-03-03 PROCEDURE — 3600000003 HC OR TIME - INITIAL BASE CHARGE - PROCEDURE LEVEL THREE: Performed by: OPHTHALMOLOGY

## 2025-03-03 PROCEDURE — 2500000004 HC RX 250 GENERAL PHARMACY W/ HCPCS (ALT 636 FOR OP/ED): Performed by: NURSE ANESTHETIST, CERTIFIED REGISTERED

## 2025-03-03 PROCEDURE — 3700000001 HC GENERAL ANESTHESIA TIME - INITIAL BASE CHARGE: Performed by: OPHTHALMOLOGY

## 2025-03-03 PROCEDURE — 7100000009 HC PHASE TWO TIME - INITIAL BASE CHARGE: Performed by: OPHTHALMOLOGY

## 2025-03-03 PROCEDURE — A66984 PR REMV CATARACT EXTRACAP,INSERT LENS: Performed by: ANESTHESIOLOGY

## 2025-03-03 PROCEDURE — 3700000002 HC GENERAL ANESTHESIA TIME - EACH INCREMENTAL 1 MINUTE: Performed by: OPHTHALMOLOGY

## 2025-03-03 PROCEDURE — 7100000010 HC PHASE TWO TIME - EACH INCREMENTAL 1 MINUTE: Performed by: OPHTHALMOLOGY

## 2025-03-03 PROCEDURE — 2500000004 HC RX 250 GENERAL PHARMACY W/ HCPCS (ALT 636 FOR OP/ED): Performed by: OPHTHALMOLOGY

## 2025-03-03 PROCEDURE — 2500000005 HC RX 250 GENERAL PHARMACY W/O HCPCS: Performed by: OPHTHALMOLOGY

## 2025-03-03 DEVICE — STERILE UV AND BLUE LIGHT FILTERING ACRYLIC FOLDABLE ASPHERIC POSTERIOR CHAMBER INTRAOCULAR LENS
Type: IMPLANTABLE DEVICE | Site: EYE | Status: FUNCTIONAL
Brand: CLAREON

## 2025-03-03 RX ORDER — TROPICAMIDE 10 MG/ML
1 SOLUTION/ DROPS OPHTHALMIC
Status: COMPLETED | OUTPATIENT
Start: 2025-03-03 | End: 2025-03-03

## 2025-03-03 RX ORDER — POVIDONE-IODINE 5 %
SOLUTION, NON-ORAL OPHTHALMIC (EYE) AS NEEDED
Status: DISCONTINUED | OUTPATIENT
Start: 2025-03-03 | End: 2025-03-03 | Stop reason: HOSPADM

## 2025-03-03 RX ORDER — LIDOCAINE HYDROCHLORIDE 20 MG/ML
INJECTION, SOLUTION INFILTRATION; PERINEURAL AS NEEDED
Status: DISCONTINUED | OUTPATIENT
Start: 2025-03-03 | End: 2025-03-03 | Stop reason: HOSPADM

## 2025-03-03 RX ORDER — TRIAMCINOLONE ACETONIDE 40 MG/ML
INJECTION, SUSPENSION INTRA-ARTICULAR; INTRAMUSCULAR AS NEEDED
Status: DISCONTINUED | OUTPATIENT
Start: 2025-03-03 | End: 2025-03-03 | Stop reason: HOSPADM

## 2025-03-03 RX ORDER — MIDAZOLAM HYDROCHLORIDE 1 MG/ML
INJECTION, SOLUTION INTRAMUSCULAR; INTRAVENOUS AS NEEDED
Status: DISCONTINUED | OUTPATIENT
Start: 2025-03-03 | End: 2025-03-03

## 2025-03-03 RX ORDER — PHENYLEPHRINE HYDROCHLORIDE 25 MG/ML
1 SOLUTION/ DROPS OPHTHALMIC
Status: COMPLETED | OUTPATIENT
Start: 2025-03-03 | End: 2025-03-03

## 2025-03-03 RX ORDER — CYCLOPENTOLATE HYDROCHLORIDE 10 MG/ML
1 SOLUTION/ DROPS OPHTHALMIC
Status: COMPLETED | OUTPATIENT
Start: 2025-03-03 | End: 2025-03-03

## 2025-03-03 RX ORDER — KETOROLAC TROMETHAMINE 5 MG/ML
1 SOLUTION OPHTHALMIC 4 TIMES DAILY
Qty: 5 ML | Refills: 1 | Status: SHIPPED | OUTPATIENT
Start: 2025-03-03

## 2025-03-03 RX ORDER — MOXIFLOXACIN 5 MG/ML
1 SOLUTION/ DROPS OPHTHALMIC
Status: COMPLETED | OUTPATIENT
Start: 2025-03-03 | End: 2025-03-03

## 2025-03-03 RX ORDER — TETRACAINE HYDROCHLORIDE 5 MG/ML
SOLUTION OPHTHALMIC AS NEEDED
Status: DISCONTINUED | OUTPATIENT
Start: 2025-03-03 | End: 2025-03-03 | Stop reason: HOSPADM

## 2025-03-03 RX ORDER — MOXIFLOXACIN 5 MG/ML
SOLUTION/ DROPS OPHTHALMIC AS NEEDED
Status: DISCONTINUED | OUTPATIENT
Start: 2025-03-03 | End: 2025-03-03 | Stop reason: HOSPADM

## 2025-03-03 RX ORDER — PREDNISOLONE ACETATE 10 MG/ML
1 SUSPENSION/ DROPS OPHTHALMIC 4 TIMES DAILY
Qty: 5 ML | Refills: 2 | Status: SHIPPED | OUTPATIENT
Start: 2025-03-03

## 2025-03-03 RX ORDER — TETRACAINE HYDROCHLORIDE 5 MG/ML
1 SOLUTION OPHTHALMIC ONCE
Status: COMPLETED | OUTPATIENT
Start: 2025-03-03 | End: 2025-03-03

## 2025-03-03 RX ADMIN — PHENYLEPHRINE HYDROCHLORIDE 1 DROP: 25 SOLUTION/ DROPS OPHTHALMIC at 08:09

## 2025-03-03 RX ADMIN — MIDAZOLAM 2 MG: 1 INJECTION INTRAMUSCULAR; INTRAVENOUS at 08:37

## 2025-03-03 RX ADMIN — TROPICAMIDE 1 DROP: 10 SOLUTION/ DROPS OPHTHALMIC at 08:09

## 2025-03-03 RX ADMIN — CYCLOPENTOLATE HYDROCHLORIDE 1 DROP: 10 SOLUTION OPHTHALMIC at 08:00

## 2025-03-03 RX ADMIN — TROPICAMIDE 1 DROP: 10 SOLUTION/ DROPS OPHTHALMIC at 08:01

## 2025-03-03 RX ADMIN — MOXIFLOXACIN HYDROCHLORIDE 1 DROP: 5 SOLUTION/ DROPS OPHTHALMIC at 08:08

## 2025-03-03 RX ADMIN — TROPICAMIDE 1 DROP: 10 SOLUTION/ DROPS OPHTHALMIC at 08:19

## 2025-03-03 RX ADMIN — TETRACAINE HYDROCHLORIDE 1 DROP: 5 SOLUTION OPHTHALMIC at 07:58

## 2025-03-03 RX ADMIN — PHENYLEPHRINE HYDROCHLORIDE 1 DROP: 25 SOLUTION/ DROPS OPHTHALMIC at 08:00

## 2025-03-03 RX ADMIN — CYCLOPENTOLATE HYDROCHLORIDE 1 DROP: 10 SOLUTION OPHTHALMIC at 08:18

## 2025-03-03 RX ADMIN — PHENYLEPHRINE HYDROCHLORIDE 1 DROP: 25 SOLUTION/ DROPS OPHTHALMIC at 08:18

## 2025-03-03 RX ADMIN — MOXIFLOXACIN HYDROCHLORIDE 1 DROP: 5 SOLUTION/ DROPS OPHTHALMIC at 07:59

## 2025-03-03 RX ADMIN — CYCLOPENTOLATE HYDROCHLORIDE 1 DROP: 10 SOLUTION OPHTHALMIC at 08:09

## 2025-03-03 RX ADMIN — MOXIFLOXACIN HYDROCHLORIDE 1 DROP: 5 SOLUTION/ DROPS OPHTHALMIC at 08:04

## 2025-03-03 SDOH — HEALTH STABILITY: MENTAL HEALTH: CURRENT SMOKER: 0

## 2025-03-03 ASSESSMENT — PAIN SCALES - GENERAL
PAINLEVEL_OUTOF10: 2
PAIN_LEVEL: 0
PAINLEVEL_OUTOF10: 3

## 2025-03-03 ASSESSMENT — COLUMBIA-SUICIDE SEVERITY RATING SCALE - C-SSRS
1. IN THE PAST MONTH, HAVE YOU WISHED YOU WERE DEAD OR WISHED YOU COULD GO TO SLEEP AND NOT WAKE UP?: NO
6. HAVE YOU EVER DONE ANYTHING, STARTED TO DO ANYTHING, OR PREPARED TO DO ANYTHING TO END YOUR LIFE?: NO
2. HAVE YOU ACTUALLY HAD ANY THOUGHTS OF KILLING YOURSELF?: NO

## 2025-03-03 ASSESSMENT — PAIN - FUNCTIONAL ASSESSMENT: PAIN_FUNCTIONAL_ASSESSMENT: 0-10

## 2025-03-03 NOTE — ANESTHESIA POSTPROCEDURE EVALUATION
Patient: Sonja Dallas    Procedure Summary       Date: 03/03/25 Room / Location: [facility] OR 02 / Virtual [facility] OR    Anesthesia Start: 0832 Anesthesia Stop:     Procedure: LEFT CATARACT EXTRACTION WITH INTRAOCULAR LENS IMPLANTATION  **LATEX ALLERGY** (Left) Diagnosis:       Combined form of age-related cataract, left eye      (Combined form of age-related cataract, left eye [H25.812])    Surgeons: Barbara Kumari MD Responsible Provider: Marlon Andrews MD    Anesthesia Type: MAC ASA Status: 3            Anesthesia Type: MAC    Vitals Value Taken Time   /71 03/03/25 0905   Temp 36.1 °C (97 °F) 03/03/25 0903   Pulse 71 03/03/25 0905   Resp 16 03/03/25 0903   SpO2 100 % 03/03/25 0905   Vitals shown include unfiled device data.    Anesthesia Post Evaluation    Patient location during evaluation: PACU  Patient participation: complete - patient participated  Level of consciousness: awake and alert  Pain score: 0  Pain management: adequate  Airway patency: patent  Cardiovascular status: acceptable, blood pressure returned to baseline and hemodynamically stable  Respiratory status: acceptable  Hydration status: acceptable  Postoperative Nausea and Vomiting: none        There were no known notable events for this encounter.

## 2025-03-03 NOTE — H&P
History Of Present Illness  Sonja Dallas is a 64 y.o. female presenting with combined age-related cataract of the left eye .     Past Medical History  Past Medical History:   Diagnosis Date    Acute embolism and thrombosis of superficial veins of left upper extremity 03/23/2019    Acute thrombosis of left basilic vein    Adjustment disorder with mixed anxiety and depressed mood 06/18/2012    Adjustment disorder with mixed anxiety and depressed mood    Anemia     Arthritis     Encounter for immunization 12/21/2020    Need for Td vaccine    Encounter for immunization 10/11/2019    Need for influenza vaccination    GERD (gastroesophageal reflux disease)     Hb-SS disease with crisis, unspecified (Multi) 02/08/2018    Sickle cell pain crisis    Hernia, internal     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) 05/06/2013    Aseptic necrosis    Opioid use, unspecified with withdrawal (Multi) 05/06/2013    Opioid withdrawal    Other conditions influencing health status 06/18/2012    Benzodiazepine Dependence - Episodic    Other conditions influencing health status 12/04/2012    Urinary Tract Infection    Personal history of diseases of the blood and blood-forming organs and certain disorders involving the immune mechanism     History of sickle cell trait    Personal history of diseases of the blood and blood-forming organs and certain disorders involving the immune mechanism 06/18/2012    History of thrombotic thrombocytopenic purpura    Personal history of other diseases of the circulatory system     History of hypertension    Personal history of other diseases of the musculoskeletal system and connective tissue     History of arthritis    Personal history of other diseases of the respiratory system     History of asthma    Personal history of other endocrine, nutritional and metabolic disease     History of obesity    Personal history of other infectious and parasitic diseases 01/21/2014    Personal  history of Helicobacter infection    Personal history of other malignant neoplasm of bronchus and lung 04/15/2018    History of malignant neoplasm of lung    Personal history of other medical treatment 10/11/2019    History of screening mammography    Personal history of other mental and behavioral disorders 04/14/2013    History of depression    Personal history of other specified conditions     History of heartburn    Personal history of other specified conditions     History of ulceration    Personal history of other specified conditions 04/04/2013    History of abdominal pain    Pneumonia     Puckering of macula, left eye 06/18/2015    ERM OS (epiretinal membrane, left eye)    Sickle cell trait (CMS-HCC)     Vision loss        Surgical History  Past Surgical History:   Procedure Laterality Date    CHOLECYSTECTOMY      EYE SURGERY      GALLBLADDER SURGERY  09/28/2015    Gallbladder Surgery    HIP ARTHROPLASTY      LUNG LOBECTOMY  08/04/2012    Lung Lobectomy    OTHER SURGICAL HISTORY  06/18/2012    Reported Hx Of Shoulder Joint Replacement    OTHER SURGICAL HISTORY  09/21/2017    Lung Lobectomy Partial    OTHER SURGICAL HISTORY  04/20/2015    Cervical Conization    TOTAL HIP ARTHROPLASTY  06/18/2012    Total Hip Replacement    TOTAL HIP ARTHROPLASTY  12/04/2012    Hip Replacement    TOTAL SHOULDER ARTHROPLASTY  07/24/2018    Shoulder Arthroplasty Total Shoulder Replacement    UPPER GASTROINTESTINAL ENDOSCOPY          Social History  She reports that she quit smoking about 5 years ago. Her smoking use included cigarettes. She has been exposed to tobacco smoke. She has never used smokeless tobacco. She reports that she does not currently use alcohol. She reports current drug use. Drug: Marijuana.    Family History  Family History   Problem Relation Name Age of Onset    Cancer Father          Allergies  Gabapentin; Gloves, latex with aloe vera; Latex; Morphine; and Naproxen    Medications  Current Outpatient  "Medications   Medication Instructions    buPROPion XL (WELLBUTRIN XL) 300 mg, oral, Daily    butalbital-acetaminophen-caff (Fioricet) -40 mg capsule 1 capsule, oral, Every 6 hours PRN    cholecalciferol (Vitamin D3) 10 MCG (400 UNIT) tablet 1 tablet, Daily    ferrous sulfate, 325 mg ferrous sulfate, tablet 1 tablet, Daily    FLAXSEED OIL ORAL Take with water in when needed    folic acid (Folvite) 1 mg tablet 1 tablet, Daily    multivitamin (MULTIPLE VITAMINS ORAL) Daily RT    mv,trey,iron,mn/folic acid/chol (HAIR-SKIN-NAILS, PABA, ORAL) Take by mouth.    naloxone (NARCAN) 4 mg, nasal, As needed, May repeat every 2-3 minutes if needed, alternating nostrils, until medical assistance becomes available.    omeprazole (PRILOSEC) 40 mg, oral, Every morning    oxyCODONE (ROXICODONE) 10 mg, oral, Every 6 hours PRN    potassium chloride CR 20 mEq ER tablet 40 mEq, oral, Daily    SUMAtriptan (IMITREX) 50 mg, oral, Once as needed, May repeat dose once in 2 hours if no relief.  Do not exceed 2 doses in 24 hours.    triamterene-hydrochlorothiazid (Maxzide) 75-50 mg tablet 1 tablet, oral, Daily        Review of Systems  Constitutional: Negative.    HENT: Negative.     Eyes: Negative except as noted in HPI.    Respiratory: Negative.     Cardiovascular: Negative.    Gastrointestinal: Negative.        Physical Exam  General: A&Ox3  HEENT: NC/AT  Lungs: CTAB, no signs of respiratory distress  CV: RRR  Abdomen: soft, NT/ND      Last Recorded Vitals  Blood pressure 134/80, pulse 74, temperature 36.2 °C (97.2 °F), temperature source Temporal, resp. rate 20, height 1.626 m (5' 4\"), weight 68.2 kg (150 lb 5.7 oz), SpO2 97%.    Assessment/Plan   Assessment & Plan  Combined form of age-related cataract, left eye      Sonja Dallas is a 64 y.o. female with combined age-related cataract of the left eye presenting for cataract extraction and insertion of intraocular lens of the left eye. Consent obtained 1/21/2025.            Mitchel Rodriguez " MAYO Baltazar MD

## 2025-03-03 NOTE — DISCHARGE INSTRUCTIONS
After Cataract Surgery Instructions    Postoperative appointment:  Karine 3/4/2025 @ 9:00 AM    When you  are discharged from the surgery center, your eye will be covered with a protective shield.      At night, wear the protective shield over the operated eye for one week after surgery. The doctor will advise you when it is safe to go to bed without the protective shield.    Avoid heavy lifting (nothing heavier than a gallon of milk), bending, pushing, straining and sporting activities until the doctor gives you permission. Avoid any activity in which you might bump or injure your eye. You are allowed to read, watch television, sew and play cards or any other activity that involves using your eyes. Do not rub your eye.    You may use Tylenol after surgery for any discomfort you may experience. A slight aching feeling is normal after surgery.    Continue your home medications as prescribed.    NSAID (Ketorolac)    Steroid (Prednisolone/Pred-Forte)    Day of Surgery:         Use eye drops at 6 p.m. and at bedtime    Day after Surgery:       Use eye drops 4 times a day    If you experience a sudden decrease or loss of vision, increased pain not relieved by Tylenol, increased redness or swelling to eyelids, or fever higher than 100 degrees, call [facility] immediately at [phone]. The doctor can be reached 24 hours a day at this number.    Avoid driving until you can properly  distances.  This may take from one day to two weeks. Check with your doctor.    As your eye is healing, you may notice a fluctuation in your vision for up to 4-6 weeks following your surgery. When your eye has completely healed, the doctor will prescribe a new lens for your glasses if necessary.

## 2025-03-03 NOTE — ANESTHESIA PREPROCEDURE EVALUATION
Patient: Sonja Dallas    Procedure Information       Date/Time: 03/03/25 0825    Procedure: LEFT CATARACT EXTRACTION WITH INTRAOCULAR LENS IMPLANTATION  **LATEX ALLERGY** (Left)    Location: [facility] OR 02 / [facility] OR    Surgeons: Barbara Kumari MD            Relevant Problems   Anesthesia   (-) Difficult intubation   (-) PONV (postoperative nausea and vomiting)      Cardiac   (+) Chest pain   (+) Chest pain, unspecified   (+) Essential hypertension, benign   (+) Hypertension   (+) Pleuritic pain   (+) Secondary hypertension      Neuro   (+) Depression   (+) Major depressive disorder with single episode, in partial remission (CMS-HCC)   (+) Recurrent major depression in full remission (CMS-HCC)      GI   (+) Gastroesophageal reflux disease      Endocrine   (+) Nontoxic multinodular goiter      Hematology   (+) Anemia   (+) Deep vein thrombosis (DVT) of upper extremity (Multi)   (+) Sickle cell anemia with pain (Multi)   (+) Sickle cell crisis (Multi)   (+) Sickle thalassemia disease (Multi)   (+) Vasoocclusive sickle cell crisis (Multi)      Musculoskeletal   (+) Primary osteoarthritis, left shoulder   (+) Unspecified osteoarthritis, unspecified site      HEENT   (+) Hearing loss      ID   (+) Upper respiratory tract infection       Clinical information reviewed:                   NPO Detail:  No data recorded     Physical Exam    Airway  Mallampati: II  TM distance: >3 FB  Neck ROM: full     Cardiovascular - normal exam  Rhythm: regular  Rate: normal     Dental    Pulmonary - normal exam     Abdominal            Anesthesia Plan    History of general anesthesia?: yes  History of complications of general anesthesia?: no    ASA 3     MAC     The patient is not a current smoker.  Education provided regarding risk of obstructive sleep apnea.  intravenous induction   Anesthetic plan and risks discussed with patient.    Plan discussed with CRNA, CAA and attending.

## 2025-03-03 NOTE — BRIEF OP NOTE
Date: 3/3/2025  OR Location: [facility] OR    Name: Sonja Dallas, : 1960, Age: 64 y.o., MRN: [id number], Sex: female    Diagnosis  Pre-op Diagnosis      * Combined form of age-related cataract, left eye [H25.812] Post-op Diagnosis     * Combined form of age-related cataract, left eye [H25.812]     Procedures  LEFT CATARACT EXTRACTION WITH INTRAOCULAR LENS IMPLANTATION  **LATEX ALLERGY**  92031 - MD XCAPSL CTRC RMVL INSJ IO LENS PROSTH W/O ECP      Surgeons      * Barbara Kumari - Primary    Resident/Fellow/Other Assistant:  Surgeons and Role:  * No surgeons found with a matching role *    Staff:   Jailyn: Corin  Circulator: Rhonda    Anesthesia Staff: Anesthesiologist: Marlon Andrews MD  CRNA: ADELINA Neff-MAIA    Procedure Summary  Anesthesia: Monitor Anesthesia Care  ASA: III  Estimated Blood Loss: <1 mL  Intra-op Medications:   Administrations occurring from 0825 to 0920 on 25:   Medication Name Total Dose   lidocaine (Xylocaine) 20 mg/mL (2 %) injection 1 mL   moxifloxacin (Vigamox) 0.5 % ophthalmic solution 2 drop   povidone-iodine 5 % ophthalmic solution 1 Application   tetracaine (Altacaine) 0.5 % ophthalmic solution 4 drop   triamcinolone acetonide (Kenalog-40) injection 4 mg   midazolam (Versed) injection 1 mg/mL 2 mg              Anesthesia Record               Intraprocedure I/O Totals       None           Specimen: No specimens collected               Findings: combined age-related cataract of the left eye     Complications:  None; patient tolerated the procedure well.     Disposition: PACU - hemodynamically stable.  Condition: stable  Specimens Collected: No specimens collected  Attending Attestation:     Barbara Kumari  Phone Number: [phone]

## 2025-03-03 NOTE — OP NOTE
LEFT CATARACT EXTRACTION WITH INTRAOCULAR LENS IMPLANTATION  **LATEX ALLERGY** (L) Operative Note     Date: 3/3/2025  OR Location: [facility] OR    Name: Sonja Dallas, : 1960, Age: 64 y.o., MRN: [id number], Sex: female    Diagnosis  Pre-op Diagnosis      * Combined form of age-related cataract, left eye [H25.812] Post-op Diagnosis     * Combined form of age-related cataract, left eye [H25.812]     Procedures  LEFT CATARACT EXTRACTION WITH INTRAOCULAR LENS IMPLANTATION  **LATEX ALLERGY**  34386 - LA XCAPSL CTRC RMVL INSJ IO LENS PROSTH W/O ECP      Surgeons      * Barbara Kumari - Primary    Resident/Fellow/Other Assistant:  Surgeons and Role:  * No surgeons found with a matching role *    Staff:   Jailyn: Corin  Circulator: Rhonda    Anesthesia Staff: Anesthesiologist: Marlon Andrews MD  CRNA: ADELINA Neff-MAIA    Procedure Summary  Anesthesia: Monitor Anesthesia Care  ASA: III  Estimated Blood Loss: 0mL  Intra-op Medications:   Administrations occurring from 0825 to 0920 on 25:   Medication Name Total Dose   lidocaine (Xylocaine) 20 mg/mL (2 %) injection 1 mL   moxifloxacin (Vigamox) 0.5 % ophthalmic solution 2 drop   povidone-iodine 5 % ophthalmic solution 1 Application   tetracaine (Altacaine) 0.5 % ophthalmic solution 4 drop   triamcinolone acetonide (Kenalog-40) injection 4 mg   midazolam (Versed) injection 1 mg/mL 2 mg              Anesthesia Record               Intraprocedure I/O Totals       None           Specimen: No specimens collected              Drains and/or Catheters: * None in log *    Tourniquet Times:         Implants:  Implants       Type Name Action Serial No.      Lens CLARION IOD 24.0 D Implanted [id number]              Findings: Cataract OS    Indications: Sonja Dallas is an 64 y.o. female who is having surgery for Combined form of age-related cataract, left eye [H25.812].     The patient was seen in the preoperative area. The risks, benefits, complications,  treatment options, non-operative alternatives, expected recovery and outcomes were discussed with the patient. The possibilities of reaction to medication, pulmonary aspiration, injury to surrounding structures, bleeding, recurrent infection, the need for additional procedures, failure to diagnose a condition, and creating a complication requiring transfusion or operation were discussed with the patient. The patient concurred with the proposed plan, giving informed consent.  The site of surgery was properly noted/marked if necessary per policy. The patient has been actively warmed in preoperative area. Preoperative antibiotics have been ordered and given within 1 hours of incision. Venous thrombosis prophylaxis are not indicated.    Procedure Details: The patient was placed in the supine position on the operating room table where appropriate blood pressure and cardiac monitoring were initiated. The patient was prepped and draped in the usual sterile fashion for intraocular surgery. This included instillation of Betadine 5% onto the ocular surface followed by irrigation with balance salt solution a minute or two later. A lid speculum was placed and the operating microscope was positioned. One paracentesis stab incision was made to the left of the planned cataract incision with a 15-degree supersharp blade. 1 ml of preservative free lidocaine was injected into the AC. Viscoat was used to replace the aqueous humor. A temporal clear corneal wound was fashioned beginning at the limbus with a 2.2 mm keratome, extending 2 mm into clear cornea before entering the anterior chamber. A continuous tear circular capsulorhexis of approximately 5 mm in diameter was performed. Hydrodissection was performed using a Lopez canula. The endothelium was coated with viscoat again. Using the Ozil handpiece on the Screen Fix Gibson Lens Removal System, the nucleus was emulsified and aspirated using a divide-and-conquer technique. Residual cortex  was removed from the eye with the irrigation/aspiration bimanually. ProVisc was used to inflate the capsular bag. The lens implant was inspected and found to be free of visible defects. The lens used was an Tato model [id number], power 24.0 diopter intraocular lens. The lens was inserted into the capsular bag using the [place] insertion mechanism. The lens was centered with a Epps hook. Residual viscoelastic was removed from the eye with the irrigation/aspiration instrument. Preservative free moxifloxacin was injected  intracameral. The wounds were checked and found to be watertight. 0.3ml of Diluted (1:3) triamcinolone acetonide was injected subonj inferiorly. The lid speculum was removed and the eye was dressed with Maxitrol ointment, eye pad, tape, and shield. The patient tolerated the procedure well, and there were no complications.   Complications:  None; patient tolerated the procedure well.    Disposition: PACU - hemodynamically stable.  Condition: stable                 Additional Details: None    Attending Attestation: I performed the procedure.    Barbara Kumari  Phone Number: [phone]

## 2025-03-04 ENCOUNTER — OFFICE VISIT (OUTPATIENT)
Dept: OPHTHALMOLOGY | Facility: CLINIC | Age: 65
End: 2025-03-04
Payer: COMMERCIAL

## 2025-03-04 DIAGNOSIS — H25.812 COMBINED FORMS OF AGE-RELATED CATARACT OF LEFT EYE: Primary | ICD-10-CM

## 2025-03-04 PROCEDURE — 99024 POSTOP FOLLOW-UP VISIT: CPT | Performed by: OPHTHALMOLOGY

## 2025-03-04 ASSESSMENT — ENCOUNTER SYMPTOMS
MUSCULOSKELETAL NEGATIVE: 0
EYES NEGATIVE: 1
CONSTITUTIONAL NEGATIVE: 0
ENDOCRINE NEGATIVE: 0
ALLERGIC/IMMUNOLOGIC NEGATIVE: 0
HEMATOLOGIC/LYMPHATIC NEGATIVE: 0
PSYCHIATRIC NEGATIVE: 0
NEUROLOGICAL NEGATIVE: 0
GASTROINTESTINAL NEGATIVE: 0
CARDIOVASCULAR NEGATIVE: 0
RESPIRATORY NEGATIVE: 0

## 2025-03-04 ASSESSMENT — CONF VISUAL FIELD
OS_SUPERIOR_TEMPORAL_RESTRICTION: 0
OD_SUPERIOR_NASAL_RESTRICTION: 0
OD_NORMAL: 1
OS_SUPERIOR_NASAL_RESTRICTION: 0
OS_NORMAL: 1
METHOD: COUNTING FINGERS
OD_SUPERIOR_TEMPORAL_RESTRICTION: 0
OS_INFERIOR_NASAL_RESTRICTION: 0
OD_INFERIOR_NASAL_RESTRICTION: 0
OS_INFERIOR_TEMPORAL_RESTRICTION: 0
OD_INFERIOR_TEMPORAL_RESTRICTION: 0

## 2025-03-04 ASSESSMENT — TONOMETRY
IOP_METHOD: TONOPEN
OS_IOP_MMHG: 16

## 2025-03-04 ASSESSMENT — EXTERNAL EXAM - LEFT EYE: OS_EXAM: NORMAL

## 2025-03-04 ASSESSMENT — VISUAL ACUITY
OS_SC+: +1
METHOD: SNELLEN - LINEAR
OS_SC: 20/80

## 2025-03-04 ASSESSMENT — EXTERNAL EXAM - RIGHT EYE: OD_EXAM: NORMAL

## 2025-03-04 NOTE — PATIENT INSTRUCTIONS
Postop instructions   Prednisolone acetate drops:  4 times/day for 1 week  3 times/day for 1 week  2 times/day for 1 week  Once/day for 1 week    Ketorolac drops:  4 times/day for 4 weeks    Sleep with shield at night for 7 days  No eye rubbing  May shower and wash her face but no water inside surgery eye  No lifting any weight above 10lbs

## 2025-03-17 ENCOUNTER — APPOINTMENT (OUTPATIENT)
Dept: NEUROLOGY | Facility: CLINIC | Age: 65
End: 2025-03-17
Payer: COMMERCIAL

## 2025-03-17 NOTE — PROGRESS NOTES
Neurological [facility]   Sonja Dallas is a 64 y.o. year old female presenting for neurologic evaluation.   Referred by: No ref. provider found  PCP: Bharti Romero MD    12/17/2024: referral for migraine     Pt reports she was in her 40s when this headache first started. At that time, it spontaneously went away.     Then it has started again, she has been having daily morning headaches for the past 6 months. This is described as sharp pain behind the R eye inside the head. Associated with photophobia and nausea, seems to last all day. She is taking daily tylenol and oxycontin for her myalgias / arthritic pains. She has been started on Fioricet which pt reports helps with the headache but HA recurs when medication wears off.     Pt reports she has tried amitriptyline prior for different indication but had to be stopped due to side effects of drowsiness. She also was on verapamil prior which was stopped due to low blood pressure. Not on any preventative medication currently. Not on any other abortives other than Fioricet currently.     3/17/2025: follow up for headaches        Current Acute Headache Treatment None   Current Preventative Headache Treatment None   Previous Acute Headache Treatment  None   Previous Preventative Headache Treatment Amitriptyline , verapamil (low blood pressure),              Review of imaging, echocardiogram, labs and other data:   MRI:  Vessel imaging:  Echo:            Ejection Fraction:          LA size:          Bubble study:  LDL: No data recorded  A1c:     Relevant ROS, Problem list, Past Medical/ Surgical/ Family/ Social history- reviewed and pertinent details noted in history.     Objective     Visit Vitals  LMP  (LMP Unknown)   OB Status Postmenopausal   Smoking Status Former       MENTAL STATUS:  General appearance: in NAD  Orientation: [place] to self, time, place and condition   Language: Expression, repetition, naming, comprehension intact.    Concentration: Intact  Fund of knowledge: Appropriate    CRANIAL NERVES:  - Fundoscopic exam: Deferred   - II/III: PERRL  - II:  Visual fields intact to confrontation bilaterally   - III, IV, VI: EOMI to pursuit without nystagmus  - V: V1-V3 sensation intact bilaterally  - VII: Face muscles symmetric with smile and eye closure  - VIII: Intact to finger rub  - IX, X: Palate elevated symmetrically bilaterally, no hoarseness  - XI: 5/5 strength on shoulder shrugging bilaterally  - XII: Tongue midline without atrophy or fasciculation    MOTOR: Tone and bulk normal in all extremities  STRENGTH: 5/5 strength tested proximally and distally in BUE and BLE     REFLEXES: R L  Biceps   +2 +2  Brachioradialis +2 +2  Patellar   +2 +2  No clonus  COORDINATION: Intact on finger to nose bl, intact on heel to shin bl, MIN intact bl  SENSORY: Intact to light touch in BUE and BLE  GAIT: normal gait       No CT head results found for the past 12 months  No MRI head results found for the past 12 months  No results found. However, due to the size of the patient record, not all encounters were searched. Please check Results Review for a complete set of results.  No echocardiogram results found for the past 12 months     Lab Results   Component Value Date    CHOL 169 11/25/2024    TRIG 168 (H) 11/25/2024    HDL 44.4 11/25/2024    CHHDL 3.8 11/25/2024    LDLF 97 07/13/2023    VLDL 34 11/25/2024    NHDL 125 11/25/2024     Lab Results   Component Value Date    BNP 9 01/27/2025    HGBA1C  11/25/2024      Comment:      Homozygous or compound heterozygous hemoglobin variants detected (such as Hemoglobin SS, SC, etc). Hemoglobin A1c cannot be accurately determined by any method. Recommend fructosamine or glycated albumin measurement for monitoring glycemic control. Hemoglobin identification can be ordered to characterize the variant if clinically indicated.     Lab Results   Component Value Date    GLUCOSE 91 01/27/2025     01/27/2025     K 3.4 (L) 01/27/2025     01/27/2025    CO2 28 01/27/2025    ANIONGAP 12 01/27/2025    BUN 9 01/27/2025    CREATININE 0.96 01/27/2025    CALCIUM 9.8 01/27/2025    PHOS 3.8 02/10/2020    ALBUMIN 4.5 01/27/2025     Lab Results   Component Value Date    CALCIUM 9.8 01/27/2025    PHOS 3.8 02/10/2020    PROT 7.1 01/27/2025    ALBUMIN 4.5 01/27/2025    AST 25 01/27/2025    ALT 20 01/27/2025    ALKPHOS 68 01/27/2025    BILITOT 1.4 (H) 01/27/2025     Lab Results   Component Value Date    WBC 5.0 01/27/2025    HGB 10.1 (L) 01/27/2025    HCT 29.4 (L) 01/27/2025    MCV 81 01/27/2025     01/27/2025     INR   Date Value Ref Range Status   02/28/2019 1.1 0.9 - 1.1 Final     Lab Results   Component Value Date    TSH 0.24 (L) 11/25/2024       Assessment/Plan   No diagnosis found.      PLAN   Daily headaches, does fit criteria of migraines, but there is likely component of medication overuse headache. Discussed trial of medrol taper to see if it would help with breaking the daily headache cycle. As an alternative abortive measures sumatriptan 50mg is recommended as well. Discussed medication overuse headache and need to limit meds if able. Daily preventative medication is also likely needed -- discussed pt may need to try one more preventative medication prior to trial of CGRP inhibitors. Botox injections can also be an option but may take couple months to fully see the effect. Pt would consider these options.         CODING and DOCUMENTATION DETERMINATION  For the Evaluation and Management of this patient, the level of Medical Decision Making for this visit was determined based on the following:    The level of COMPLEXITY AND NUMBER OF PROBLEMS ADDRESSED was:  MODERATE: one chronic illnesses with exacerbation, progression or side effect of Rx.    The AMOUNT/COMPLEXITY OF DATA TO REVIEW (reviewed, ordered or call for) was:  MODERATE (need one of the three): my independent interpretation a test performed by another  provider.    The level of RISK OF COMPLICATIONS was:  MODERATE: Prescription drug management.    Thus, the level of medical decision making (based on the lower of the two highest elements) was determined to be [MODERATE]. Therefore the appropriate E/M code for this encounter is [34926/61350].

## 2025-03-18 ENCOUNTER — TELEPHONE (OUTPATIENT)
Dept: ADMISSION | Facility: HOSPITAL | Age: 65
End: 2025-03-18

## 2025-03-18 ENCOUNTER — APPOINTMENT (OUTPATIENT)
Dept: OPHTHALMOLOGY | Facility: CLINIC | Age: 65
End: 2025-03-18
Payer: COMMERCIAL

## 2025-03-18 DIAGNOSIS — H25.812 COMBINED FORMS OF AGE-RELATED CATARACT OF LEFT EYE: Primary | ICD-10-CM

## 2025-03-18 DIAGNOSIS — D57.00 SICKLE CELL ANEMIA WITH PAIN (MULTI): ICD-10-CM

## 2025-03-18 PROCEDURE — 99024 POSTOP FOLLOW-UP VISIT: CPT | Performed by: OPHTHALMOLOGY

## 2025-03-18 RX ORDER — OXYCODONE HYDROCHLORIDE 10 MG/1
10 TABLET ORAL EVERY 6 HOURS PRN
Qty: 60 TABLET | Refills: 0 | Status: SHIPPED | OUTPATIENT
Start: 2025-03-18

## 2025-03-18 ASSESSMENT — TONOMETRY
IOP_METHOD: TONOPEN
OS_IOP_MMHG: 15

## 2025-03-18 ASSESSMENT — VISUAL ACUITY
OS_SC: 20/70
OS_PH_SC: 20/50
OS_SC+: -2
OS_PH_SC+: -2
METHOD: SNELLEN - LINEAR

## 2025-03-18 ASSESSMENT — EXTERNAL EXAM - LEFT EYE: OS_EXAM: NORMAL

## 2025-03-18 ASSESSMENT — EXTERNAL EXAM - RIGHT EYE: OD_EXAM: NORMAL

## 2025-03-18 NOTE — TELEPHONE ENCOUNTER
Sonja Dallas called the refill line for Oxycodone 10mg. Requesting refills be sent to [facility] pharmacy; message sent to Sickle Cell team to submit.

## 2025-03-18 NOTE — PROGRESS NOTES
POW2 s/p phaco OS  PF and ketorolac taper  Has PCO. If symptomatic, will do YAG in 2-3 months  2-3 weeks for mrx and mac OCT

## 2025-04-08 ENCOUNTER — APPOINTMENT (OUTPATIENT)
Dept: OPHTHALMOLOGY | Facility: CLINIC | Age: 65
End: 2025-04-08
Payer: COMMERCIAL

## 2025-04-08 DIAGNOSIS — H36.812: Primary | ICD-10-CM

## 2025-04-08 DIAGNOSIS — D57.01: Primary | ICD-10-CM

## 2025-04-08 PROCEDURE — 99024 POSTOP FOLLOW-UP VISIT: CPT | Performed by: OPHTHALMOLOGY

## 2025-04-08 PROCEDURE — 92134 CPTRZ OPH DX IMG PST SGM RTA: CPT | Performed by: OPHTHALMOLOGY

## 2025-04-08 ASSESSMENT — REFRACTION_MANIFEST
OD_CYLINDER: -1.00
OS_ADD: +2.25
OS_SPHERE: -0.75
OS_CYLINDER: SPHERE
OS_AXIS: 170
OS_SPHERE: -1.00
OD_AXIS: 100
METHOD_AUTOREFRACTION: 1
OD_CYLINDER: -1.25
OS_CYLINDER: -0.25
OD_AXIS: 090
OD_SPHERE: +3.50
OD_SPHERE: +3.25
OD_ADD: +2.25

## 2025-04-08 ASSESSMENT — CONF VISUAL FIELD
OS_INFERIOR_NASAL_RESTRICTION: 0
OD_SUPERIOR_TEMPORAL_RESTRICTION: 0
OS_INFERIOR_TEMPORAL_RESTRICTION: 0
OD_SUPERIOR_NASAL_RESTRICTION: 0
OD_NORMAL: 1
OS_SUPERIOR_TEMPORAL_RESTRICTION: 0
OD_INFERIOR_TEMPORAL_RESTRICTION: 0
OS_SUPERIOR_NASAL_RESTRICTION: 0
OD_INFERIOR_NASAL_RESTRICTION: 0
OS_NORMAL: 1

## 2025-04-08 ASSESSMENT — EXTERNAL EXAM - RIGHT EYE: OD_EXAM: NORMAL

## 2025-04-08 ASSESSMENT — VISUAL ACUITY
OD_CC: 20/30
METHOD: SNELLEN - LINEAR
OS_SC+: +1
OD_CC+: +3
OS_SC: 20/70

## 2025-04-08 ASSESSMENT — TONOMETRY
OS_IOP_MMHG: 11
IOP_METHOD: TONOPEN
OD_IOP_MMHG: 13

## 2025-04-08 ASSESSMENT — EXTERNAL EXAM - LEFT EYE: OS_EXAM: NORMAL

## 2025-04-08 NOTE — PROGRESS NOTES
POM 1 s/p phaco OS  MRX released to the pt per their request  Vision limited by retina status  Fu with Dr. Villasenor 1 month  With me prn for CE OD or YAG OS

## 2025-04-17 ENCOUNTER — TELEPHONE (OUTPATIENT)
Dept: ADMISSION | Facility: HOSPITAL | Age: 65
End: 2025-04-17
Payer: COMMERCIAL

## 2025-04-17 DIAGNOSIS — D57.00 SICKLE CELL ANEMIA WITH PAIN: ICD-10-CM

## 2025-04-17 RX ORDER — OXYCODONE HYDROCHLORIDE 10 MG/1
10 TABLET ORAL EVERY 6 HOURS PRN
Qty: 60 TABLET | Refills: 0 | Status: SHIPPED | OUTPATIENT
Start: 2025-04-17

## 2025-04-17 NOTE — TELEPHONE ENCOUNTER
"Occupational Therapy  Treatment    Tita Cortes   MRN: [id number]   Pt is requesting a refill of oxycodone 10mg q6 prn, #60.  Last prescription was written 3/18/25.  Preferred pharmacy is [facility] in [employer] in [facility].       Tana Wahl OT  10/23/2020

## 2025-04-29 ENCOUNTER — APPOINTMENT (OUTPATIENT)
Dept: OPHTHALMOLOGY | Facility: CLINIC | Age: 65
End: 2025-04-29
Payer: COMMERCIAL

## 2025-04-29 DIAGNOSIS — D57.01: Primary | ICD-10-CM

## 2025-04-29 DIAGNOSIS — H43.12 VITREOUS HEMORRHAGE OF LEFT EYE (MULTI): ICD-10-CM

## 2025-04-29 DIAGNOSIS — H36.812: Primary | ICD-10-CM

## 2025-04-29 ASSESSMENT — CUP TO DISC RATIO
OS_RATIO: .1
OD_RATIO: .1

## 2025-04-29 ASSESSMENT — VISUAL ACUITY
CORRECTION_TYPE: GLASSES
METHOD: SNELLEN - LINEAR
OD_CC: 2025+1

## 2025-04-29 ASSESSMENT — TONOMETRY
IOP_METHOD: GOLDMANN APPLANATION
OS_IOP_MMHG: 14
OD_IOP_MMHG: 14

## 2025-04-29 ASSESSMENT — EXTERNAL EXAM - RIGHT EYE: OD_EXAM: NORMAL

## 2025-04-29 ASSESSMENT — ENCOUNTER SYMPTOMS
HEMATOLOGIC/LYMPHATIC NEGATIVE: 1
HEMATOLOGIC/LYMPHATIC COMMENTS: SICKLE CELL

## 2025-04-29 ASSESSMENT — EXTERNAL EXAM - LEFT EYE: OS_EXAM: NORMAL

## 2025-04-29 NOTE — PROGRESS NOTES
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     5 H52.4 Presbyopia-Stable     6 H43.12 Vitreous hemorrhage of left eye-Improving     7 H02.88A Meibomian gland dysfunction right eye, upper and lower eyelids-Stable     8 D57.1 Sickle cell retinopathy-Stable     9 H02.88B Meibomian gland dysfunction left eye, upper and lower eyelids-Stable     10 D57.1 Sickle cell anemia-Stable     11 H35.059 Neovascularization of retina-Improving     12 H52.03 Hypermetropia of both eyes-Stable     13 H52.4 Presbyopia-Stable        Discussion      1 Retinal detachment, tractional, left eye~H33.42 stable   2 Vitreous hemorrhage of left eye~H43.12   3 Sickle cell retinopathy~D57.1   4 Sickle cell anemia~D57.1   5 Neovascularization of retina~H35.059   6. Extensive ERM OS       Extensive proliferative retinopathy OU   Vitreous hemorrhage OS chronic reccommend anti VEGF    s/p PPV MP AIR       doing well 6m          Hi quality OCT  scans obtained   signal good      OCT OD - Normal Foveal Contour, No Edema, IS/OS Junction Normal   OCT OS - Normal Foveal Contour, No Edema, IS/OS Junction Normal      additional commnents:                Plan       Follow up 6 months    She may need DVT prophylaxis compression  Instructed to  her new glasses

## 2025-05-07 DIAGNOSIS — K21.9 GASTROESOPHAGEAL REFLUX DISEASE, UNSPECIFIED WHETHER ESOPHAGITIS PRESENT: ICD-10-CM

## 2025-05-08 RX ORDER — OMEPRAZOLE 40 MG/1
40 CAPSULE, DELAYED RELEASE ORAL EVERY MORNING
Qty: 90 CAPSULE | Refills: 1 | Status: SHIPPED | OUTPATIENT
Start: 2025-05-08

## 2025-05-15 ENCOUNTER — TELEPHONE (OUTPATIENT)
Dept: ADMISSION | Facility: HOSPITAL | Age: 65
End: 2025-05-15
Payer: COMMERCIAL

## 2025-05-15 DIAGNOSIS — D57.00 SICKLE CELL ANEMIA WITH PAIN: ICD-10-CM

## 2025-05-15 RX ORDER — OXYCODONE HYDROCHLORIDE 10 MG/1
10 TABLET ORAL EVERY 6 HOURS PRN
Qty: 60 TABLET | Refills: 0 | Status: SHIPPED | OUTPATIENT
Start: 2025-05-15

## 2025-05-16 ENCOUNTER — APPOINTMENT (OUTPATIENT)
Dept: PRIMARY CARE | Facility: CLINIC | Age: 65
End: 2025-05-16
Payer: COMMERCIAL

## 2025-05-16 VITALS
WEIGHT: 149 LBS | BODY MASS INDEX: 25.58 KG/M2 | OXYGEN SATURATION: 96 % | SYSTOLIC BLOOD PRESSURE: 120 MMHG | DIASTOLIC BLOOD PRESSURE: 65 MMHG | HEART RATE: 91 BPM

## 2025-05-16 DIAGNOSIS — E87.6 HYPOKALEMIA: ICD-10-CM

## 2025-05-16 DIAGNOSIS — R42 VERTIGO: Primary | ICD-10-CM

## 2025-05-16 PROCEDURE — G8433 SCR FOR DEP NOT CPT DOC RSN: HCPCS | Performed by: STUDENT IN AN ORGANIZED HEALTH CARE EDUCATION/TRAINING PROGRAM

## 2025-05-16 PROCEDURE — 99214 OFFICE O/P EST MOD 30 MIN: CPT | Performed by: STUDENT IN AN ORGANIZED HEALTH CARE EDUCATION/TRAINING PROGRAM

## 2025-05-16 PROCEDURE — 3078F DIAST BP <80 MM HG: CPT | Performed by: STUDENT IN AN ORGANIZED HEALTH CARE EDUCATION/TRAINING PROGRAM

## 2025-05-16 PROCEDURE — 3074F SYST BP LT 130 MM HG: CPT | Performed by: STUDENT IN AN ORGANIZED HEALTH CARE EDUCATION/TRAINING PROGRAM

## 2025-05-16 PROCEDURE — 1036F TOBACCO NON-USER: CPT | Performed by: STUDENT IN AN ORGANIZED HEALTH CARE EDUCATION/TRAINING PROGRAM

## 2025-05-16 RX ORDER — POTASSIUM CHLORIDE 20 MEQ/1
40 TABLET, EXTENDED RELEASE ORAL DAILY
Qty: 180 TABLET | Refills: 3 | Status: SHIPPED | OUTPATIENT
Start: 2025-05-16

## 2025-05-16 ASSESSMENT — PATIENT HEALTH QUESTIONNAIRE - PHQ9
SUM OF ALL RESPONSES TO PHQ9 QUESTIONS 1 AND 2: 0
1. LITTLE INTEREST OR PLEASURE IN DOING THINGS: NOT AT ALL
2. FEELING DOWN, DEPRESSED OR HOPELESS: NOT AT ALL

## 2025-05-16 NOTE — LETTER
May 16, 2025     Patient: Sonja Dallas   YOB: 1960   Date of Visit: 5/16/2025       To Whom It May Concern:    Sonja Dallas was seen in my clinic on 5/16/2025 . Please excuse Sonja for her absence from work on this day to make the appointment . Please excuse her absence on 5/12,15,16and 19th to allow to recover from her current illness. Tentative return to work at this time is 5/22/25    If you have any questions or concerns, please don't hesitate to call.         Sincerely,         Bharti Romero MD        CC: No Recipients

## 2025-05-16 NOTE — PROGRESS NOTES
Subjective   Patient ID: Sonja Dallas is a 64 y.o. female who presents for Follow-up (Patient here for follow-up visit ).        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         Visit Vitals  /65   Pulse 91   Wt 67.6 kg (149 lb)   LMP  (LMP Unknown)   SpO2 96%   BMI 25.58 kg/m²   OB Status Postmenopausal   Smoking Status Former   BSA 1.75 m²      No LMP recorded (lmp unknown). Patient is postmenopausal.   Current Outpatient Medications   Medication Instructions    buPROPion XL (WELLBUTRIN XL) 300 mg, oral, Daily    butalbital-acetaminophen-caff (Fioricet) -40 mg capsule 1 capsule, oral, Every 6 hours PRN    cholecalciferol (Vitamin D3) 10 MCG (400 UNIT) tablet 1 tablet, Daily    ferrous sulfate, 325 mg ferrous sulfate, tablet 1 tablet, Daily    FLAXSEED OIL ORAL Take with water in when needed    folic acid (Folvite) 1 mg tablet 1 tablet, Daily    ketorolac (Acular) 0.5 % ophthalmic solution 1 drop, Left Eye, 4 times daily    multivitamin (MULTIPLE VITAMINS ORAL) Daily RT    mv,trey,iron,mn/folic acid/chol (HAIR-SKIN-NAILS, PABA, ORAL) Take by mouth.    naloxone (NARCAN) 4 mg, nasal, As needed, May repeat every 2-3 minutes if needed, alternating nostrils, until medical assistance becomes available.    omeprazole (PRILOSEC) 40 mg, oral, Every morning    oxyCODONE (ROXICODONE) 10 mg, oral, Every 6 hours PRN    potassium chloride CR 20 mEq ER tablet 40  mEq, oral, Daily    prednisoLONE acetate (Pred-Forte) 1 % ophthalmic suspension 1 drop, Left Eye, 4 times daily    SUMAtriptan (IMITREX) 50 mg, oral, Once as needed, May repeat dose once in 2 hours if no relief.  Do not exceed 2 doses in 24 hours.    triamterene-hydrochlorothiazid (Maxzide) 75-50 mg tablet 1 tablet, oral, Daily      Social History     Tobacco Use    Smoking status: Former     Current packs/day: 0.00     Types: Cigarettes     Quit date:      Years since quittin.3     Passive exposure: Past    Smokeless tobacco: Never   Substance Use Topics    Alcohol use: Not Currently        Review of Systems    Constitutional : No feeling poorly / fevers/ chills / night sweats/ fatigue   Cardiovascular : No CP /Palpitations/ lower extremity edema / syncope     CNS: As noted in HPI   Psychiatric: No anxiety/ depression/ SI/HI    All other systems have been reviewed and are negative for complaint       Physical Exam    Constitutional : Vitals reviewed. Alert and in no distress  Eyes : no nystagmus noted   eNT : normal EACs and TMs  Cardiovascular : RRR, Normal S1, S2, no peripheral edema   Pulmonary: No respiratory distress, CTAB   MSK : Normal gait and station , strength and tone   Skin: Warm to touch ,  normal skin turgor   Neurologic : CNs 2-12 grossly intact , no obvious FNDs  Cautious gait in the exam room   Psych : A,Ox3, normal mood and affect      Assessment/Plan   Diagnoses and all orders for this visit:  Vertigo  -     Referral to ENT; Future  Hypokalemia  -     potassium chloride CR 20 mEq ER tablet; Take 2 tablets (40 mEq) by mouth once daily.    Perceived unsteadiness in gait could be from vertigo , likely peripheral given the preceding ear pain that has now resolved.  She reported no dizziness .   Subjective report of improvement is reassuring .  ENT referral placed   Work excuse given with RTW on .      RTO in 6m for CPE        Conditions addressed and mgmt as noted above.  Pertinent labs,  images/ imaging reports , chart review was done .   Age appropriate labs / labs for mgmt of chronic medical conditions ordered, further mgmt pending the results.         RTO in  m or sooner if needed . Labs to be done few days prior to the next visit.        This note is intended for the physician writing it, as well as to communicate findings to other healthcare professionals. These notes use medical lexicon that may be misunderstood by non medical persons. Therefore, interpretations of medical notes and terminology should be approached with caution.

## 2025-05-19 RX ORDER — POTASSIUM CHLORIDE 20 MEQ/1
TABLET, EXTENDED RELEASE ORAL
Qty: 180 TABLET | Refills: 3 | OUTPATIENT
Start: 2025-05-19

## 2025-05-22 ENCOUNTER — APPOINTMENT (OUTPATIENT)
Dept: SURGERY | Facility: CLINIC | Age: 65
End: 2025-05-22
Payer: COMMERCIAL

## 2025-05-22 ENCOUNTER — TELEPHONE (OUTPATIENT)
Dept: PRIMARY CARE | Facility: CLINIC | Age: 65
End: 2025-05-22

## 2025-05-22 ENCOUNTER — HOSPITAL ENCOUNTER (OUTPATIENT)
Facility: HOSPITAL | Age: 65
Setting detail: OUTPATIENT SURGERY
End: 2025-05-22
Attending: SURGERY | Admitting: SURGERY
Payer: COMMERCIAL

## 2025-05-22 VITALS
DIASTOLIC BLOOD PRESSURE: 84 MMHG | HEART RATE: 92 BPM | BODY MASS INDEX: 25.75 KG/M2 | WEIGHT: 150 LBS | SYSTOLIC BLOOD PRESSURE: 133 MMHG

## 2025-05-22 DIAGNOSIS — K21.00 GASTROESOPHAGEAL REFLUX DISEASE WITH ESOPHAGITIS WITHOUT HEMORRHAGE: Primary | ICD-10-CM

## 2025-05-22 DIAGNOSIS — R42 VERTIGO: Primary | ICD-10-CM

## 2025-05-22 PROCEDURE — 3075F SYST BP GE 130 - 139MM HG: CPT | Performed by: SURGERY

## 2025-05-22 PROCEDURE — 99213 OFFICE O/P EST LOW 20 MIN: CPT | Performed by: SURGERY

## 2025-05-22 PROCEDURE — 1036F TOBACCO NON-USER: CPT | Performed by: SURGERY

## 2025-05-22 PROCEDURE — 3079F DIAST BP 80-89 MM HG: CPT | Performed by: SURGERY

## 2025-05-22 RX ORDER — MECLIZINE HYDROCHLORIDE 25 MG/1
25 TABLET ORAL 3 TIMES DAILY PRN
Qty: 30 TABLET | Refills: 0 | Status: SHIPPED | OUTPATIENT
Start: 2025-05-22 | End: 2025-06-01

## 2025-05-22 NOTE — PROGRESS NOTES
History Of Present Illness  Sonja Dallas is a 64 y.o. female presenting this patient with continued reflux.  I had seen her a year ago please see that note for all the information.  The barium swallow showed evidence of the hiatal hernia.  The manometry shows she has adequate esophageal motility.  She would like to consider surgery now.  She has had some recent vertigo but otherwise her medical history has been stable.  She had several recent eye surgeries without difficulty.  She had an echo of her heart that was relatively normal.  She sees Dr. Julian routinely.  Recent laboratory results for previous surgeries history of been stable.        Last Recorded Vitals  Blood pressure 133/84, pulse 92, weight 68 kg (150 lb).  Physical Examination  Awake and alert.  Normal respiration.  Abdomen is benign.      Relevant Results  Reviewed her results with her.  This included the barium swallow and the manometry.    Assessment/Plan again reviewed the hiatal hernia surgery book.  We discussed risks and benefits of surgery.  She agrees and we will proceed at her convenience.    Kun Soler MD FACS  Professor of Surgery  Lincoln Cardoza Chair in Surgical [place]  [facility] [employer]  [address] [place] [address], [phone]  Phone [phone]  email: [email or web address]

## 2025-05-27 ENCOUNTER — TELEPHONE (OUTPATIENT)
Dept: HEMATOLOGY/ONCOLOGY | Facility: HOSPITAL | Age: 65
End: 2025-05-27

## 2025-05-27 ENCOUNTER — APPOINTMENT (OUTPATIENT)
Dept: RADIOLOGY | Facility: CLINIC | Age: 65
End: 2025-05-27
Payer: COMMERCIAL

## 2025-05-27 ENCOUNTER — TELEMEDICINE (OUTPATIENT)
Dept: PRIMARY CARE | Facility: CLINIC | Age: 65
End: 2025-05-27
Payer: COMMERCIAL

## 2025-05-27 DIAGNOSIS — R42 VERTIGO: Primary | ICD-10-CM

## 2025-05-27 DIAGNOSIS — Z02.89 ENCOUNTER FOR COMPLETION OF FORM WITH PATIENT: ICD-10-CM

## 2025-05-27 PROCEDURE — 99213 OFFICE O/P EST LOW 20 MIN: CPT | Performed by: STUDENT IN AN ORGANIZED HEALTH CARE EDUCATION/TRAINING PROGRAM

## 2025-05-27 PROCEDURE — 1036F TOBACCO NON-USER: CPT | Performed by: STUDENT IN AN ORGANIZED HEALTH CARE EDUCATION/TRAINING PROGRAM

## 2025-05-27 NOTE — PROGRESS NOTES
Subjective   Patient ID: Sonja Dallas is a 64 y.o. female who presents for Follow-up (Discuss FMLA. ).        HPI    Needs fmla completed   She has not returned to work as expected at the time of the recent visit   Continues to feel dizzy   Has tried meclizine   Ent appt 6/16            Visit Vitals  LMP  (LMP Unknown)   OB Status Postmenopausal   Smoking Status Former      No LMP recorded (lmp unknown). Patient is postmenopausal.   Current Outpatient Medications   Medication Instructions    buPROPion XL (WELLBUTRIN XL) 300 mg, oral, Daily    butalbital-acetaminophen-caff (Fioricet) -40 mg capsule 1 capsule, oral, Every 6 hours PRN    cholecalciferol (Vitamin D3) 10 MCG (400 UNIT) tablet 1 tablet, Daily    ferrous sulfate, 325 mg ferrous sulfate, tablet 1 tablet, Daily    FLAXSEED OIL ORAL Take with water in when needed    folic acid (Folvite) 1 mg tablet 1 tablet, Daily    ketorolac (Acular) 0.5 % ophthalmic solution 1 drop, Left Eye, 4 times daily    meclizine (ANTIVERT) 25 mg, oral, 3 times daily PRN    multivitamin (MULTIPLE VITAMINS ORAL) Daily RT    mv,trey,iron,mn/folic acid/chol (HAIR-SKIN-NAILS, PABA, ORAL) Take by mouth.    naloxone (NARCAN) 4 mg, nasal, As needed, May repeat every 2-3 minutes if needed, alternating nostrils, until medical assistance becomes available.    omeprazole (PRILOSEC) 40 mg, oral, Every morning    oxyCODONE (ROXICODONE) 10 mg, oral, Every 6 hours PRN    potassium chloride CR 20 mEq ER tablet 40 mEq, oral, Daily    prednisoLONE acetate (Pred-Forte) 1 % ophthalmic suspension 1 drop, Left Eye, 4 times daily    SUMAtriptan (IMITREX) 50 mg, oral, Once as needed, May repeat dose once in 2 hours if no relief.  Do not exceed 2 doses in 24 hours.    triamterene-hydrochlorothiazid (Maxzide) 75-50 mg tablet 1 tablet, oral, Daily      Social History     Tobacco Use    Smoking status: Former     Current packs/day: 0.00     Types: Cigarettes     Quit date: 2020     Years since  quittin.4     Passive exposure: Past    Smokeless tobacco: Never   Substance Use Topics    Alcohol use: Not Currently        Review of Systems    Constitutional :As noted in HPI     All other systems have been reviewed and are negative for complaint       Physical Exam    Limited physical due to the virtual nature of this visit ( real time audio- visual )  Constitutional : Alert, in no distress     Pulm : Normal work of breathing   CNS : Moves all extremities symmetrically   Psych:  A , O X 3 normal mood and effect, speaks coherently     Assessment/Plan   Diagnoses and all orders for this visit:  Vertigo  Encounter for completion of form with patient    FMLA completed   Pt advised to go to the ER due to continued dizziness . R/o CVA                 Conditions addressed and mgmt as noted above.  Pertinent labs, images/ imaging reports , chart review was done .   Age appropriate labs / labs for mgmt of chronic medical conditions ordered, further mgmt pending the results.         RTO in  m or sooner if needed . Labs to be done few days prior to the next visit.        This note is intended for the physician writing it, as well as to communicate findings to other healthcare professionals. These notes use medical lexicon that may be misunderstood by non medical persons. Therefore, interpretations of medical notes and terminology should be approached with caution.

## 2025-05-28 ENCOUNTER — SOCIAL WORK (OUTPATIENT)
Dept: HEMATOLOGY/ONCOLOGY | Facility: HOSPITAL | Age: 65
End: 2025-05-28

## 2025-05-28 ENCOUNTER — OFFICE VISIT (OUTPATIENT)
Dept: HEMATOLOGY/ONCOLOGY | Facility: HOSPITAL | Age: 65
End: 2025-05-28
Payer: COMMERCIAL

## 2025-05-28 ENCOUNTER — PHARMACY VISIT (OUTPATIENT)
Dept: PHARMACY | Facility: CLINIC | Age: 65
End: 2025-05-28
Payer: COMMERCIAL

## 2025-05-28 VITALS
HEART RATE: 85 BPM | SYSTOLIC BLOOD PRESSURE: 119 MMHG | RESPIRATION RATE: 18 BRPM | TEMPERATURE: 98.2 F | WEIGHT: 155.1 LBS | DIASTOLIC BLOOD PRESSURE: 64 MMHG | BODY MASS INDEX: 26.62 KG/M2 | OXYGEN SATURATION: 100 %

## 2025-05-28 DIAGNOSIS — H66.001 NON-RECURRENT ACUTE SUPPURATIVE OTITIS MEDIA OF RIGHT EAR WITHOUT SPONTANEOUS RUPTURE OF TYMPANIC MEMBRANE: ICD-10-CM

## 2025-05-28 DIAGNOSIS — G44.229 CHRONIC TENSION-TYPE HEADACHE, NOT INTRACTABLE: ICD-10-CM

## 2025-05-28 DIAGNOSIS — D57.00 SICKLE CELL CRISIS (MULTI): Primary | ICD-10-CM

## 2025-05-28 DIAGNOSIS — E87.6 HYPOKALEMIA: ICD-10-CM

## 2025-05-28 LAB
ALBUMIN SERPL BCP-MCNC: 4.1 G/DL (ref 3.4–5)
ALP SERPL-CCNC: 68 U/L (ref 33–136)
ALT SERPL W P-5'-P-CCNC: 13 U/L (ref 7–45)
ANION GAP SERPL CALC-SCNC: 12 MMOL/L (ref 10–20)
AST SERPL W P-5'-P-CCNC: 17 U/L (ref 9–39)
BASOPHILS # BLD AUTO: 0.02 X10*3/UL (ref 0–0.1)
BASOPHILS NFR BLD AUTO: 0.3 %
BILIRUB SERPL-MCNC: 1.1 MG/DL (ref 0–1.2)
BUN SERPL-MCNC: 8 MG/DL (ref 6–23)
CALCIUM SERPL-MCNC: 9.6 MG/DL (ref 8.6–10.3)
CHLORIDE SERPL-SCNC: 100 MMOL/L (ref 98–107)
CO2 SERPL-SCNC: 29 MMOL/L (ref 21–32)
CREAT SERPL-MCNC: 0.92 MG/DL (ref 0.5–1.05)
EGFRCR SERPLBLD CKD-EPI 2021: 71 ML/MIN/1.73M*2
EOSINOPHIL # BLD AUTO: 0.32 X10*3/UL (ref 0–0.7)
EOSINOPHIL NFR BLD AUTO: 5.6 %
ERYTHROCYTE [DISTWIDTH] IN BLOOD BY AUTOMATED COUNT: 14.5 % (ref 11.5–14.5)
GLUCOSE SERPL-MCNC: 91 MG/DL (ref 74–99)
HCT VFR BLD AUTO: 30.9 % (ref 36–46)
HGB BLD-MCNC: 10.4 G/DL (ref 12–16)
HGB RETIC QN: 29 PG (ref 28–38)
IMM GRANULOCYTES # BLD AUTO: 0.03 X10*3/UL (ref 0–0.7)
IMM GRANULOCYTES NFR BLD AUTO: 0.5 % (ref 0–0.9)
IMMATURE RETIC FRACTION: 25.3 %
LDH SERPL L TO P-CCNC: 146 U/L (ref 84–246)
LYMPHOCYTES # BLD AUTO: 1.21 X10*3/UL (ref 1.2–4.8)
LYMPHOCYTES NFR BLD AUTO: 21 %
MCH RBC QN AUTO: 27.6 PG (ref 26–34)
MCHC RBC AUTO-ENTMCNC: 33.7 G/DL (ref 32–36)
MCV RBC AUTO: 82 FL (ref 80–100)
MONOCYTES # BLD AUTO: 0.49 X10*3/UL (ref 0.1–1)
MONOCYTES NFR BLD AUTO: 8.5 %
NEUTROPHILS # BLD AUTO: 3.68 X10*3/UL (ref 1.2–7.7)
NEUTROPHILS NFR BLD AUTO: 64.1 %
NRBC BLD-RTO: 0 /100 WBCS (ref 0–0)
PLATELET # BLD AUTO: 173 X10*3/UL (ref 150–450)
POTASSIUM SERPL-SCNC: 3 MMOL/L (ref 3.5–5.3)
PROT SERPL-MCNC: 6.7 G/DL (ref 6.4–8.2)
RBC # BLD AUTO: 3.77 X10*6/UL (ref 4–5.2)
RETICS #: 0.14 X10*6/UL (ref 0.02–0.08)
RETICS/RBC NFR AUTO: 3.7 % (ref 0.5–2)
SODIUM SERPL-SCNC: 138 MMOL/L (ref 136–145)
WBC # BLD AUTO: 5.8 X10*3/UL (ref 4.4–11.3)

## 2025-05-28 PROCEDURE — 99214 OFFICE O/P EST MOD 30 MIN: CPT

## 2025-05-28 PROCEDURE — 85025 COMPLETE CBC W/AUTO DIFF WBC: CPT

## 2025-05-28 PROCEDURE — 2500000002 HC RX 250 W HCPCS SELF ADMINISTERED DRUGS (ALT 637 FOR MEDICARE OP, ALT 636 FOR OP/ED)

## 2025-05-28 PROCEDURE — 85045 AUTOMATED RETICULOCYTE COUNT: CPT

## 2025-05-28 PROCEDURE — 2500000004 HC RX 250 GENERAL PHARMACY W/ HCPCS (ALT 636 FOR OP/ED)

## 2025-05-28 PROCEDURE — 3074F SYST BP LT 130 MM HG: CPT

## 2025-05-28 PROCEDURE — 80053 COMPREHEN METABOLIC PANEL: CPT

## 2025-05-28 PROCEDURE — 83615 LACTATE (LD) (LDH) ENZYME: CPT

## 2025-05-28 PROCEDURE — 3078F DIAST BP <80 MM HG: CPT

## 2025-05-28 PROCEDURE — 2500000001 HC RX 250 WO HCPCS SELF ADMINISTERED DRUGS (ALT 637 FOR MEDICARE OP)

## 2025-05-28 PROCEDURE — 96372 THER/PROPH/DIAG INJ SC/IM: CPT

## 2025-05-28 PROCEDURE — RXMED WILLOW AMBULATORY MEDICATION CHARGE

## 2025-05-28 RX ORDER — HYDROMORPHONE HYDROCHLORIDE 1 MG/ML
0.4 INJECTION, SOLUTION INTRAMUSCULAR; INTRAVENOUS; SUBCUTANEOUS EVERY 30 MIN PRN
Status: COMPLETED | OUTPATIENT
Start: 2025-05-28 | End: 2025-05-28

## 2025-05-28 RX ORDER — POTASSIUM CHLORIDE 750 MG/1
40 TABLET, FILM COATED, EXTENDED RELEASE ORAL ONCE
Status: COMPLETED | OUTPATIENT
Start: 2025-05-28 | End: 2025-05-28

## 2025-05-28 RX ORDER — ACETAMINOPHEN 325 MG/1
650 TABLET ORAL ONCE
Status: COMPLETED | OUTPATIENT
Start: 2025-05-28 | End: 2025-05-28

## 2025-05-28 RX ORDER — NALOXONE HYDROCHLORIDE 0.4 MG/ML
0.4 INJECTION, SOLUTION INTRAMUSCULAR; INTRAVENOUS; SUBCUTANEOUS
Status: DISCONTINUED | OUTPATIENT
Start: 2025-05-28 | End: 2025-05-28 | Stop reason: HOSPADM

## 2025-05-28 RX ORDER — ONDANSETRON 8 MG/1
8 TABLET, FILM COATED ORAL ONCE
Status: COMPLETED | OUTPATIENT
Start: 2025-05-28 | End: 2025-05-28

## 2025-05-28 RX ORDER — DIPHENHYDRAMINE HCL 25 MG
25 CAPSULE ORAL ONCE
Status: COMPLETED | OUTPATIENT
Start: 2025-05-28 | End: 2025-05-28

## 2025-05-28 RX ORDER — AMOXICILLIN AND CLAVULANATE POTASSIUM 875; 125 MG/1; MG/1
875 TABLET, FILM COATED ORAL 2 TIMES DAILY
Qty: 14 TABLET | Refills: 0 | Status: SHIPPED | OUTPATIENT
Start: 2025-05-28 | End: 2025-06-04

## 2025-05-28 RX ADMIN — HYDROMORPHONE HYDROCHLORIDE 0.4 MG: 1 INJECTION, SOLUTION INTRAMUSCULAR; INTRAVENOUS; SUBCUTANEOUS at 12:13

## 2025-05-28 RX ADMIN — ACETAMINOPHEN 650 MG: 325 TABLET ORAL at 10:42

## 2025-05-28 RX ADMIN — HYDROMORPHONE HYDROCHLORIDE 0.4 MG: 1 INJECTION, SOLUTION INTRAMUSCULAR; INTRAVENOUS; SUBCUTANEOUS at 11:00

## 2025-05-28 RX ADMIN — DIPHENHYDRAMINE HYDROCHLORIDE 25 MG: 25 CAPSULE ORAL at 09:47

## 2025-05-28 RX ADMIN — ONDANSETRON HYDROCHLORIDE 8 MG: 8 TABLET, FILM COATED ORAL at 09:47

## 2025-05-28 RX ADMIN — HYDROMORPHONE HYDROCHLORIDE 0.4 MG: 1 INJECTION, SOLUTION INTRAMUSCULAR; INTRAVENOUS; SUBCUTANEOUS at 09:50

## 2025-05-28 RX ADMIN — POTASSIUM CHLORIDE 40 MEQ: 750 TABLET, FILM COATED, EXTENDED RELEASE ORAL at 10:42

## 2025-05-28 ASSESSMENT — PAIN - FUNCTIONAL ASSESSMENT
PAIN_FUNCTIONAL_ASSESSMENT: 0-10
PAIN_FUNCTIONAL_ASSESSMENT: 0-10

## 2025-05-28 ASSESSMENT — PAIN SCALES - GENERAL
PAINLEVEL_OUTOF10: 10-WORST PAIN EVER
PAINLEVEL_OUTOF10: 10 - WORST POSSIBLE PAIN
PAINLEVEL_OUTOF10: 7

## 2025-05-28 ASSESSMENT — PAIN DESCRIPTION - LOCATION: LOCATION: LEG

## 2025-05-28 ASSESSMENT — PAIN DESCRIPTION - ORIENTATION
ORIENTATION: LEFT
ORIENTATION: LEFT

## 2025-05-29 ENCOUNTER — TELEPHONE (OUTPATIENT)
Dept: ADMISSION | Facility: HOSPITAL | Age: 65
End: 2025-05-29
Payer: COMMERCIAL

## 2025-05-29 DIAGNOSIS — D57.00 SICKLE CELL ANEMIA WITH PAIN: ICD-10-CM

## 2025-05-29 RX ORDER — OXYCODONE HYDROCHLORIDE 10 MG/1
10 TABLET ORAL EVERY 6 HOURS PRN
Qty: 60 TABLET | Refills: 0 | Status: SHIPPED | OUTPATIENT
Start: 2025-05-29

## 2025-05-29 NOTE — TELEPHONE ENCOUNTER
Refill Request  Oxycodone 10mg every 6 hrs PRN    [facility] [place].    Patient states in message, pain has been more intense take more to avoid crisis

## 2025-05-29 NOTE — PROGRESS NOTES
Transportation Referral     Referral Source: Phone Triage  Multiple Rides Needed? No  First Date Transportation is needed? 5/28/2025  Ambulation: Independently  [place] [address] [place] [place]   Patient Phone: [phone]   Accompaniment: No  Phone Receives Text Messages? Yes  Transportation Service: Roundtrip ( p:[phone]) , Reason: Samed Day visit-[facility]      Scheduled Ride(s):     Date: 5/28/25   Appointment: [facility]   Drop off Address[place] [facility] [place]: [id number] [place] [address]   Time: 8: 30am   Return Ride: will call   Confirmation: email         Pt agreeable to plan. SW will continue to follow as needed.

## 2025-06-02 ENCOUNTER — TELEPHONE (OUTPATIENT)
Dept: PRIMARY CARE | Facility: CLINIC | Age: 65
End: 2025-06-02
Payer: COMMERCIAL

## 2025-06-03 ENCOUNTER — TELEPHONE (OUTPATIENT)
Dept: PRIMARY CARE | Facility: CLINIC | Age: 65
End: 2025-06-03
Payer: COMMERCIAL

## 2025-06-03 ENCOUNTER — TELEPHONE (OUTPATIENT)
Dept: HEMATOLOGY/ONCOLOGY | Facility: HOSPITAL | Age: 65
End: 2025-06-03
Payer: COMMERCIAL

## 2025-06-03 DIAGNOSIS — B37.9 ANTIBIOTIC-INDUCED YEAST INFECTION: Primary | ICD-10-CM

## 2025-06-03 DIAGNOSIS — T36.95XA ANTIBIOTIC-INDUCED YEAST INFECTION: Primary | ICD-10-CM

## 2025-06-03 RX ORDER — FLUCONAZOLE 150 MG/1
150 TABLET ORAL ONCE
Qty: 1 TABLET | Refills: 0 | Status: SHIPPED | OUTPATIENT
Start: 2025-06-03 | End: 2025-06-03

## 2025-06-03 NOTE — TELEPHONE ENCOUNTER
Per Heather Monreal RN pt should be referred to her primary care and if they are unable to see her then she should be seen in an urgent care.    Call to patient and I left a voice mail per her request.  I detailed the need to call primary care and if they will not see her in a timely manner she should be seen in an urgent care.  I also left our call back number.

## 2025-06-03 NOTE — TELEPHONE ENCOUNTER
Patient needs amoxicillin-clavulanate  sent to [facility] in [facility], and would like a medication for a yeast infection

## 2025-06-03 NOTE — TELEPHONE ENCOUNTER
"Sonja calls back to let us know that we originally ordered the antibiotic.  She was in the [facility] clinic.  She completed the 7 day amoxicillin and she is still having her ear hurting - it is \"pounding\"  her ear is still bleeding - her balance is off.  She said her vertigo is \"acting\"  - she doesn't feel like she can drive.  She has been \"woozy\"  when she turns her head.   she is taking antivert that her primary sent in.     The only blood is when she puts a q=tip in.      She is asking for a refill of the amoxicillin and now she also has a yeast infection.  She has had them before and she took diflucan tablets in the past.    We can leave a message on her phone.  She is visiting someone in the hospital.      She does have a ear appt on 6/19.      Pharmacy:  [facility] in [facility].        Please advise    Message sent   "

## 2025-06-03 NOTE — TELEPHONE ENCOUNTER
I called Sonja and no answer so I left V/M on cell to call the nurse line back at [phone], option 6 once she receives the message since I wanted to clarify medication for a yeast infection or if she is having current symptoms.

## 2025-06-12 ENCOUNTER — HOSPITAL ENCOUNTER (OUTPATIENT)
Dept: RADIOLOGY | Facility: CLINIC | Age: 65
Discharge: HOME | End: 2025-06-12
Payer: COMMERCIAL

## 2025-06-12 VITALS — WEIGHT: 150 LBS | BODY MASS INDEX: 25.61 KG/M2 | HEIGHT: 64 IN

## 2025-06-12 DIAGNOSIS — Z12.31 VISIT FOR SCREENING MAMMOGRAM: ICD-10-CM

## 2025-06-12 PROCEDURE — 77067 SCR MAMMO BI INCL CAD: CPT | Performed by: RADIOLOGY

## 2025-06-12 PROCEDURE — 77063 BREAST TOMOSYNTHESIS BI: CPT | Performed by: RADIOLOGY

## 2025-06-12 PROCEDURE — 77067 SCR MAMMO BI INCL CAD: CPT

## 2025-06-18 ENCOUNTER — HOSPITAL ENCOUNTER (OUTPATIENT)
Dept: RADIOLOGY | Facility: CLINIC | Age: 65
Discharge: HOME | End: 2025-06-18
Payer: COMMERCIAL

## 2025-06-18 ENCOUNTER — HOSPITAL ENCOUNTER (OUTPATIENT)
Dept: RADIOLOGY | Facility: CLINIC | Age: 65
End: 2025-06-18
Payer: COMMERCIAL

## 2025-06-18 ENCOUNTER — TELEPHONE (OUTPATIENT)
Dept: ADMISSION | Facility: HOSPITAL | Age: 65
End: 2025-06-18
Payer: COMMERCIAL

## 2025-06-18 DIAGNOSIS — D57.00 SICKLE CELL ANEMIA WITH PAIN: ICD-10-CM

## 2025-06-18 DIAGNOSIS — Z85.118 H/O MALIGNANT NEOPLASM OF LUNG: ICD-10-CM

## 2025-06-18 PROCEDURE — 71250 CT THORAX DX C-: CPT

## 2025-06-18 RX ORDER — OXYCODONE HYDROCHLORIDE 10 MG/1
10 TABLET ORAL EVERY 6 HOURS PRN
Qty: 60 TABLET | Refills: 0 | Status: SHIPPED | OUTPATIENT
Start: 2025-06-18

## 2025-06-18 NOTE — TELEPHONE ENCOUNTER
"Patient calling to ask for guidance from Dr. Pascal on what else she should do or see anyone in regards to her ear and dizziness.   She reports \"feels like she is constantly on a boat.\"  Patient was tearful on phone expressed frustration, as it is not improving.She has been seeing PCP completed antibiotic for ear infection and now has a hearing test appt scheduled for tomorrow, she is concerned about her equilibrium being off she has missed a month of work because of it.   She will proceed with appt tomorrow, was asking Dr. Pascal's advice or recommendations  "

## 2025-06-18 NOTE — TELEPHONE ENCOUNTER
Pt requesting refill  Oxycodone 10mg. 1 tablet every 6 hrs prn  [facility] [address] [facility].   Pt states that during her crisis she did take more meds than directed, she was also seen in [facility] during that crisis on 5/28  Last FUV 1/27, next FUV 7/16

## 2025-06-19 ENCOUNTER — CLINICAL SUPPORT (OUTPATIENT)
Dept: AUDIOLOGY | Facility: CLINIC | Age: 65
End: 2025-06-19
Payer: COMMERCIAL

## 2025-06-19 DIAGNOSIS — H90.3 SENSORINEURAL HEARING LOSS, BILATERAL: Primary | ICD-10-CM

## 2025-06-19 DIAGNOSIS — H92.02 LEFT EAR PAIN: ICD-10-CM

## 2025-06-19 DIAGNOSIS — R42 VERTIGO: ICD-10-CM

## 2025-06-19 PROCEDURE — 92550 TYMPANOMETRY & REFLEX THRESH: CPT | Mod: 52

## 2025-06-19 PROCEDURE — 92557 COMPREHENSIVE HEARING TEST: CPT

## 2025-06-19 NOTE — PROGRESS NOTES
ADULT AUDIOLOGY EVALUATION      Name:  Sonja Dallas   :  1960  Age:  64 y.o.  Date of Evaluation:  2025    Time: 10:45-11:00    IMPRESSIONS     Right ear: essentially normal middle ear functioning, essentially normal hearing sensitivity sloping to mild sensorineural hearing loss, and excellent (92%) word understanding at 60 dB HL.   Left ear: essentially normal middle ear functioning, essentially normal hearing sensitivity sloping to mild sensorineural hearing loss, and excellent (96%) word understanding at 60 dB HL.     RECOMMENDATIONS     Continue medical follow up with primary care provider and Ears Nose and Throat (ENT) provider as recommended.  ENT appointment scheduled for Sat,  with Dr. aMrk.   Re-test hearing annually to monitor.   Consider amplification if hearing begins affecting communication.   Appropriate communication techniques (face-to-face at a 4-6 foot maximum distance, reduced background noise, speech at normal volume and slower rate, good lighting, etc).   Avoid exposure to loud sounds by moving away from the noise, turning down the volume, or wearing proper hearing protection correctly.    HISTORY     Ms. Dallas, 64 y.o., was seen today for an initial audiologic evaluation prior to an evaluation with Hernando Mark MD. History obtained from patient report and chart review.     Sonja reported that about 2 weeks ago she started experiencing left-sided ear pain and was reportedly diagnosed with an ear infection. She also had a sickle cell crisis around the same time. She was prescribed antibiotics for 1 week, but the pain did not completely subside. She presented to an urgent care where she was then prescribed ear drops. She noted that she has finished both courses of treatment but continues to experience intermittent, left-sided otalgia. She denied any recent drainage.     Sonja endorses occasional, bilateral tinnitus that she describes as a buzz. She also reported  dizziness and balance issues; she feels as if she is on a boat all day, every day for about a month (since Mother's day). She noted that turning her head quickly or standing up too quickly makes her dizziness and imbalance worse. She has not identified anything that has alleviated her symptoms. She noted that she often holds onto walls and railings to ambulate.     Sonja denied hearing concerns, aural fullness, otorrhea, history of otologic surgeries or history of loud noise exposure.     AUDIOGRAM         TEST RESULTS     Otoscopic Evaluation:  Right Ear: Ear canal clear, tympanic membrane visualized.  Left Ear: Ear canal clear, tympanic membrane visualized.    Tympanometry (226 Hz): This test is an objective evaluation of middle ear function. CPT code: 51738   Right Ear: Type Ad, middle ear pressure within normal limits and enhanced tympanic membrane compliance.   Left Ear: Type Ad, middle ear pressure within normal limits and enhanced tympanic membrane compliance.     Acoustic Reflexes: This test is an objective measure of auditory and facial nerve pathways.   (Probe) Right Ear (ipsi right stimulus ear; contralateral left stimulus ear): (Ipsilateral) Responses present at expected levels for 500-4000 Hz.  (Probe) Left Ear (ipsi left stimulus ear; contralateral right stimulus ear): (Ipsilateral) Responses present at expected levels for 500-4000 Hz.    Distortion Product Otoacoustic Emissions (DPOAE): This test is a measurement of responses which are generated by the cochlea when it is simultaneously stimulated by two pure tone frequencies. CPT code: 30354   Right Ear: Did not test.  Left Ear: Did not test.    Test technique: Conventional Audiometry via headphones. This test is an evaluation hearing sensitivity via air and bone conduction and speech recognition testing. CPT code: 31934  Reliability: good    Pure Tone Audiometry (125-8000 Hz):     Right Ear: Essentially normal hearing sensitivity sloping to mild  sensorineural hearing loss.   Left Ear: Essentially normal hearing sensitivity sloping to mild sensorineural hearing loss.     Speech Audiometry:   Right Ear: Speech Reception Threshold (SRT) was obtained at 20 dB HL. This is in good agreement with three frequency Pure Tone Average.   Word Recognition scores were excellent (92%) in quiet when words were presented at 60 dB HL. These results are based on [facility]itory Test No.6 (NU-6) Ordered by difficulty (N=10).  Left Ear: Speech Reception Threshold (SRT) was obtained at 15 dB HL. This is in good agreement with three frequency Pure Tone Average.   Word Recognition scores were excellent (96%) in quiet when words were presented at 60 dB HL. These results are based on [facility]itory Test No.6 (NU-6) Ordered by difficulty (N=10).     Comparison of today's results with previous test results: No previous results available.    Completed by:         Marcos Goel, CCC-A, [facility]  Senior Clinical Audiologist -  Audiology Services       Degree of   Hearing Sensitivity dB Range   Within Normal Limits (WNL) 0 - 20   Slight 25   Mild 26 - 40   Moderate 41 - 55   Moderately-Severe 56 - 70   Severe 71 - 90   Profound 91 +     Key   CHL Conductive Hearing Loss   ECV Ear Canal Volume   HA Hearing Aid   NIHL Noise-Induced Hearing Loss   PTA Pure Tone Average   SNHL Sensorineural Hearing Loss   TM Tympanic Membrane   WNL Within Normal Limits

## 2025-06-20 ENCOUNTER — TELEPHONE (OUTPATIENT)
Dept: RADIOLOGY | Facility: HOSPITAL | Age: 65
End: 2025-06-20
Payer: COMMERCIAL

## 2025-06-20 DIAGNOSIS — R91.1 LUNG NODULE SEEN ON IMAGING STUDY: ICD-10-CM

## 2025-06-20 DIAGNOSIS — Z85.118 H/O MALIGNANT NEOPLASM OF LUNG: Primary | ICD-10-CM

## 2025-06-20 NOTE — TELEPHONE ENCOUNTER
6/20/25 1315  Patient has hx of lung ca in 2012 with last surveillance imaging in 2017. CT chest showed a 6 mm nodule, new since previous imaging. Patient is referred to pulmonary for follow up. Sent [employer] message for patient to get scheduled. MARGOT Wilson

## 2025-06-21 ENCOUNTER — OFFICE VISIT (OUTPATIENT)
Dept: OTOLARYNGOLOGY | Facility: CLINIC | Age: 65
End: 2025-06-21
Payer: COMMERCIAL

## 2025-06-21 VITALS — BODY MASS INDEX: 25.61 KG/M2 | HEIGHT: 64 IN | WEIGHT: 150 LBS

## 2025-06-21 DIAGNOSIS — H81.12 BENIGN POSITIONAL VERTIGO, LEFT: ICD-10-CM

## 2025-06-21 DIAGNOSIS — R42 DIZZINESS: Primary | ICD-10-CM

## 2025-06-21 PROCEDURE — 3008F BODY MASS INDEX DOCD: CPT | Performed by: OTOLARYNGOLOGY

## 2025-06-21 PROCEDURE — 99204 OFFICE O/P NEW MOD 45 MIN: CPT | Performed by: OTOLARYNGOLOGY

## 2025-06-21 PROCEDURE — 1036F TOBACCO NON-USER: CPT | Performed by: OTOLARYNGOLOGY

## 2025-06-21 PROCEDURE — 95992 CANALITH REPOSITIONING PROC: CPT | Performed by: OTOLARYNGOLOGY

## 2025-06-21 ASSESSMENT — ENCOUNTER SYMPTOMS
DIZZINESS: 1
HEADACHES: 1

## 2025-06-21 NOTE — PROGRESS NOTES
Subjective   Patient ID: Sonja Dallas is a 64 y.o. female  HPI  Patient is complaining of a 1 month history of dizziness.  She describes her dizziness as a combination of lightheadedness and imbalance.  She does complain of exacerbation of the dizziness when turning her head.  She has no otalgia and no otorrhea.  She does complain of chronic intermittent bilateral nonpulsatile tinnitus.  She did have an ear infection treated with antibiotics around the onset of the dizziness.    Review of Systems   HENT:  Positive for ear pain and tinnitus.    Eyes:         Blurred vision   Cardiovascular:  Positive for leg swelling.   Neurological:  Positive for dizziness and headaches.       Objective   Physical Exam  The following elements of a detailed head and neck exam were performed: General appearance, the skin of the head and neck, inspection of the external ears and ear canal the tympanic membranes, inspection of the mobility of the tympanic membranes, the appearance of the external nose, the nasal mucosa and septum and nasal turbinates, the lips, the teeth, the gums, the tongue, the oral mucosa and the palate, inspection of the tonsils and the posterior pharyngeal wall, indirect mirror laryngoscopy and inspection of the hypopharynx, palpation of the lymph nodes in the neck, and palpation of the thyroid, palpation of the salivary glands, cranial nerve exam including cranial nerves II, III, IV, V, VI, and VII, and cranial nerves IX, X, XI, and XII, and the subjective evaluation of the voice, the inspection of the neck for the presence of respiratory retractions, and the presence or absence of stridor.    The ear canals and the tympanic membranes are clear and mobile.  There is no spontaneous nystagmus noted.  The Robards-Hallpike maneuver was performed and was noted to be positive on the left side with latency and fatigue.  The Epley maneuver was subsequently performed and she was noted to have an immediate improvement.   She continues to complain of sensation of dizziness with head turning though and she also continues to complain of some lightheadedness when moved from sitting to standing position.    Canalith Repositioning for BPPV    Date/Time: 6/21/2025 11:11 AM    Performed by: Hernando Mark MD  Authorized by: Hernando Mark MD    Consent:     Consent obtained:  Verbal  Indications:     Indications:  Benign positional vertigo      Assessment/Plan   Diagnoses and all orders for this visit:  Dizziness (Primary)  -     CT angio head and neck w and wo IV contrast; Future  Benign positional vertigo, left  -     Canalith Repositioning for BPPV     Dizziness and imbalance which appears to be due to multiple factors including benign positional vertigo on the left side with concern for vertebral insufficiency and possible component of orthostasis.  The patient was scheduled for a stat CT angiogram of the head and neck.  She was also advised to consult with her primary care physician regarding further workup for orthostasis.  She was restricted from driving until the dizziness has resolved.  She will follow-up in 2 to 3 weeks.

## 2025-07-01 ENCOUNTER — TELEPHONE (OUTPATIENT)
Dept: OTOLARYNGOLOGY | Facility: CLINIC | Age: 65
End: 2025-07-01
Payer: COMMERCIAL

## 2025-07-03 ENCOUNTER — HOSPITAL ENCOUNTER (OUTPATIENT)
Dept: RADIOLOGY | Facility: CLINIC | Age: 65
Discharge: HOME | End: 2025-07-03
Payer: COMMERCIAL

## 2025-07-03 DIAGNOSIS — R42 DIZZINESS: ICD-10-CM

## 2025-07-03 PROCEDURE — 2550000001 HC RX 255 CONTRASTS: Performed by: OTOLARYNGOLOGY

## 2025-07-03 PROCEDURE — 70498 CT ANGIOGRAPHY NECK: CPT | Performed by: STUDENT IN AN ORGANIZED HEALTH CARE EDUCATION/TRAINING PROGRAM

## 2025-07-03 PROCEDURE — 70498 CT ANGIOGRAPHY NECK: CPT

## 2025-07-03 PROCEDURE — 70496 CT ANGIOGRAPHY HEAD: CPT | Performed by: STUDENT IN AN ORGANIZED HEALTH CARE EDUCATION/TRAINING PROGRAM

## 2025-07-03 RX ADMIN — IOHEXOL 75 ML: 350 INJECTION, SOLUTION INTRAVENOUS at 14:29

## 2025-07-14 ENCOUNTER — TELEPHONE (OUTPATIENT)
Dept: ADMISSION | Facility: HOSPITAL | Age: 65
End: 2025-07-14
Payer: COMMERCIAL

## 2025-07-14 DIAGNOSIS — D57.00 SICKLE CELL ANEMIA WITH PAIN: ICD-10-CM

## 2025-07-14 RX ORDER — OXYCODONE HYDROCHLORIDE 10 MG/1
10 TABLET ORAL EVERY 6 HOURS PRN
Qty: 60 TABLET | Refills: 0 | Status: SHIPPED | OUTPATIENT
Start: 2025-07-14

## 2025-07-15 ENCOUNTER — TELEPHONE (OUTPATIENT)
Dept: HEMATOLOGY/ONCOLOGY | Facility: HOSPITAL | Age: 65
End: 2025-07-15

## 2025-07-16 ENCOUNTER — APPOINTMENT (OUTPATIENT)
Dept: HEMATOLOGY/ONCOLOGY | Facility: HOSPITAL | Age: 65
End: 2025-07-16
Payer: COMMERCIAL

## 2025-07-21 ENCOUNTER — APPOINTMENT (OUTPATIENT)
Dept: HEMATOLOGY/ONCOLOGY | Facility: HOSPITAL | Age: 65
End: 2025-07-21
Payer: COMMERCIAL

## 2025-07-30 ENCOUNTER — APPOINTMENT (OUTPATIENT)
Dept: HEMATOLOGY/ONCOLOGY | Facility: HOSPITAL | Age: 65
End: 2025-07-30
Payer: COMMERCIAL

## 2025-07-30 ENCOUNTER — TELEPHONE (OUTPATIENT)
Dept: HEMATOLOGY/ONCOLOGY | Facility: HOSPITAL | Age: 65
End: 2025-07-30
Payer: COMMERCIAL

## 2025-07-30 DIAGNOSIS — E87.6 HYPOKALEMIA: ICD-10-CM

## 2025-07-30 NOTE — TELEPHONE ENCOUNTER
Sonja calls today to state that she is supposed to have a 1pm appt with Dr. Pascal today but her mother is not doing well. She asks if it can be changed to a virtual visit. If not, she will need to reschedule.    Message sent to Sickle Cell team.

## 2025-07-31 ENCOUNTER — TELEPHONE (OUTPATIENT)
Dept: HEMATOLOGY/ONCOLOGY | Facility: HOSPITAL | Age: 65
End: 2025-07-31
Payer: COMMERCIAL

## 2025-07-31 DIAGNOSIS — E87.6 HYPOKALEMIA: ICD-10-CM

## 2025-07-31 RX ORDER — POTASSIUM CHLORIDE 20 MEQ/1
40 TABLET, EXTENDED RELEASE ORAL DAILY
Qty: 180 TABLET | Refills: 0 | Status: SHIPPED | OUTPATIENT
Start: 2025-07-31

## 2025-07-31 NOTE — TELEPHONE ENCOUNTER
Patient requesting refill of potassium. States requested from PCP as well but has not heard back. States does not need a callback if rx is sent, only if it will not be prescribed.

## 2025-08-02 RX ORDER — POTASSIUM CHLORIDE 20 MEQ/1
TABLET, EXTENDED RELEASE ORAL
Qty: 180 TABLET | Refills: 3 | OUTPATIENT
Start: 2025-08-02

## 2025-08-05 ENCOUNTER — APPOINTMENT (OUTPATIENT)
Dept: OPHTHALMOLOGY | Facility: CLINIC | Age: 65
End: 2025-08-05
Payer: COMMERCIAL

## 2025-08-06 ENCOUNTER — OFFICE VISIT (OUTPATIENT)
Dept: HEMATOLOGY/ONCOLOGY | Facility: HOSPITAL | Age: 65
End: 2025-08-06
Payer: COMMERCIAL

## 2025-08-06 VITALS
BODY MASS INDEX: 25.71 KG/M2 | SYSTOLIC BLOOD PRESSURE: 127 MMHG | RESPIRATION RATE: 14 BRPM | DIASTOLIC BLOOD PRESSURE: 80 MMHG | TEMPERATURE: 97.3 F | OXYGEN SATURATION: 100 % | HEART RATE: 83 BPM | WEIGHT: 149.9 LBS

## 2025-08-06 DIAGNOSIS — D57.00 SICKLE CELL ANEMIA WITH PAIN: ICD-10-CM

## 2025-08-06 PROCEDURE — 99215 OFFICE O/P EST HI 40 MIN: CPT | Performed by: PEDIATRICS

## 2025-08-06 PROCEDURE — 3074F SYST BP LT 130 MM HG: CPT | Performed by: PEDIATRICS

## 2025-08-06 PROCEDURE — 3079F DIAST BP 80-89 MM HG: CPT | Performed by: PEDIATRICS

## 2025-08-06 RX ORDER — OXYCODONE HYDROCHLORIDE 10 MG/1
10 TABLET ORAL EVERY 6 HOURS PRN
Qty: 60 TABLET | Refills: 0 | Status: SHIPPED | OUTPATIENT
Start: 2025-08-13

## 2025-08-06 ASSESSMENT — PAIN SCALES - GENERAL: PAINLEVEL_OUTOF10: 5

## 2025-08-12 ENCOUNTER — APPOINTMENT (OUTPATIENT)
Dept: OPHTHALMOLOGY | Facility: CLINIC | Age: 65
End: 2025-08-12
Payer: COMMERCIAL

## 2025-08-16 ASSESSMENT — ENCOUNTER SYMPTOMS
LEG SWELLING: 0
COUGH: 0
MYALGIAS: 1
CONSTIPATION: 0
HEMOPTYSIS: 0
BACK PAIN: 0
SEIZURES: 0
CHEST TIGHTNESS: 0
BRUISES/BLEEDS EASILY: 0
NUMBNESS: 0
DEPRESSION: 0
FATIGUE: 0
FLANK PAIN: 0
SCLERAL ICTERUS: 0
HEADACHES: 0
WHEEZING: 0
SHORTNESS OF BREATH: 0
FEVER: 0
ARTHRALGIAS: 0
ABDOMINAL PAIN: 0
LIGHT-HEADEDNESS: 0
SORE THROAT: 0
EXTREMITY WEAKNESS: 0
DIZZINESS: 0
PALPITATIONS: 0
NERVOUS/ANXIOUS: 0
DIARRHEA: 0

## 2025-08-23 ENCOUNTER — HOSPITAL ENCOUNTER (EMERGENCY)
Facility: HOSPITAL | Age: 65
Discharge: HOME | End: 2025-08-23
Payer: COMMERCIAL

## 2025-08-23 ENCOUNTER — APPOINTMENT (OUTPATIENT)
Dept: RADIOLOGY | Facility: HOSPITAL | Age: 65
End: 2025-08-23
Payer: COMMERCIAL

## 2025-08-23 VITALS
RESPIRATION RATE: 16 BRPM | WEIGHT: 149 LBS | OXYGEN SATURATION: 100 % | TEMPERATURE: 97.3 F | HEIGHT: 64 IN | DIASTOLIC BLOOD PRESSURE: 79 MMHG | BODY MASS INDEX: 25.44 KG/M2 | HEART RATE: 70 BPM | SYSTOLIC BLOOD PRESSURE: 149 MMHG

## 2025-08-23 DIAGNOSIS — M79.662 PAIN OF LEFT CALF: Primary | ICD-10-CM

## 2025-08-23 PROCEDURE — 2500000001 HC RX 250 WO HCPCS SELF ADMINISTERED DRUGS (ALT 637 FOR MEDICARE OP)

## 2025-08-23 PROCEDURE — 93971 EXTREMITY STUDY: CPT

## 2025-08-23 PROCEDURE — 93971 EXTREMITY STUDY: CPT | Performed by: RADIOLOGY

## 2025-08-23 PROCEDURE — 99284 EMERGENCY DEPT VISIT MOD MDM: CPT | Mod: 25

## 2025-08-23 RX ORDER — OXYCODONE AND ACETAMINOPHEN 5; 325 MG/1; MG/1
2 TABLET ORAL ONCE
Refills: 0 | Status: COMPLETED | OUTPATIENT
Start: 2025-08-23 | End: 2025-08-23

## 2025-08-23 RX ADMIN — OXYCODONE HYDROCHLORIDE AND ACETAMINOPHEN 2 TABLET: 5; 325 TABLET ORAL at 08:20

## 2025-08-23 ASSESSMENT — PAIN DESCRIPTION - DESCRIPTORS
DESCRIPTORS: SQUEEZING
DESCRIPTORS: THROBBING

## 2025-08-23 ASSESSMENT — PAIN - FUNCTIONAL ASSESSMENT
PAIN_FUNCTIONAL_ASSESSMENT: 0-10
PAIN_FUNCTIONAL_ASSESSMENT: 0-10

## 2025-08-23 ASSESSMENT — PAIN DESCRIPTION - ONSET: ONSET: PROGRESSIVE

## 2025-08-23 ASSESSMENT — PAIN DESCRIPTION - PROGRESSION: CLINICAL_PROGRESSION: GRADUALLY WORSENING

## 2025-08-23 ASSESSMENT — PAIN DESCRIPTION - ORIENTATION
ORIENTATION: LEFT
ORIENTATION: LEFT

## 2025-08-23 ASSESSMENT — PAIN DESCRIPTION - PAIN TYPE: TYPE: ACUTE PAIN

## 2025-08-23 ASSESSMENT — PAIN SCALES - GENERAL: PAINLEVEL_OUTOF10: 9

## 2025-08-23 ASSESSMENT — PAIN DESCRIPTION - LOCATION
LOCATION: LEG
LOCATION: LEG

## 2025-08-23 ASSESSMENT — PAIN DESCRIPTION - FREQUENCY: FREQUENCY: CONSTANT/CONTINUOUS

## 2025-08-26 ENCOUNTER — TELEPHONE (OUTPATIENT)
Dept: ADMISSION | Facility: HOSPITAL | Age: 65
End: 2025-08-26
Payer: COMMERCIAL

## 2025-08-26 DIAGNOSIS — D57.00 SICKLE CELL ANEMIA WITH PAIN: ICD-10-CM

## 2025-08-26 RX ORDER — OXYCODONE HYDROCHLORIDE 10 MG/1
10 TABLET ORAL EVERY 6 HOURS PRN
Qty: 60 TABLET | Refills: 0 | Status: SHIPPED | OUTPATIENT
Start: 2025-08-29

## 2025-08-27 ENCOUNTER — OFFICE VISIT (OUTPATIENT)
Dept: NEUROLOGY | Facility: CLINIC | Age: 65
End: 2025-08-27
Payer: COMMERCIAL

## 2025-08-27 VITALS
SYSTOLIC BLOOD PRESSURE: 124 MMHG | HEART RATE: 82 BPM | RESPIRATION RATE: 16 BRPM | HEIGHT: 64 IN | TEMPERATURE: 97 F | DIASTOLIC BLOOD PRESSURE: 60 MMHG | BODY MASS INDEX: 25.44 KG/M2 | WEIGHT: 149 LBS

## 2025-08-27 DIAGNOSIS — G43.809 OTHER MIGRAINE WITHOUT STATUS MIGRAINOSUS, NOT INTRACTABLE: Primary | ICD-10-CM

## 2025-08-27 PROBLEM — G47.34 SLEEP RELATED HYPOXIA: Status: ACTIVE | Noted: 2025-08-27

## 2025-08-27 PROBLEM — C34.92 MALIGNANT NEOPLASM OF LEFT LUNG (MULTI): Status: ACTIVE | Noted: 2025-08-27

## 2025-08-27 PROBLEM — R91.8 MULTIPLE PULMONARY NODULES: Status: ACTIVE | Noted: 2025-08-27

## 2025-08-27 PROCEDURE — 3074F SYST BP LT 130 MM HG: CPT | Performed by: STUDENT IN AN ORGANIZED HEALTH CARE EDUCATION/TRAINING PROGRAM

## 2025-08-27 PROCEDURE — 1036F TOBACCO NON-USER: CPT | Performed by: STUDENT IN AN ORGANIZED HEALTH CARE EDUCATION/TRAINING PROGRAM

## 2025-08-27 PROCEDURE — 3078F DIAST BP <80 MM HG: CPT | Performed by: STUDENT IN AN ORGANIZED HEALTH CARE EDUCATION/TRAINING PROGRAM

## 2025-08-27 PROCEDURE — 99212 OFFICE O/P EST SF 10 MIN: CPT

## 2025-08-27 PROCEDURE — 3008F BODY MASS INDEX DOCD: CPT | Performed by: STUDENT IN AN ORGANIZED HEALTH CARE EDUCATION/TRAINING PROGRAM

## 2025-08-27 PROCEDURE — 99214 OFFICE O/P EST MOD 30 MIN: CPT | Performed by: STUDENT IN AN ORGANIZED HEALTH CARE EDUCATION/TRAINING PROGRAM

## 2025-08-27 RX ORDER — CYCLOBENZAPRINE HCL 5 MG
TABLET ORAL
COMMUNITY
Start: 2025-01-14

## 2025-08-27 RX ORDER — AMOXICILLIN 500 MG/1
CAPSULE ORAL
COMMUNITY
Start: 2025-07-08

## 2025-08-27 RX ORDER — CIPROFLOXACIN AND DEXAMETHASONE 3; 1 MG/ML; MG/ML
SUSPENSION/ DROPS AURICULAR (OTIC)
COMMUNITY
Start: 2025-06-03

## 2025-08-27 RX ORDER — TRETINOIN 0.25 MG/G
CREAM TOPICAL
COMMUNITY
Start: 2024-12-13

## 2025-08-27 RX ORDER — DOXYCYCLINE 100 MG/1
CAPSULE ORAL
COMMUNITY
Start: 2025-04-27

## 2025-08-27 RX ORDER — FLUCONAZOLE 150 MG/1
1 TABLET ORAL
COMMUNITY
Start: 2025-06-03

## 2025-08-27 RX ORDER — DICLOFENAC SODIUM 75 MG/1
TABLET, DELAYED RELEASE ORAL
COMMUNITY
Start: 2025-01-14

## 2025-08-27 RX ORDER — IBUPROFEN 800 MG/1
TABLET, FILM COATED ORAL
COMMUNITY
Start: 2025-06-03

## 2025-08-27 ASSESSMENT — ENCOUNTER SYMPTOMS
DEPRESSION: 0
OCCASIONAL FEELINGS OF UNSTEADINESS: 0
LOSS OF SENSATION IN FEET: 0

## 2025-08-27 ASSESSMENT — PAIN SCALES - GENERAL: PAINLEVEL_OUTOF10: 0-NO PAIN

## 2025-08-28 ENCOUNTER — OFFICE VISIT (OUTPATIENT)
Dept: PULMONOLOGY | Facility: CLINIC | Age: 65
End: 2025-08-28
Payer: COMMERCIAL

## 2025-08-28 VITALS
OXYGEN SATURATION: 97 % | HEART RATE: 103 BPM | WEIGHT: 149 LBS | DIASTOLIC BLOOD PRESSURE: 79 MMHG | HEIGHT: 64 IN | BODY MASS INDEX: 25.44 KG/M2 | SYSTOLIC BLOOD PRESSURE: 115 MMHG

## 2025-08-28 DIAGNOSIS — K44.9 HIATAL HERNIA: ICD-10-CM

## 2025-08-28 DIAGNOSIS — R91.8 MULTIPLE PULMONARY NODULES: Primary | ICD-10-CM

## 2025-08-28 DIAGNOSIS — D57.40: ICD-10-CM

## 2025-08-28 DIAGNOSIS — C34.92 MALIGNANT NEOPLASM OF LEFT LUNG, UNSPECIFIED PART OF LUNG (MULTI): ICD-10-CM

## 2025-08-28 DIAGNOSIS — G47.34 SLEEP RELATED HYPOXIA: ICD-10-CM

## 2025-08-28 PROCEDURE — 3008F BODY MASS INDEX DOCD: CPT | Performed by: HOSPITALIST

## 2025-08-28 PROCEDURE — 1036F TOBACCO NON-USER: CPT | Performed by: HOSPITALIST

## 2025-08-28 PROCEDURE — 3078F DIAST BP <80 MM HG: CPT | Performed by: HOSPITALIST

## 2025-08-28 PROCEDURE — 3074F SYST BP LT 130 MM HG: CPT | Performed by: HOSPITALIST

## 2025-08-28 PROCEDURE — 99212 OFFICE O/P EST SF 10 MIN: CPT | Performed by: HOSPITALIST

## 2025-08-28 PROCEDURE — 99205 OFFICE O/P NEW HI 60 MIN: CPT | Performed by: HOSPITALIST

## 2025-08-30 ENCOUNTER — APPOINTMENT (OUTPATIENT)
Dept: OTOLARYNGOLOGY | Facility: CLINIC | Age: 65
End: 2025-08-30
Payer: COMMERCIAL

## 2025-09-02 DIAGNOSIS — E87.6 HYPOKALEMIA: ICD-10-CM

## 2025-09-03 ENCOUNTER — TELEPHONE (OUTPATIENT)
Dept: ADMISSION | Facility: HOSPITAL | Age: 65
End: 2025-09-03
Payer: COMMERCIAL

## 2025-09-05 RX ORDER — POTASSIUM CHLORIDE 20 MEQ/1
40 TABLET, EXTENDED RELEASE ORAL
Qty: 60 TABLET | Refills: 1 | Status: SHIPPED | OUTPATIENT
Start: 2025-09-05

## 2025-09-08 ENCOUNTER — APPOINTMENT (OUTPATIENT)
Dept: HEMATOLOGY/ONCOLOGY | Facility: HOSPITAL | Age: 65
End: 2025-09-08
Payer: COMMERCIAL

## 2025-10-04 ENCOUNTER — APPOINTMENT (OUTPATIENT)
Dept: OTOLARYNGOLOGY | Facility: CLINIC | Age: 65
End: 2025-10-04
Payer: COMMERCIAL

## 2025-10-30 ENCOUNTER — APPOINTMENT (OUTPATIENT)
Dept: PRIMARY CARE | Facility: CLINIC | Age: 65
End: 2025-10-30
Payer: COMMERCIAL

## 2025-11-12 ENCOUNTER — APPOINTMENT (OUTPATIENT)
Dept: PRIMARY CARE | Facility: CLINIC | Age: 65
End: 2025-11-12
Payer: COMMERCIAL

## 2025-11-19 ENCOUNTER — APPOINTMENT (OUTPATIENT)
Dept: PRIMARY CARE | Facility: CLINIC | Age: 65
End: 2025-11-19
Payer: COMMERCIAL

## (undated) DEVICE — DRESSING, TRANSPARENT, TEGADERM, 2-3/8 X 2-3/4 IN